# Patient Record
Sex: MALE | Race: WHITE | NOT HISPANIC OR LATINO | Employment: OTHER | URBAN - METROPOLITAN AREA
[De-identification: names, ages, dates, MRNs, and addresses within clinical notes are randomized per-mention and may not be internally consistent; named-entity substitution may affect disease eponyms.]

---

## 2017-01-27 ENCOUNTER — LAB VISIT (OUTPATIENT)
Dept: LAB | Facility: OTHER | Age: 62
End: 2017-01-27
Attending: INTERNAL MEDICINE
Payer: COMMERCIAL

## 2017-01-27 ENCOUNTER — OFFICE VISIT (OUTPATIENT)
Dept: INTERNAL MEDICINE | Facility: CLINIC | Age: 62
End: 2017-01-27
Payer: COMMERCIAL

## 2017-01-27 VITALS
SYSTOLIC BLOOD PRESSURE: 114 MMHG | HEART RATE: 50 BPM | DIASTOLIC BLOOD PRESSURE: 70 MMHG | HEIGHT: 70 IN | OXYGEN SATURATION: 97 % | BODY MASS INDEX: 22.47 KG/M2 | WEIGHT: 156.94 LBS

## 2017-01-27 DIAGNOSIS — Z00.00 ANNUAL PHYSICAL EXAM: Primary | ICD-10-CM

## 2017-01-27 DIAGNOSIS — Z00.00 ANNUAL PHYSICAL EXAM: ICD-10-CM

## 2017-01-27 LAB
ALBUMIN SERPL BCP-MCNC: 4 G/DL
ALP SERPL-CCNC: 51 U/L
ALT SERPL W/O P-5'-P-CCNC: 30 U/L
ANION GAP SERPL CALC-SCNC: 5 MMOL/L
AST SERPL-CCNC: 40 U/L
BILIRUB SERPL-MCNC: 0.5 MG/DL
BUN SERPL-MCNC: 22 MG/DL
CALCIUM SERPL-MCNC: 9.8 MG/DL
CHLORIDE SERPL-SCNC: 97 MMOL/L
CHOLEST/HDLC SERPL: 2.3 {RATIO}
CO2 SERPL-SCNC: 31 MMOL/L
CREAT SERPL-MCNC: 1 MG/DL
EST. GFR  (AFRICAN AMERICAN): >60 ML/MIN/1.73 M^2
EST. GFR  (NON AFRICAN AMERICAN): >60 ML/MIN/1.73 M^2
GLUCOSE SERPL-MCNC: 89 MG/DL
HDL/CHOLESTEROL RATIO: 44.1 %
HDLC SERPL-MCNC: 104 MG/DL
HDLC SERPL-MCNC: 236 MG/DL
LDLC SERPL CALC-MCNC: 118 MG/DL
NONHDLC SERPL-MCNC: 132 MG/DL
POTASSIUM SERPL-SCNC: 4.2 MMOL/L
PROT SERPL-MCNC: 6.9 G/DL
SODIUM SERPL-SCNC: 133 MMOL/L
TRIGL SERPL-MCNC: 70 MG/DL

## 2017-01-27 PROCEDURE — 80053 COMPREHEN METABOLIC PANEL: CPT

## 2017-01-27 PROCEDURE — 80061 LIPID PANEL: CPT

## 2017-01-27 PROCEDURE — 99999 PR PBB SHADOW E&M-EST. PATIENT-LVL III: CPT | Mod: PBBFAC,,, | Performed by: INTERNAL MEDICINE

## 2017-01-27 PROCEDURE — 86803 HEPATITIS C AB TEST: CPT

## 2017-01-27 PROCEDURE — 36415 COLL VENOUS BLD VENIPUNCTURE: CPT

## 2017-01-27 PROCEDURE — 99396 PREV VISIT EST AGE 40-64: CPT | Mod: S$GLB,,, | Performed by: INTERNAL MEDICINE

## 2017-01-27 NOTE — PROGRESS NOTES
"Subjective:       Patient ID: Hugh Bates is a 61 y.o. male.    Chief Complaint: Annual Exam    HPI Comments:   Pt here for physical exam.    He is feeling well.  No c/o.      ROS  -Occasional triggering in a finger of his R hand - he is unsure which finger.  This has been happening for approx 1 yr and occurs infrequently.    -Self dx'd plantar fasciitis and he has bene using a 'roller' for this.      He runs a few miles 3x/wk.  He also lifts weights 3x/wk.         Review of Systems   Constitutional: Negative.    HENT: Negative.    Eyes: Negative.    Respiratory: Negative.    Cardiovascular: Negative.    Gastrointestinal: Negative.    Genitourinary: Negative.    Musculoskeletal: Negative.    Skin: Negative.    Neurological: Negative.    Psychiatric/Behavioral: Negative.        Objective:       Vitals:    01/27/17 1103   BP: 114/70   Pulse: (!) 50   SpO2: 97%   Weight: 71.2 kg (156 lb 15.5 oz)   Height: 5' 10" (1.778 m)     Physical Exam   Constitutional: He is oriented to person, place, and time. He appears well-developed and well-nourished. No distress.   HENT:   Head: Normocephalic and atraumatic.   Right Ear: Tympanic membrane, external ear and ear canal normal.   Left Ear: Tympanic membrane, external ear and ear canal normal.   Mouth/Throat: Oropharynx is clear and moist and mucous membranes are normal. No oropharyngeal exudate or posterior oropharyngeal erythema.   Eyes: Conjunctivae and EOM are normal. Pupils are equal, round, and reactive to light.   Neck: Normal range of motion. Neck supple. No thyromegaly present.   Cardiovascular: Normal rate, regular rhythm and normal heart sounds.  Exam reveals no gallop and no friction rub.    No murmur heard.  Pulmonary/Chest: Effort normal and breath sounds normal. No respiratory distress. He has no wheezes. He has no rhonchi. He has no rales.   Lymphadenopathy:     He has no cervical adenopathy.   Neurological: He is alert and oriented to person, place, and " time.   Skin: He is not diaphoretic.       Assessment:       1. Annual physical exam        Plan:           HM - Update labs.

## 2017-01-27 NOTE — MR AVS SNAPSHOT
"    [facility]  [address]  [place] [address]  Phone: [phone]  Fax: [phone]                  Hugh Ramirezyayo   2017 11:00 AM   Office Visit    Description:  Male : 1955   Provider:  Rolando Obrien MD   Department:  [facility]           Reason for Visit     Annual Exam           Diagnoses this Visit        Comments    Annual physical exam    -  Primary            To Do List           Goals (5 Years of Data)     None      Follow-Up and Disposition     Return in about 2 years (around 2019).      [facility]     [facility] -  Assistance  Registered nurses in the [facility] provide clinical advisement, health education, appointment booking, and other advisory services.  Call for this free service at [phone].             Medications           Message regarding Medications     Verify the changes and/or additions to your medication regime listed below are the same as discussed with your clinician today.  If any of these changes or additions are incorrect, please notify your healthcare provider.             Verify that the below list of medications is an accurate representation of the medications you are currently taking.  If none reported, the list may be blank. If incorrect, please contact your healthcare provider. Carry this list with you in case of emergency.           Current Medications     omeprazole (PRILOSEC) 20 MG capsule Take 20 mg by mouth as needed.           Clinical Reference Information           Vital Signs - Last Recorded  Most recent update: 2017 11:06 AM by Tamara Foster MA    BP Pulse Ht Wt SpO2 BMI    114/70 (!) 50 5' 10" (1.778 m) 71.2 kg (156 lb 15.5 oz) 97% 22.52 kg/m2      Blood Pressure          Most Recent Value    BP  114/70      Allergies as of 2017     No Known Allergies      Immunizations Administered on Date of Encounter - 2017     None    "   Orders Placed During Today's Visit     Future Labs/Procedures Expected by Expires    Comprehensive metabolic panel  1/27/2017 3/28/2018    Hepatitis C antibody  1/27/2017 3/28/2018    Lipid panel  1/27/2017 3/28/2018

## 2017-01-30 ENCOUNTER — PATIENT MESSAGE (OUTPATIENT)
Dept: INTERNAL MEDICINE | Facility: CLINIC | Age: 62
End: 2017-01-30

## 2017-01-30 DIAGNOSIS — R76.8 POSITIVE HEPATITIS C ANTIBODY TEST: ICD-10-CM

## 2017-01-30 DIAGNOSIS — E87.1 HYPONATREMIA: Primary | ICD-10-CM

## 2017-01-30 LAB — HCV AB SERPL QL IA: POSITIVE

## 2017-01-30 NOTE — TELEPHONE ENCOUNTER
Results reviewed.  Patient notified of results via [employer] message.      Please arrange for pt to have additional testing.

## 2017-02-02 ENCOUNTER — TELEPHONE (OUTPATIENT)
Dept: INTERNAL MEDICINE | Facility: CLINIC | Age: 62
End: 2017-02-02

## 2017-02-02 ENCOUNTER — LAB VISIT (OUTPATIENT)
Dept: LAB | Facility: OTHER | Age: 62
End: 2017-02-02
Attending: INTERNAL MEDICINE
Payer: COMMERCIAL

## 2017-02-02 DIAGNOSIS — E87.1 HYPONATREMIA: ICD-10-CM

## 2017-02-02 DIAGNOSIS — Z11.59 NEED FOR HEPATITIS C SCREENING TEST: ICD-10-CM

## 2017-02-02 DIAGNOSIS — R76.8 POSITIVE HEPATITIS C ANTIBODY TEST: ICD-10-CM

## 2017-02-02 LAB
ANION GAP SERPL CALC-SCNC: 9 MMOL/L
BUN SERPL-MCNC: 21 MG/DL
CALCIUM SERPL-MCNC: 9.3 MG/DL
CHLORIDE SERPL-SCNC: 99 MMOL/L
CO2 SERPL-SCNC: 25 MMOL/L
CREAT SERPL-MCNC: 1 MG/DL
EST. GFR  (AFRICAN AMERICAN): >60 ML/MIN/1.73 M^2
EST. GFR  (NON AFRICAN AMERICAN): >60 ML/MIN/1.73 M^2
GLUCOSE SERPL-MCNC: 92 MG/DL
OSMOLALITY SERPL: 281 MOSM/KG
POTASSIUM SERPL-SCNC: 4.4 MMOL/L
SODIUM SERPL-SCNC: 133 MMOL/L
TSH SERPL DL<=0.005 MIU/L-ACNC: 1.79 UIU/ML

## 2017-02-02 PROCEDURE — 83930 ASSAY OF BLOOD OSMOLALITY: CPT

## 2017-02-02 PROCEDURE — 80048 BASIC METABOLIC PNL TOTAL CA: CPT

## 2017-02-02 PROCEDURE — 87522 HEPATITIS C REVRS TRNSCRPJ: CPT

## 2017-02-02 PROCEDURE — 86803 HEPATITIS C AB TEST: CPT

## 2017-02-02 PROCEDURE — 84443 ASSAY THYROID STIM HORMONE: CPT

## 2017-02-02 NOTE — TELEPHONE ENCOUNTER
Pt inform of lab results and further testing. Pt agrees and verbally understand and has no further questions at this time. Pt states he will call back to schedule additional testing.

## 2017-02-03 LAB — HCV AB SERPL QL IA: POSITIVE

## 2017-02-06 LAB
HCV LOG: <1.08 LOG (10) IU/ML
HCV RNA QUANT PCR: <12 IU/ML
HCV, QUALITATIVE: NOT DETECTED IU/ML

## 2017-02-08 ENCOUNTER — PATIENT MESSAGE (OUTPATIENT)
Dept: INTERNAL MEDICINE | Facility: CLINIC | Age: 62
End: 2017-02-08

## 2017-02-13 ENCOUNTER — TELEPHONE (OUTPATIENT)
Dept: INTERNAL MEDICINE | Facility: CLINIC | Age: 62
End: 2017-02-13

## 2017-02-13 DIAGNOSIS — E87.1 HYPONATREMIA: Primary | ICD-10-CM

## 2017-02-13 DIAGNOSIS — R76.8 HEPATITIS C ANTIBODY TEST POSITIVE: ICD-10-CM

## 2017-02-16 ENCOUNTER — OFFICE VISIT (OUTPATIENT)
Dept: NEPHROLOGY | Facility: CLINIC | Age: 62
End: 2017-02-16
Payer: COMMERCIAL

## 2017-02-16 ENCOUNTER — HOSPITAL ENCOUNTER (OUTPATIENT)
Dept: RADIOLOGY | Facility: HOSPITAL | Age: 62
Discharge: HOME | End: 2017-02-16
Attending: INTERNAL MEDICINE
Payer: COMMERCIAL

## 2017-02-16 ENCOUNTER — LAB VISIT (OUTPATIENT)
Dept: LAB | Facility: OTHER | Age: 62
End: 2017-02-16
Attending: INTERNAL MEDICINE
Payer: COMMERCIAL

## 2017-02-16 VITALS
BODY MASS INDEX: 22.22 KG/M2 | HEIGHT: 70 IN | HEART RATE: 55 BPM | DIASTOLIC BLOOD PRESSURE: 70 MMHG | WEIGHT: 155.19 LBS | OXYGEN SATURATION: 97 % | SYSTOLIC BLOOD PRESSURE: 100 MMHG

## 2017-02-16 DIAGNOSIS — E87.1 HYPONATREMIA: ICD-10-CM

## 2017-02-16 DIAGNOSIS — E87.1 HYPONATREMIA: Primary | ICD-10-CM

## 2017-02-16 LAB
BASOPHILS NFR BLD: 0.5 %
DIFFERENTIAL METHOD: ABNORMAL
EOSINOPHIL NFR BLD: 1 %
ERYTHROCYTE [DISTWIDTH] IN BLOOD BY AUTOMATED COUNT: 12.9 %
HCT VFR BLD AUTO: 41.8 %
HGB BLD-MCNC: 13.9 G/DL
LYMPHOCYTES NFR BLD: 28.1 %
MCH RBC QN AUTO: 31 PG
MCHC RBC AUTO-ENTMCNC: 33.3 %
MCV RBC AUTO: 93 FL
MONOCYTES NFR BLD: 8.6 %
NEUTROPHILS NFR BLD: 61.8 %
PHOSPHATE SERPL-MCNC: 3.1 MG/DL
PLATELET # BLD AUTO: 208 K/UL
PMV BLD AUTO: 10.3 FL
PTH-INTACT SERPL-MCNC: 19.9 PG/ML
RBC # BLD AUTO: 4.49 M/UL
URATE SERPL-MCNC: 5.5 MG/DL
WBC # BLD AUTO: 3.84 K/UL

## 2017-02-16 PROCEDURE — 84550 ASSAY OF BLOOD/URIC ACID: CPT

## 2017-02-16 PROCEDURE — 71020 XR CHEST PA AND LATERAL: CPT | Mod: 26,,, | Performed by: RADIOLOGY

## 2017-02-16 PROCEDURE — 84100 ASSAY OF PHOSPHORUS: CPT

## 2017-02-16 PROCEDURE — 83970 ASSAY OF PARATHORMONE: CPT

## 2017-02-16 PROCEDURE — 83036 HEMOGLOBIN GLYCOSYLATED A1C: CPT

## 2017-02-16 PROCEDURE — 99999 PR PBB SHADOW E&M-EST. PATIENT-LVL III: CPT | Mod: PBBFAC,,, | Performed by: INTERNAL MEDICINE

## 2017-02-16 PROCEDURE — 36415 COLL VENOUS BLD VENIPUNCTURE: CPT

## 2017-02-16 PROCEDURE — 85025 COMPLETE CBC W/AUTO DIFF WBC: CPT

## 2017-02-16 PROCEDURE — 99244 OFF/OP CNSLTJ NEW/EST MOD 40: CPT | Mod: S$GLB,,, | Performed by: INTERNAL MEDICINE

## 2017-02-16 PROCEDURE — 71020 XR CHEST PA AND LATERAL: CPT | Mod: TC

## 2017-02-16 NOTE — LETTER
February 16, 2017      Rolando Obrien MD  [address]  [facility] [address]           [facility]  [address]  [facility] [address]  Phone: [phone]  Fax: [phone]          Patient: Hugh Baets   MR Number: [id number]   YOB: 1955   Date of Visit: 2/16/2017       Dear Dr. Rolando Obrien:    Thank you for referring Hugh Bates to me for evaluation. Attached you will find relevant portions of my assessment and plan of care.    If you have questions, please do not hesitate to call me. I look forward to following Hugh Bates along with you.    Sincerely,    Josue Hamilton MD    Enclosure  CC:  No Recipients    If you would like to receive this communication electronically, please contact [email or web address] or [phone] to request more information on [email or web address] access.    For providers and/or their staff who would like to refer a patient to [facility], please contact us through our one-stop-shop provider referral line, [facility], at [phone].    If you feel you have received this communication in error or would no longer like to receive these types of communications, please e-mail [email or web address]

## 2017-02-16 NOTE — PROGRESS NOTES
New Consultation Report  Nephrology      Consult Requested By: PCP  Reason for Consult: hyponatremia    History of Present Illness:  Patient is a 61 y.o. male presents with unremarkable PMH for a new consultation for evaluation of new onset hyponatremia. The electrolyte disorder was identified on a routine laboratory testing. The patient states that he is very active. He exercises in a gymnasium 3-4 times a week, including lifting weights. During exercise, he drinks a good amount of water. In addition, he drinks 3-4 glasses of water and coffee in the morning. The patient denies taking NSAIDs or new antibiotics, recreational drugs, recent episode of dehydration, diarrhea, nausea or vomiting, acute illness, hospitalization or exposure to IV radiocontrast or new medication. He does not take an anticonvulsant or an SSRI. Denies LOC, seizure or gait unsteadiness. Denies headaches, cough or SOB.     Past Medical History  GERD  HCV infection    MEDICATIONS  None  OTC: vitamins    Review of patient's allergies indicates:  No Known Allergies     Past Surgical History   Procedure Laterality Date    Colonoscopy N/A 2016     Procedure: COLONOSCOPY;  Surgeon: George Virgen MD;  Location: [address];  Service: Colon and Rectal;  Laterality: N/A;     Family History   Problem Relation Age of Onset    Colon polyps Mother     Cancer Father       at 43   unknown primary    No Known Problems Brother     Diabetes Neg Hx      Social History   Substance Use Topics    Smoking status: Never Smoker    Smokeless tobacco: Not on file    Alcohol use 0.6 oz/week     1 Glasses of wine per week       Review of Systems   Constitutional: Negative.    HENT: Negative.    Eyes: Negative.    Respiratory: Negative.    Cardiovascular: Negative.    Gastrointestinal: Positive for heartburn.   Genitourinary: Negative.    Musculoskeletal: Negative.    Skin: Negative.    Neurological: Negative.    All other systems reviewed and are  negative.      Vitals:    02/16/17 1346   BP: 100/70   Pulse: (!) 55       PHYSICAL EXAMINATION:  General: no distress, well nourished  Skin: color, texture, turgor normal. No rash or lesions  HEENT: H Eyes: reactive pupils, normal conjunctiva. Oral mucosa moist, no ulcers. Throat: no erythema.  Neck: supple, symmetrical, trachea midline, no JVD, no carotid bruit  Lungs: clear to auscultation bilaterally and normal respiratory effort  Cardiovascular: Heart: regular rate and rhythm, S1, S2 normal, no murmur, rub or gallop. Pulses: 2+ and symmetric.  Abdomen: bowel sounds present, no abdominal bruit, soft, non-tender non-distented; no masses, organomegaly or ascites.   Musculoskeletal: no pitting edema in lower extremities, no clubbing or cyanosis  Lymph Nodes: No cervical or supraclavicular adenopathy  Neurologic: AAOx3, normal strength and tone. No focal deficit. No asterixis.       LABORATORY DATA:  Lab Results   Component Value Date    CREATININE 1.0 02/02/2017       Prot/Creat Ratio, Ur   Date Value Ref Range Status   02/16/2017 Unable to calculate 0.00 - 0.20 Final       Lab Results   Component Value Date     (L) 02/02/2017    K 4.4 02/02/2017    CO2 25 02/02/2017       last PTH   Lab Results   Component Value Date    PTH 19.9 02/16/2017    CALCIUM 9.3 02/02/2017    PHOS 3.1 02/16/2017       Lab Results   Component Value Date    HGB 13.9 (L) 02/16/2017        Lab Results   Component Value Date    HGBA1C 5.9 09/07/2012       Lab Results   Component Value Date    LDLCALC 118.0 01/27/2017     Urine Na 23 mEq/L  Urine Osm 251 mOsm/kg    IMPRESSION / RECOMMENDATIONS:     1. Hyponatremia  Unknown etiology. Chronic, mild. Appears euvolemic. Urine parameters suggest incomplete or mild SIADH versus reset osmostat. No debilitating condition to predispose him to reset osmostat. Comprehensive metabolic panel. No CNS or pulmonary diagnosis to suggest SIADH. Will obtain serum Osm to confirm that is a hypoosmolal  hyponatremia. Will also investigate for pseudohyponatremia. For treatment, I asked him to limit water intake to 1 L per day. Will observe after that is implemented. Tests to order:    Lipid panel    OSMOLALITY    X-Ray Chest PA And Lateral    Sodium, urine, random    Osmolality, urine    Uric acid    TSH    Cortisol    Basic metabolic panel     SUMMARY OF PLAN:  1. Limit water intake to 1 L per day or less  2. Obtain: serum Osm, BMP, urine Osm, Shadia, serum uric acid, cortisol, TSH, CXR, lipid panel, total protein.   3. RTC in 8 weeks

## 2017-02-16 NOTE — MR AVS SNAPSHOT
"    [facility]  [address]  [place] [address]  Phone: [phone]  Fax: [phone]                  Hugh Bates   2017 1:20 PM   Office Visit    Description:  Male : 1955   Provider:  Josue Hamilton MD   Department:  [facility]           Diagnoses this Visit        Comments    Hyponatremia    -  Primary            To Do List           Goals (5 Years of Data)     None      Follow-Up and Disposition     Return in about 8 weeks (around 2017).      [facility]     [facility] Nurse Care Line -  Assistance  Registered nurses in the [facility] provide clinical advisement, health education, appointment booking, and other advisory services.  Call for this free service at [phone].             Medications           Message regarding Medications     Verify the changes and/or additions to your medication regime listed below are the same as discussed with your clinician today.  If any of these changes or additions are incorrect, please notify your healthcare provider.        STOP taking these medications     omeprazole (PRILOSEC) 20 MG capsule Take 20 mg by mouth as needed.           Verify that the below list of medications is an accurate representation of the medications you are currently taking.  If none reported, the list may be blank. If incorrect, please contact your healthcare provider. Carry this list with you in case of emergency.           Current Medications            Clinical Reference Information           Your Vitals Were     BP Pulse Height Weight SpO2 BMI    100/70 55 5' 10" (1.778 m) 70.4 kg (155 lb 3.3 oz) 97% 22.27 kg/m2      Blood Pressure          Most Recent Value    BP  100/70      Allergies as of 2017     No Known Allergies      Immunizations Administered on Date of Encounter - 2017     None      Orders Placed During Today's Visit     Future Labs/Procedures Expected by Expires    Comprehensive " metabolic panel  2/16/2017 4/17/2018    Cortisol  2/16/2017 4/17/2018    Lipid panel  2/16/2017 4/17/2018    Osmolality, urine  2/16/2017 4/17/2018    OSMOLALITY  2/16/2017 4/17/2018    Sodium, urine, random  2/16/2017 4/17/2018    TSH  2/16/2017 4/17/2018    Uric acid  2/16/2017 4/17/2018    X-Ray Chest PA And Lateral  2/16/2017 2/16/2018    Basic metabolic panel  4/16/2017 (Approximate) 4/17/2018      Language Assistance Services     ATTENTION: Language assistance services are available, free of charge. Please call [phone].      ATENCIÓN: Si habla español, tiene a ya disposición servicios gratuitos de asistencia lingüística. Llame al [phone].     CHÚ Ý: N?u b?n nói Ti?ng Vi?t, có các d?ch v? h? tr? ngôn ng? mi?n phí dành cho b?n. G?i s? [phone].         [facility] complies with applicable Federal civil rights laws and does not discriminate on the basis of race, color, national origin, age, disability, or sex.

## 2017-02-17 LAB
ESTIMATED AVG GLUCOSE: 114 MG/DL
HBA1C MFR BLD HPLC: 5.6 %

## 2017-02-21 DIAGNOSIS — E87.1 HYPONATREMIA: Primary | ICD-10-CM

## 2017-04-10 ENCOUNTER — LAB VISIT (OUTPATIENT)
Dept: LAB | Facility: OTHER | Age: 62
End: 2017-04-10
Attending: INTERNAL MEDICINE
Payer: COMMERCIAL

## 2017-04-10 DIAGNOSIS — E87.1 HYPONATREMIA: ICD-10-CM

## 2017-04-10 LAB
BILIRUB UR QL STRIP: NEGATIVE
CLARITY UR: CLEAR
COLOR UR: YELLOW
CREAT UR-MCNC: 62.5 MG/DL
GLUCOSE UR QL STRIP: NEGATIVE
HGB UR QL STRIP: NEGATIVE
KETONES UR QL STRIP: NEGATIVE
LEUKOCYTE ESTERASE UR QL STRIP: NEGATIVE
NITRITE UR QL STRIP: NEGATIVE
PH UR STRIP: 7 [PH] (ref 5–8)
PROT UR QL STRIP: NEGATIVE
PROT UR-MCNC: <7 MG/DL
PROT/CREAT RATIO, UR: NORMAL
SP GR UR STRIP: 1.01 (ref 1–1.03)
URN SPEC COLLECT METH UR: NORMAL
UROBILINOGEN UR STRIP-ACNC: NEGATIVE EU/DL

## 2017-04-10 PROCEDURE — 81003 URINALYSIS AUTO W/O SCOPE: CPT

## 2017-04-10 PROCEDURE — 82570 ASSAY OF URINE CREATININE: CPT

## 2017-04-21 ENCOUNTER — OFFICE VISIT (OUTPATIENT)
Dept: NEPHROLOGY | Facility: CLINIC | Age: 62
End: 2017-04-21
Payer: COMMERCIAL

## 2017-04-21 VITALS
OXYGEN SATURATION: 98 % | HEIGHT: 70 IN | BODY MASS INDEX: 21.47 KG/M2 | DIASTOLIC BLOOD PRESSURE: 78 MMHG | HEART RATE: 48 BPM | SYSTOLIC BLOOD PRESSURE: 118 MMHG | WEIGHT: 149.94 LBS

## 2017-04-21 DIAGNOSIS — E87.1 HYPONATREMIA: Primary | ICD-10-CM

## 2017-04-21 PROCEDURE — 1160F RVW MEDS BY RX/DR IN RCRD: CPT | Mod: S$GLB,,, | Performed by: INTERNAL MEDICINE

## 2017-04-21 PROCEDURE — 99213 OFFICE O/P EST LOW 20 MIN: CPT | Mod: S$GLB,,, | Performed by: INTERNAL MEDICINE

## 2017-04-21 PROCEDURE — 99999 PR PBB SHADOW E&M-EST. PATIENT-LVL III: CPT | Mod: PBBFAC,,, | Performed by: INTERNAL MEDICINE

## 2017-04-21 NOTE — PROGRESS NOTES
Return Visit Report  Nephrology      Consult Requested By: PCP  Reason for Consult: hyponatremia    History of Present Illness:  Patient is a 62 y.o. male  returns for follow up evaluation of new onset hyponatremia.     The patient states that he remains very active. He exercises in a gymnasium 3-4 times a week, including lifting weights. He has significantly reduced the amount of water that he drinks daily.  The patient denies taking NSAIDs or new antibiotics, recreational drugs, recent episode of dehydration, diarrhea, nausea or vomiting, acute illness, hospitalization or exposure to IV radiocontrast or new medication. He does not take an anticonvulsant or an SSRI. Denies LOC, seizure or gait unsteadiness. Denies headaches, cough or SOB.     Past Medical History  GERD  HCV infection    MEDICATIONS  None  OTC: vitamins    Review of Systems   Constitutional: Negative.    HENT: Negative.    Eyes: Negative.    Respiratory: Negative.    Cardiovascular: Negative.    Gastrointestinal: Positive for heartburn.   Genitourinary: Negative.    Musculoskeletal: Negative.    Skin: Negative.    Neurological: Negative.    All other systems reviewed and are negative.      BP: 118/62 mmHg    PHYSICAL EXAMINATION:  General: no distress, well nourished  Skin: color, texture, turgor normal. No rash or lesions  HEENT: Eyes: reactive pupils, normal conjunctiva. Oral mucosa moist, no ulcers. Throat: no erythema.  Neck: supple, symmetrical, trachea midline, no JVD, no carotid bruit  Lungs: clear to auscultation bilaterally and normal respiratory effort  Cardiovascular: Heart: regular rate and rhythm, S1, S2 normal, no murmur, rub or gallop. Pulses: 2+ and symmetric.  Abdomen: bowel sounds present, no abdominal bruit, soft, non-tender non-distented; no masses, organomegaly or ascites.   Musculoskeletal: no pitting edema in lower extremities, no clubbing or cyanosis  Lymph Nodes: No cervical or supraclavicular adenopathy  Neurologic: AAOx3,  normal strength and tone. No focal deficit. No asterixis.       LABORATORY DATA:  Lab Results   Component Value Date    CREATININE 1.0 04/10/2017       Prot/Creat Ratio, Ur   Date Value Ref Range Status   04/10/2017 Unable to calculate 0.00 - 0.20 Final   02/16/2017 Unable to calculate 0.00 - 0.20 Final       Lab Results   Component Value Date     04/10/2017    K 4.3 04/10/2017    CO2 29 04/10/2017       last PTH   Lab Results   Component Value Date    PTH 19.9 02/16/2017    CALCIUM 9.3 04/10/2017    PHOS 3.1 02/16/2017       Lab Results   Component Value Date    HGB 13.9 (L) 04/10/2017        Lab Results   Component Value Date    HGBA1C 5.6 02/16/2017       Lab Results   Component Value Date    LDLCALC 105.4 02/16/2017     Urine Na 23 mEq/L  Urine Osm 251 mOsm/kg    IMPRESSION / RECOMMENDATIONS:     1. Hyponatremia  Most likely due to Incomplete or mild SIADH versus reset osmostat. Has improved remarkably, sNa WNL now. I recommend to continue limiting water intake to 1 - 1.5 L per day.      SUMMARY OF PLAN:  1. Limit water intake to 1 - 1.5 L per day   2. RTC PRN

## 2018-11-30 ENCOUNTER — PATIENT MESSAGE (OUTPATIENT)
Dept: INTERNAL MEDICINE | Facility: CLINIC | Age: 63
End: 2018-11-30

## 2018-11-30 DIAGNOSIS — Z00.00 ANNUAL PHYSICAL EXAM: Primary | ICD-10-CM

## 2018-11-30 NOTE — TELEPHONE ENCOUNTER
Spoke to Homa Jana and scheduled a date and time to have lab work.  Patient states no further questions or concerns.

## 2018-12-07 ENCOUNTER — OFFICE VISIT (OUTPATIENT)
Dept: INTERNAL MEDICINE | Facility: CLINIC | Age: 63
End: 2018-12-07
Payer: COMMERCIAL

## 2018-12-07 ENCOUNTER — LAB VISIT (OUTPATIENT)
Dept: LAB | Facility: OTHER | Age: 63
End: 2018-12-07
Attending: NURSE PRACTITIONER
Payer: COMMERCIAL

## 2018-12-07 VITALS
HEART RATE: 58 BPM | BODY MASS INDEX: 22.25 KG/M2 | OXYGEN SATURATION: 98 % | DIASTOLIC BLOOD PRESSURE: 64 MMHG | WEIGHT: 155.44 LBS | SYSTOLIC BLOOD PRESSURE: 118 MMHG | HEIGHT: 70 IN

## 2018-12-07 DIAGNOSIS — R82.998 AMBER-COLORED URINE: Primary | ICD-10-CM

## 2018-12-07 DIAGNOSIS — R30.0 DYSURIA: ICD-10-CM

## 2018-12-07 DIAGNOSIS — N30.00 ACUTE CYSTITIS WITHOUT HEMATURIA: ICD-10-CM

## 2018-12-07 LAB
BACTERIA #/AREA URNS AUTO: ABNORMAL /HPF
BILIRUB UR QL STRIP: NEGATIVE
CLARITY UR REFRACT.AUTO: CLEAR
COLOR UR AUTO: YELLOW
GLUCOSE UR QL STRIP: NEGATIVE
HGB UR QL STRIP: ABNORMAL
KETONES UR QL STRIP: NEGATIVE
LEUKOCYTE ESTERASE UR QL STRIP: ABNORMAL
MICROSCOPIC COMMENT: ABNORMAL
NITRITE UR QL STRIP: NEGATIVE
PH UR STRIP: 7 [PH] (ref 5–8)
PROT UR QL STRIP: NEGATIVE
RBC #/AREA URNS AUTO: 2 /HPF (ref 0–4)
SP GR UR STRIP: 1 (ref 1–1.03)
SQUAMOUS #/AREA URNS AUTO: 0 /HPF
URN SPEC COLLECT METH UR: ABNORMAL
WBC #/AREA URNS AUTO: 87 /HPF (ref 0–5)
WBC CLUMPS UR QL AUTO: ABNORMAL

## 2018-12-07 PROCEDURE — 3008F BODY MASS INDEX DOCD: CPT | Mod: CPTII,S$GLB,, | Performed by: NURSE PRACTITIONER

## 2018-12-07 PROCEDURE — 99213 OFFICE O/P EST LOW 20 MIN: CPT | Mod: S$GLB,,, | Performed by: NURSE PRACTITIONER

## 2018-12-07 PROCEDURE — 81001 URINALYSIS AUTO W/SCOPE: CPT

## 2018-12-07 PROCEDURE — 87088 URINE BACTERIA CULTURE: CPT

## 2018-12-07 PROCEDURE — 99999 PR PBB SHADOW E&M-EST. PATIENT-LVL IV: CPT | Mod: PBBFAC,,, | Performed by: NURSE PRACTITIONER

## 2018-12-07 PROCEDURE — 87186 SC STD MICRODIL/AGAR DIL: CPT

## 2018-12-07 PROCEDURE — 87077 CULTURE AEROBIC IDENTIFY: CPT

## 2018-12-07 PROCEDURE — 87086 URINE CULTURE/COLONY COUNT: CPT

## 2018-12-07 RX ORDER — NITROFURANTOIN (MACROCRYSTALS) 100 MG/1
100 CAPSULE ORAL EVERY 12 HOURS
Qty: 14 CAPSULE | Refills: 0 | Status: SHIPPED | OUTPATIENT
Start: 2018-12-07 | End: 2018-12-14

## 2018-12-07 RX ORDER — PHENAZOPYRIDINE HYDROCHLORIDE 100 MG/1
100 TABLET, FILM COATED ORAL 3 TIMES DAILY PRN
Qty: 6 TABLET | Refills: 0 | Status: SHIPPED | OUTPATIENT
Start: 2018-12-07 | End: 2018-12-07

## 2018-12-07 NOTE — PATIENT INSTRUCTIONS
Urine Dipstick, U/A, Urine culture.  Macrodantin 100 mg po q 12 hours x 7 days.  OTC Tylenol prn.  Report to Emergency Department for severe or worsening symptoms. Understands ED prompts.  F/U with Urology.  F/U with PCP.  RT prn.

## 2018-12-07 NOTE — PROGRESS NOTES
Subjective:       Patient ID: Hugh Bates is a 63 y.o. male.    Chief Complaint: Hematuria      Hematuria   This is a new problem. The current episode started yesterday. The problem is unchanged. He reports no clotting in his urine stream. His pain is at a severity of 4/10. The pain is moderate. He describes his urine color as tea colored. Irritative symptoms include frequency and urgency. Irritative symptoms do not include nocturia. Associated symptoms include dysuria. Pertinent negatives include no abdominal pain, chills, facial swelling, fever, flank pain, genital pain, hesitancy, inability to urinate, nausea, vomiting or sore throat. (Dizziness  achiness ) There is no history of hypertension,  trauma, kidney stones, recent infection, sickle cell disease, STDs, tobacco use or UTI.   Urinary Tract Infection    This is a new problem. The current episode started yesterday. The problem has been unchanged. The quality of the pain is described as burning. The pain is at a severity of 4/10. The pain is mild. There has been no fever. There is no history of pyelonephritis. Associated symptoms include frequency, hematuria and urgency. Pertinent negatives include no behavior changes, chills, discharge, flank pain, hesitancy, nausea, possible pregnancy, sweats, vomiting, weight loss, bubble bath use, constipation, rash or withholding. He has tried nothing for the symptoms. There is no history of catheterization, diabetes insipidus, diabetes mellitus, genitourinary reflux, hypertension, kidney stones, recurrent UTIs, a single kidney, STD, urinary stasis or a urological procedure.        Past Medical History: Patient has no past medical history on file.    Past Surgical History: Patient has a past surgical history that includes COLONOSCOPY (N/A, 9/30/2016).    Social History: Patient reports that  has never smoked. He does not have any smokeless tobacco history on file. He reports that he drinks about 0.6 oz of alcohol  per week. He reports that he does not use drugs.    Family History: family history includes Cancer in his father; Colon polyps in his mother; No Known Problems in his brother.    Medications:   Current Outpatient Medications   Medication Sig    nitrofurantoin (MACRODANTIN) 100 MG capsule Take 1 capsule (100 mg total) by mouth every 12 (twelve) hours. for 7 days     No current facility-administered medications for this visit.        Allergies: Patient has No Known Allergies.    Review of Systems   Constitutional: Negative for activity change, chills, fatigue, fever, unexpected weight change and weight loss.   HENT: Negative for congestion, dental problem, ear discharge, ear pain, facial swelling, hearing loss, mouth sores, nosebleeds, postnasal drip, rhinorrhea, sinus pressure, sneezing, sore throat, tinnitus, trouble swallowing and voice change.    Eyes: Negative for pain and visual disturbance.   Respiratory: Negative for cough, choking, chest tightness, shortness of breath, wheezing and stridor.    Cardiovascular: Negative for chest pain.   Gastrointestinal: Negative for abdominal pain, constipation, nausea and vomiting.   Genitourinary: Positive for dysuria, frequency, hematuria and urgency. Negative for decreased urine volume, difficulty urinating, discharge, enuresis, flank pain, genital sores, hesitancy, nocturia, penile pain, penile swelling, scrotal swelling and testicular pain.   Musculoskeletal: Negative for gait problem, neck pain and neck stiffness.   Skin: Negative for color change, rash and wound.   Allergic/Immunologic: Negative for environmental allergies.   Neurological: Negative for dizziness, seizures, syncope, facial asymmetry, speech difficulty, weakness, light-headedness, numbness and headaches.   Psychiatric/Behavioral: Negative for agitation, confusion and decreased concentration. The patient is not nervous/anxious.        Objective:       /64 (BP Location: Left arm, Patient Position:  "Sitting, BP Method: Medium (Manual))   Pulse (!) 58   Ht 5' 10" (1.778 m)   Wt 70.5 kg (155 lb 6.8 oz)   SpO2 98%   BMI 22.30 kg/m²     Physical Exam   Constitutional: He is oriented to person, place, and time. He appears well-developed and well-nourished.   HENT:   Head: Normocephalic and atraumatic. Not macrocephalic and not microcephalic. Head is without raccoon's eyes, without Man's sign, without abrasion, without contusion, without laceration, without right periorbital erythema and without left periorbital erythema. Hair is normal.   Right Ear: No lacerations. No drainage, swelling or tenderness. No foreign bodies. No mastoid tenderness. Tympanic membrane is not injected, not scarred, not perforated, not erythematous, not retracted and not bulging. Tympanic membrane mobility is normal. No middle ear effusion. No hemotympanum. No decreased hearing is noted.   Left Ear: No lacerations. No drainage, swelling or tenderness. No foreign bodies. No mastoid tenderness. Tympanic membrane is not injected, not scarred, not perforated, not erythematous, not retracted and not bulging. Tympanic membrane mobility is normal.  No middle ear effusion. No hemotympanum. No decreased hearing is noted.   Nose: Nose normal. No mucosal edema, rhinorrhea, nose lacerations, sinus tenderness or nasal deformity.   Mouth/Throat: Uvula is midline.   Eyes: Conjunctivae and lids are normal. No scleral icterus.   Neck: Trachea normal. Neck supple. No spinous process tenderness and no muscular tenderness present. No neck rigidity. No edema, no erythema and normal range of motion present. No thyroid mass and no thyromegaly present.   Cardiovascular: Normal rate, regular rhythm, normal heart sounds and intact distal pulses. Exam reveals no gallop and no friction rub.   No murmur heard.  Pulmonary/Chest: Effort normal and breath sounds normal. No stridor. No respiratory distress. He has no wheezes. He has no rales. He exhibits no " tenderness.   Abdominal: Soft. Bowel sounds are normal. He exhibits no distension, no ascites and no mass. There is no tenderness. There is no rigidity, no rebound, no guarding, no CVA tenderness, no tenderness at McBurney's point and negative Alcantara's sign.   Musculoskeletal: Normal range of motion.   Lymphadenopathy:        Head (right side): No submental, no submandibular, no tonsillar, no preauricular and no posterior auricular adenopathy present.        Head (left side): No submental, no submandibular, no tonsillar, no preauricular, no posterior auricular and no occipital adenopathy present.   Neurological: He is alert and oriented to person, place, and time. No cranial nerve deficit.   Skin: Skin is warm and dry.   Psychiatric: He has a normal mood and affect. His behavior is normal. Judgment and thought content normal.   Vitals reviewed.      Assessment:       1. Babita-colored urine    2. Acute cystitis without hematuria    3. Dysuria        Plan:       Urine Dipstick, U/A, Urine culture.  Macrodantin 100 mg po q 12 hours x 7 days.  OTC Tylenol prn.  Report to Emergency Department for severe or worsening symptoms. Understands ED prompts.  F/U with Urology.  F/U with PCP.  RT prn.

## 2018-12-10 LAB — BACTERIA UR CULT: NORMAL

## 2018-12-13 ENCOUNTER — LAB VISIT (OUTPATIENT)
Dept: LAB | Facility: OTHER | Age: 63
End: 2018-12-13
Attending: INTERNAL MEDICINE
Payer: COMMERCIAL

## 2018-12-13 DIAGNOSIS — Z00.00 ANNUAL PHYSICAL EXAM: ICD-10-CM

## 2018-12-13 LAB
ALBUMIN SERPL BCP-MCNC: 3.6 G/DL
ALP SERPL-CCNC: 141 U/L
ALT SERPL W/O P-5'-P-CCNC: 48 U/L
ANION GAP SERPL CALC-SCNC: 7 MMOL/L
AST SERPL-CCNC: 29 U/L
BASOPHILS # BLD AUTO: 0.03 K/UL
BASOPHILS NFR BLD: 0.9 %
BILIRUB SERPL-MCNC: 0.5 MG/DL
BUN SERPL-MCNC: 19 MG/DL
CALCIUM SERPL-MCNC: 9.6 MG/DL
CHLORIDE SERPL-SCNC: 98 MMOL/L
CHOLEST SERPL-MCNC: 221 MG/DL
CHOLEST/HDLC SERPL: 2.6 {RATIO}
CO2 SERPL-SCNC: 30 MMOL/L
CREAT SERPL-MCNC: 0.9 MG/DL
DIFFERENTIAL METHOD: ABNORMAL
EOSINOPHIL # BLD AUTO: 0.1 K/UL
EOSINOPHIL NFR BLD: 2.8 %
ERYTHROCYTE [DISTWIDTH] IN BLOOD BY AUTOMATED COUNT: 12.8 %
EST. GFR  (AFRICAN AMERICAN): >60 ML/MIN/1.73 M^2
EST. GFR  (NON AFRICAN AMERICAN): >60 ML/MIN/1.73 M^2
ESTIMATED AVG GLUCOSE: 108 MG/DL
GLUCOSE SERPL-MCNC: 85 MG/DL
HBA1C MFR BLD HPLC: 5.4 %
HCT VFR BLD AUTO: 41.6 %
HDLC SERPL-MCNC: 86 MG/DL
HDLC SERPL: 38.9 %
HGB BLD-MCNC: 13.6 G/DL
LDLC SERPL CALC-MCNC: 128.2 MG/DL
LYMPHOCYTES # BLD AUTO: 1.1 K/UL
LYMPHOCYTES NFR BLD: 32.6 %
MCH RBC QN AUTO: 30.6 PG
MCHC RBC AUTO-ENTMCNC: 32.7 G/DL
MCV RBC AUTO: 94 FL
MONOCYTES # BLD AUTO: 0.4 K/UL
MONOCYTES NFR BLD: 12.7 %
NEUTROPHILS # BLD AUTO: 1.6 K/UL
NEUTROPHILS NFR BLD: 51 %
NONHDLC SERPL-MCNC: 135 MG/DL
PLATELET # BLD AUTO: 264 K/UL
PMV BLD AUTO: 10.4 FL
POTASSIUM SERPL-SCNC: 4.6 MMOL/L
PROT SERPL-MCNC: 6.9 G/DL
RBC # BLD AUTO: 4.45 M/UL
SODIUM SERPL-SCNC: 135 MMOL/L
TRIGL SERPL-MCNC: 34 MG/DL
TSH SERPL DL<=0.005 MIU/L-ACNC: 1.28 UIU/ML
WBC # BLD AUTO: 3.22 K/UL

## 2018-12-13 PROCEDURE — 85025 COMPLETE CBC W/AUTO DIFF WBC: CPT

## 2018-12-13 PROCEDURE — 84443 ASSAY THYROID STIM HORMONE: CPT

## 2018-12-13 PROCEDURE — 80053 COMPREHEN METABOLIC PANEL: CPT

## 2018-12-13 PROCEDURE — 83036 HEMOGLOBIN GLYCOSYLATED A1C: CPT

## 2018-12-13 PROCEDURE — 80061 LIPID PANEL: CPT

## 2018-12-13 PROCEDURE — 36415 COLL VENOUS BLD VENIPUNCTURE: CPT

## 2018-12-18 ENCOUNTER — LAB VISIT (OUTPATIENT)
Dept: LAB | Facility: OTHER | Age: 63
End: 2018-12-18
Attending: INTERNAL MEDICINE
Payer: COMMERCIAL

## 2018-12-18 ENCOUNTER — OFFICE VISIT (OUTPATIENT)
Dept: INTERNAL MEDICINE | Facility: CLINIC | Age: 63
End: 2018-12-18
Attending: INTERNAL MEDICINE
Payer: COMMERCIAL

## 2018-12-18 VITALS
HEART RATE: 50 BPM | HEIGHT: 70 IN | BODY MASS INDEX: 22.03 KG/M2 | SYSTOLIC BLOOD PRESSURE: 108 MMHG | DIASTOLIC BLOOD PRESSURE: 80 MMHG | WEIGHT: 153.88 LBS | OXYGEN SATURATION: 97 %

## 2018-12-18 DIAGNOSIS — R74.8 ELEVATED ALKALINE PHOSPHATASE LEVEL: ICD-10-CM

## 2018-12-18 DIAGNOSIS — N30.00 ACUTE CYSTITIS WITHOUT HEMATURIA: ICD-10-CM

## 2018-12-18 DIAGNOSIS — K21.9 GASTROESOPHAGEAL REFLUX DISEASE WITHOUT ESOPHAGITIS: ICD-10-CM

## 2018-12-18 DIAGNOSIS — Z00.00 ANNUAL PHYSICAL EXAM: Primary | ICD-10-CM

## 2018-12-18 LAB
25(OH)D3+25(OH)D2 SERPL-MCNC: 29 NG/ML
ALBUMIN SERPL BCP-MCNC: 3.8 G/DL
ALP SERPL-CCNC: 95 U/L
ALT SERPL W/O P-5'-P-CCNC: 30 U/L
ANION GAP SERPL CALC-SCNC: 8 MMOL/L
AST SERPL-CCNC: 28 U/L
BILIRUB SERPL-MCNC: 0.3 MG/DL
BUN SERPL-MCNC: 21 MG/DL
CALCIUM SERPL-MCNC: 9.8 MG/DL
CHLORIDE SERPL-SCNC: 99 MMOL/L
CO2 SERPL-SCNC: 28 MMOL/L
COMPLEXED PSA SERPL-MCNC: 6.5 NG/ML
CREAT SERPL-MCNC: 1.1 MG/DL
EST. GFR  (AFRICAN AMERICAN): >60 ML/MIN/1.73 M^2
EST. GFR  (NON AFRICAN AMERICAN): >60 ML/MIN/1.73 M^2
GGT SERPL-CCNC: 140 U/L
GLUCOSE SERPL-MCNC: 83 MG/DL
POTASSIUM SERPL-SCNC: 4.4 MMOL/L
PROT SERPL-MCNC: 7 G/DL
SODIUM SERPL-SCNC: 135 MMOL/L

## 2018-12-18 PROCEDURE — 99396 PR PREVENTIVE VISIT,EST,40-64: ICD-10-PCS | Mod: S$GLB,,, | Performed by: INTERNAL MEDICINE

## 2018-12-18 PROCEDURE — 82306 VITAMIN D 25 HYDROXY: CPT

## 2018-12-18 PROCEDURE — 99999 PR PBB SHADOW E&M-EST. PATIENT-LVL III: CPT | Mod: PBBFAC,,, | Performed by: INTERNAL MEDICINE

## 2018-12-18 PROCEDURE — 86703 HIV-1/HIV-2 1 RESULT ANTBDY: CPT

## 2018-12-18 PROCEDURE — 99396 PREV VISIT EST AGE 40-64: CPT | Mod: S$GLB,,, | Performed by: INTERNAL MEDICINE

## 2018-12-18 PROCEDURE — 80053 COMPREHEN METABOLIC PANEL: CPT

## 2018-12-18 PROCEDURE — 36415 COLL VENOUS BLD VENIPUNCTURE: CPT

## 2018-12-18 PROCEDURE — 87340 HEPATITIS B SURFACE AG IA: CPT

## 2018-12-18 PROCEDURE — 99999 PR PBB SHADOW E&M-EST. PATIENT-LVL III: ICD-10-PCS | Mod: PBBFAC,,, | Performed by: INTERNAL MEDICINE

## 2018-12-18 PROCEDURE — 84153 ASSAY OF PSA TOTAL: CPT

## 2018-12-18 PROCEDURE — 82977 ASSAY OF GGT: CPT

## 2018-12-18 NOTE — PROGRESS NOTES
Subjective:       Patient ID: Hugh Bates is a 63 y.o. male.    Chief Complaint: Annual Exam    Here to establish care    Recent UTI successfully tx with macrodantin. Symptoms improved.    Plantar fasciitis   Left leg pain when driving    Sometimes told his iron is low when he donates blood. He does this quite often every chance he can.    Hx of shingles on right chest wall. No acute issues.    Labs reviewed. Mild increase in alk phos and LFTs. He denies night sweats, weight loss, bones pains, abd pain, yellowing of skin, excessive EtOH intake, daily tylenol intake, recent neew meds aside from Abx as above.               Review of Systems   Constitutional: Negative for activity change and unexpected weight change.   HENT: Positive for rhinorrhea. Negative for hearing loss and trouble swallowing.    Eyes: Negative for discharge and visual disturbance.   Respiratory: Negative for chest tightness and wheezing.    Cardiovascular: Negative for chest pain and palpitations.   Gastrointestinal: Negative for blood in stool, constipation, diarrhea and vomiting.   Endocrine: Negative for polydipsia and polyuria.   Genitourinary: Positive for urgency. Negative for difficulty urinating and hematuria.   Musculoskeletal: Negative for arthralgias, joint swelling and neck pain.   Neurological: Negative for weakness and headaches.   Psychiatric/Behavioral: Negative for confusion and dysphoric mood.       No past medical history on file.  Past Surgical History:   Procedure Laterality Date    COLONOSCOPY N/A 2016    Performed by George Virgen MD at [facility] (4TH FLR)     Family History   Problem Relation Age of Onset    Colon polyps Mother     Cancer Father          at 43   unknown primary    No Known Problems Brother     Diabetes Neg Hx      Social History     Socioeconomic History    Marital status:      Spouse name: Not on file    Number of children: Not on file    Years of education: Not on file     "Highest education level: Not on file   Social Needs    Financial resource strain: Not on file    Food insecurity - worry: Not on file    Food insecurity - inability: Not on file    Transportation needs - medical: Not on file    Transportation needs - non-medical: Not on file   Occupational History    Occupation: Collections   Tobacco Use    Smoking status: Never Smoker   Substance and Sexual Activity    Alcohol use: Yes     Alcohol/week: 0.6 oz     Types: 1 Glasses of wine per week    Drug use: No    Sexual activity: Not on file   Other Topics Concern    Not on file   Social History Narrative    Lives w/wife.  3 adult children.           Objective:      Vitals:    12/18/18 1557   BP: 108/80   BP Location: Right arm   Patient Position: Sitting   BP Method: Small (Manual)   Pulse: (!) 50   SpO2: 97%   Weight: 69.8 kg (153 lb 14.1 oz)   Height: 5' 10" (1.778 m)      Physical Exam   Constitutional: He is oriented to person, place, and time. He appears well-developed and well-nourished. No distress.   HENT:   Head: Normocephalic and atraumatic.   Mouth/Throat: Oropharynx is clear and moist. No oropharyngeal exudate.   Eyes: Conjunctivae and EOM are normal. Pupils are equal, round, and reactive to light. No scleral icterus.   Neck: No thyromegaly present.   Cardiovascular: Normal rate, regular rhythm and normal heart sounds.   No murmur heard.  Pulmonary/Chest: Effort normal and breath sounds normal. He has no wheezes. He has no rales.   Abdominal: Soft. He exhibits no distension. There is no tenderness.   Musculoskeletal: He exhibits no edema or tenderness.   Lymphadenopathy:     He has no cervical adenopathy.   Neurological: He is alert and oriented to person, place, and time.   Skin: Skin is warm and dry.   Psychiatric: He has a normal mood and affect. His behavior is normal.       Assessment:       1. Annual physical exam    2. Gastroesophageal reflux disease without esophagitis    3. Elevated alkaline " phosphatase level    4. Acute cystitis without hematuria        Plan:       Hugh was seen today for annual exam.    Diagnoses and all orders for this visit:    Annual physical exam  Labs and HM and cancer screening discussed.    Gastroesophageal reflux disease without esophagitis    Elevated alkaline phosphatase level  -     Comprehensive metabolic panel; Future  -     HIV 1/2 Ag/Ab (4th Gen); Future  -     Hepatitis B surface antigen; Future  -     Vitamin D; Future  -     Gamma GT; Future  -     US Abdomen Limited; Future  -     PSA, Screening; Future    Acute cystitis without hematuria  resolved    RTC in 2 months or sooner prn      Side effects of medication(s) were discussed in detail and patient voiced understanding.  Patient will call back for any issues or complications.

## 2018-12-19 ENCOUNTER — TELEPHONE (OUTPATIENT)
Dept: INTERNAL MEDICINE | Facility: CLINIC | Age: 63
End: 2018-12-19

## 2018-12-19 DIAGNOSIS — R97.20 ELEVATED PSA: Primary | ICD-10-CM

## 2018-12-19 LAB
HBV SURFACE AG SERPL QL IA: NEGATIVE
HIV 1+2 AB+HIV1 P24 AG SERPL QL IA: NEGATIVE

## 2018-12-19 NOTE — TELEPHONE ENCOUNTER
Spoke to pt about recent labs.  Please re-schedule appt with Dr Hassan, but now for a different reason

## 2019-01-17 ENCOUNTER — OFFICE VISIT (OUTPATIENT)
Dept: UROLOGY | Facility: CLINIC | Age: 64
End: 2019-01-17
Attending: INTERNAL MEDICINE
Payer: COMMERCIAL

## 2019-01-17 VITALS
SYSTOLIC BLOOD PRESSURE: 114 MMHG | WEIGHT: 153 LBS | DIASTOLIC BLOOD PRESSURE: 76 MMHG | BODY MASS INDEX: 21.9 KG/M2 | HEIGHT: 70 IN | HEART RATE: 51 BPM

## 2019-01-17 DIAGNOSIS — R97.20 ELEVATED PSA: Primary | ICD-10-CM

## 2019-01-17 DIAGNOSIS — N39.0 URINARY TRACT INFECTION WITHOUT HEMATURIA, SITE UNSPECIFIED: ICD-10-CM

## 2019-01-17 LAB
BILIRUB SERPL-MCNC: NORMAL MG/DL
BLOOD URINE, POC: NORMAL
COLOR, POC UA: NORMAL
GLUCOSE UR QL STRIP: NORMAL
KETONES UR QL STRIP: NORMAL
LEUKOCYTE ESTERASE URINE, POC: NORMAL
NITRITE, POC UA: NORMAL
PH, POC UA: 7
PROTEIN, POC: NORMAL
SPECIFIC GRAVITY, POC UA: 1
UROBILINOGEN, POC UA: NORMAL

## 2019-01-17 PROCEDURE — 81002 URINALYSIS NONAUTO W/O SCOPE: CPT | Mod: S$GLB,,, | Performed by: UROLOGY

## 2019-01-17 PROCEDURE — 3008F BODY MASS INDEX DOCD: CPT | Mod: CPTII,S$GLB,, | Performed by: UROLOGY

## 2019-01-17 PROCEDURE — 99204 OFFICE O/P NEW MOD 45 MIN: CPT | Mod: 25,S$GLB,, | Performed by: UROLOGY

## 2019-01-17 PROCEDURE — 3008F PR BODY MASS INDEX (BMI) DOCUMENTED: ICD-10-PCS | Mod: CPTII,S$GLB,, | Performed by: UROLOGY

## 2019-01-17 PROCEDURE — 81002 POCT URINE DIPSTICK WITHOUT MICROSCOPE: ICD-10-PCS | Mod: S$GLB,,, | Performed by: UROLOGY

## 2019-01-17 PROCEDURE — 99204 PR OFFICE/OUTPT VISIT, NEW, LEVL IV, 45-59 MIN: ICD-10-PCS | Mod: 25,S$GLB,, | Performed by: UROLOGY

## 2019-01-17 RX ORDER — MULTIVITAMIN
1 TABLET ORAL DAILY
COMMUNITY

## 2019-01-17 RX ORDER — OMEGA-3/DHA/EPA/FISH OIL 300-1000MG
CAPSULE,DELAYED RELEASE (ENTERIC COATED) ORAL
COMMUNITY

## 2019-01-17 RX ORDER — AMOXICILLIN 500 MG
CAPSULE ORAL DAILY
COMMUNITY
End: 2019-01-17

## 2019-01-17 RX ORDER — GLUCOSAMINE/CHONDRO SU A 500-400 MG
1 TABLET ORAL 3 TIMES DAILY
COMMUNITY

## 2019-01-17 NOTE — LETTER
January 17, 2019      Joseph Santana MD  [address]  [address]  [facility] [address]           [facility]  [address] [place] [address], [address]  [facility] [address]  Phone: [phone]  Fax: [phone]          Patient: Hugh Bates   MR Number: [id number]   YOB: 1955   Date of Visit: 1/17/2019       Dear Dr. Joseph Santana:    Thank you for referring Hugh Bates to me for evaluation. Attached you will find relevant portions of my assessment and plan of care.    If you have questions, please do not hesitate to call me. I look forward to following Hugh Bates along with you.    Sincerely,    Lance Hassan MD    Enclosure  CC:  No Recipients    If you would like to receive this communication electronically, please contact [email or web address] or [phone] to request more information on [employer] Link access.    For providers and/or their staff who would like to refer a patient to [facility], please contact us through our one-stop-shop provider referral line, [facility], at [phone].    If you feel you have received this communication in error or would no longer like to receive these types of communications, please e-mail [email or web address]

## 2019-03-12 ENCOUNTER — HOSPITAL ENCOUNTER (OUTPATIENT)
Dept: RADIOLOGY | Facility: OTHER | Age: 64
Discharge: HOME | End: 2019-03-12
Attending: INTERNAL MEDICINE
Payer: COMMERCIAL

## 2019-03-12 ENCOUNTER — HOSPITAL ENCOUNTER (OUTPATIENT)
Dept: RADIOLOGY | Facility: OTHER | Age: 64
Discharge: HOME | End: 2019-03-12
Attending: UROLOGY
Payer: COMMERCIAL

## 2019-03-12 DIAGNOSIS — R74.8 ELEVATED ALKALINE PHOSPHATASE LEVEL: ICD-10-CM

## 2019-03-12 DIAGNOSIS — N39.0 URINARY TRACT INFECTION WITHOUT HEMATURIA, SITE UNSPECIFIED: ICD-10-CM

## 2019-03-12 PROCEDURE — 76770 US EXAM ABDO BACK WALL COMP: CPT | Mod: 26,,, | Performed by: RADIOLOGY

## 2019-03-12 PROCEDURE — 76770 US KIDNEY: ICD-10-PCS | Mod: 26,,, | Performed by: RADIOLOGY

## 2019-03-12 PROCEDURE — 76770 US EXAM ABDO BACK WALL COMP: CPT | Mod: TC

## 2019-03-12 PROCEDURE — 76705 US ABDOMEN LIMITED: ICD-10-PCS | Mod: 26,,, | Performed by: RADIOLOGY

## 2019-03-12 PROCEDURE — 76705 ECHO EXAM OF ABDOMEN: CPT | Mod: TC

## 2019-03-12 PROCEDURE — 76705 ECHO EXAM OF ABDOMEN: CPT | Mod: 26,,, | Performed by: RADIOLOGY

## 2019-03-12 NOTE — PROGRESS NOTES
Dr Hassan forwarded these results to the patient via [employer].  He will discuss the results with the patient in person at the next appointment.  The patient was instructed to make an appointment if he does not already have one scheduled.

## 2019-03-14 ENCOUNTER — PATIENT MESSAGE (OUTPATIENT)
Dept: UROLOGY | Facility: CLINIC | Age: 64
End: 2019-03-14

## 2019-03-14 ENCOUNTER — PATIENT MESSAGE (OUTPATIENT)
Dept: INTERNAL MEDICINE | Facility: CLINIC | Age: 64
End: 2019-03-14

## 2019-03-18 ENCOUNTER — TELEPHONE (OUTPATIENT)
Dept: UROLOGY | Facility: CLINIC | Age: 64
End: 2019-03-18

## 2019-03-18 DIAGNOSIS — R97.20 ELEVATED PSA: Primary | ICD-10-CM

## 2019-03-18 NOTE — TELEPHONE ENCOUNTER
----- Message from Lance Hassan MD sent at 3/18/2019  2:16 PM CDT -----  No need to see me now    See me in 1 yr with psa  I placed the order    Please schedule    Thanks    Sc

## 2019-03-18 NOTE — TELEPHONE ENCOUNTER
Spoke with patient.  Informed him that Dr Hassan reviewed his PSA and it is normal.  No need to 3/26/19 appointment.  Patient notified I will cancel that appointment and put him on a 1 yr recall list.

## 2019-04-17 ENCOUNTER — TELEPHONE (OUTPATIENT)
Dept: DERMATOLOGY | Facility: CLINIC | Age: 64
End: 2019-04-17

## 2019-04-17 NOTE — TELEPHONE ENCOUNTER
L/M on pt.'s cell. He's to call back to schedule an appointment.  ----- Message from Anita Colon sent at 4/17/2019  3:01 PM CDT -----  Contact: pt  Name of Who is Calling: Hugh      What is the request in detail: requesting a call  to be seen sooner than august. That was the first available appt.Please call and advise      Can the clinic reply by [facility]:yes       What Number to [facility] Back [facility] not [facility]: [phone]

## 2019-04-18 ENCOUNTER — TELEPHONE (OUTPATIENT)
Dept: DERMATOLOGY | Facility: CLINIC | Age: 64
End: 2019-04-18

## 2019-04-18 NOTE — TELEPHONE ENCOUNTER
----- Message from Alia Jamison sent at 4/18/2019  8:58 AM CDT -----  Type:  Patient Returning Call    Who Called: CARLOTTA FARMER [[id number]]      Who Left Message for Patient: Nidhi    Does the patient know what this is regarding?:Sooner appt  Best Call Back [phone]

## 2019-05-08 ENCOUNTER — PATIENT MESSAGE (OUTPATIENT)
Dept: INTERNAL MEDICINE | Facility: CLINIC | Age: 64
End: 2019-05-08

## 2019-05-16 ENCOUNTER — OFFICE VISIT (OUTPATIENT)
Dept: DERMATOLOGY | Facility: CLINIC | Age: 64
End: 2019-05-16
Payer: COMMERCIAL

## 2019-05-16 DIAGNOSIS — L81.4 LENTIGO: ICD-10-CM

## 2019-05-16 DIAGNOSIS — D22.5 MELANOCYTIC NEVUS OF TRUNK: ICD-10-CM

## 2019-05-16 DIAGNOSIS — D22.61 MELANOCYTIC NEVUS OF RIGHT UPPER EXTREMITY: ICD-10-CM

## 2019-05-16 DIAGNOSIS — L57.0 ACTINIC KERATOSIS: Primary | ICD-10-CM

## 2019-05-16 DIAGNOSIS — L82.1 SEBORRHEIC KERATOSIS: ICD-10-CM

## 2019-05-16 PROCEDURE — 99203 PR OFFICE/OUTPT VISIT, NEW, LEVL III, 30-44 MIN: ICD-10-PCS | Mod: 25,S$GLB,, | Performed by: DERMATOLOGY

## 2019-05-16 PROCEDURE — 99203 OFFICE O/P NEW LOW 30 MIN: CPT | Mod: 25,S$GLB,, | Performed by: DERMATOLOGY

## 2019-05-16 PROCEDURE — 17000 DESTRUCT PREMALG LESION: CPT | Mod: S$GLB,,, | Performed by: DERMATOLOGY

## 2019-05-16 PROCEDURE — 17003 DESTRUCTION, PREMALIGNANT LESIONS; SECOND THROUGH 14 LESIONS: ICD-10-PCS | Mod: S$GLB,,, | Performed by: DERMATOLOGY

## 2019-05-16 PROCEDURE — 17000 PR DESTRUCTION(LASER SURGERY,CRYOSURGERY,CHEMOSURGERY),PREMALIGNANT LESIONS,FIRST LESION: ICD-10-PCS | Mod: S$GLB,,, | Performed by: DERMATOLOGY

## 2019-05-16 PROCEDURE — 17003 DESTRUCT PREMALG LES 2-14: CPT | Mod: S$GLB,,, | Performed by: DERMATOLOGY

## 2019-05-16 NOTE — PROGRESS NOTES
Subjective:       Patient ID:  Hugh Bates is a 64 y.o. male who presents for   Chief Complaint   Patient presents with    Skin Check     FBSE     Pt. here today for FBSE. Hx of AK's only  Spot  - Initial  Affected locations: diffuse  Duration: 50 years  Signs / symptoms: asymptomatic (some are darker than others)  Severity: mild  Timing: constant  Aggravated by: nothing  Treatments tried: wears SPF.        Review of Systems   Musculoskeletal: Negative for joint swelling and arthralgias.   Skin: Negative for itching, rash and recent sunburn.   Hematologic/Lymphatic: Does not bruise/bleed easily.        Objective:    Physical Exam   Constitutional: He appears well-developed and well-nourished. No distress.   HENT:   Mouth/Throat: Lips normal.    Eyes: Lids are normal.  No conjunctival no injection.   Neurological: He is alert and oriented to person, place, and time. He is not disoriented.   Psychiatric: He has a normal mood and affect.   Skin:   Areas Examined (abnormalities noted in diagram):   Scalp / Hair Palpated and Inspected  Head / Face Inspection Performed  Neck Inspection Performed  Chest / Axilla Inspection Performed  Abdomen Inspection Performed  Genitals / Buttocks / Groin Inspection Performed  Back Inspection Performed  RUE Inspected  LUE Inspection Performed  RLE Inspected  LLE Inspection Performed  Nails and Digits Inspection Performed                   Diagram Legend     Erythematous scaling macule/papule c/w actinic keratosis       Vascular papule c/w angioma      Pigmented verrucoid papule/plaque c/w seborrheic keratosis      Yellow umbilicated papule c/w sebaceous hyperplasia      Irregularly shaped tan macule c/w lentigo     1-2 mm smooth white papules consistent with Milia      Movable subcutaneous cyst with punctum c/w epidermal inclusion cyst      Subcutaneous movable cyst c/w pilar cyst      Firm pink to brown papule c/w dermatofibroma      Pedunculated fleshy papule(s) c/w skin tag(s)       Evenly pigmented macule c/w junctional nevus     Mildly variegated pigmented, slightly irregular-bordered macule c/w mildly atypical nevus      Flesh colored to evenly pigmented papule c/w intradermal nevus       Pink pearly papule/plaque c/w basal cell carcinoma      Erythematous hyperkeratotic cursted plaque c/w SCC      Surgical scar with no sign of skin cancer recurrence      Open and closed comedones      Inflammatory papules and pustules      Verrucoid papule consistent consistent with wart     Erythematous eczematous patches and plaques     Dystrophic onycholytic nail with subungual debris c/w onychomycosis     Umbilicated papule    Erythematous-base heme-crusted tan verrucoid plaque consistent with inflamed seborrheic keratosis     Erythematous Silvery Scaling Plaque c/w Psoriasis     See annotation      Assessment / Plan:        Actinic keratosis  Cryosurgery procedure note:  Risk, benefits, and alternatives of cryosurgery are discussed with the patient, including but not limited to the risks of hypopigmentation, hyperpigmentation, scar, infection, recurrence of lesion(s), development of new lesion(s), and need for additional treatment of the lesion(s). Verbal consent obtained from patient. Liquid nitrogen cryosurgery applied to 7 lesion(s) to produce a freeze injury. Counseled patient that blisters may form, and instructed patient on wound care with gentle cleansing and use of Vaseline ointment to keep moist until healed. Handout was provided, and patient was instructed to return to clinic in 1-2 months if lesions do not completely resolve.    Melanocytic nevus of trunk  Melanocytic nevus of right upper extremity  Benign-appearing nevi present on exam today. Reassurance provided. Counseled patient to periodically examine moles and return to clinic if any changes in size, shape, or color are noted or if it becomes symptomatic (bleeding, itching, pain, etc).    Seborrheic keratosis  This is a common  condition representing benign enlargement of oil glands. It typically occurs in adulthood. Reassurance was given to the patient. No treatment required.    Lentigo  These are benign sun spots which should be monitored for changes. Daily sun protection will reduce the number of new lesions.   Patient instructed in importance of daily broad-spectrum sunscreen use with SPF of at least 30. Sun avoidance and topical protection/protective clothing discussed.    Follow up in about 1 year (around 5/16/2020) for TBSE, or sooner if any new problems or changing lesions.   Principal Discharge DX:	Bartholin cyst Principal Discharge DX:	Bartholin cyst  Instructions for follow-up, activity and diet:	FOLLOW UP WITH OBGYN. Principal Discharge DX:	Bartholin cyst  Instructions for follow-up, activity and diet:	FOLLOW UP WITH OBGYN.  Rest, drink plenty of fluids.  Advance activity as tolerated.  Continue all previously prescribed medications as directed. You can use motrin 600mg every 6-8 hours for pain or fever, and/or Tylenol 650 mg every 4 hours for pain/fever. Follow up with your primary care physician in 48-72 hours- bring copies of your results.  Return to the emergency department for chest pain, shortness of breath, dizziness, or worsening, concerning, or persistent symptoms.

## 2019-11-18 ENCOUNTER — PATIENT MESSAGE (OUTPATIENT)
Dept: INTERNAL MEDICINE | Facility: CLINIC | Age: 64
End: 2019-11-18

## 2019-11-18 DIAGNOSIS — Z00.00 ANNUAL PHYSICAL EXAM: Primary | ICD-10-CM

## 2019-11-18 DIAGNOSIS — Z12.5 PROSTATE CANCER SCREENING: ICD-10-CM

## 2019-12-16 ENCOUNTER — PATIENT MESSAGE (OUTPATIENT)
Dept: INTERNAL MEDICINE | Facility: CLINIC | Age: 64
End: 2019-12-16

## 2019-12-19 ENCOUNTER — PATIENT MESSAGE (OUTPATIENT)
Dept: INTERNAL MEDICINE | Facility: CLINIC | Age: 64
End: 2019-12-19

## 2019-12-19 ENCOUNTER — OFFICE VISIT (OUTPATIENT)
Dept: INTERNAL MEDICINE | Facility: CLINIC | Age: 64
End: 2019-12-19
Attending: INTERNAL MEDICINE
Payer: COMMERCIAL

## 2019-12-19 ENCOUNTER — LAB VISIT (OUTPATIENT)
Dept: LAB | Facility: OTHER | Age: 64
End: 2019-12-19
Attending: INTERNAL MEDICINE
Payer: COMMERCIAL

## 2019-12-19 VITALS
DIASTOLIC BLOOD PRESSURE: 72 MMHG | OXYGEN SATURATION: 99 % | WEIGHT: 159.81 LBS | HEIGHT: 70 IN | SYSTOLIC BLOOD PRESSURE: 126 MMHG | BODY MASS INDEX: 22.88 KG/M2

## 2019-12-19 DIAGNOSIS — Z12.5 PROSTATE CANCER SCREENING: ICD-10-CM

## 2019-12-19 DIAGNOSIS — Z53.20 STATIN DECLINED: ICD-10-CM

## 2019-12-19 DIAGNOSIS — Z00.00 ANNUAL PHYSICAL EXAM: Primary | ICD-10-CM

## 2019-12-19 DIAGNOSIS — Z00.00 ANNUAL PHYSICAL EXAM: ICD-10-CM

## 2019-12-19 DIAGNOSIS — Z91.89 AT RISK FOR CORONARY ARTERY DISEASE: ICD-10-CM

## 2019-12-19 DIAGNOSIS — E87.1 HYPONATREMIA: ICD-10-CM

## 2019-12-19 DIAGNOSIS — K21.9 GASTROESOPHAGEAL REFLUX DISEASE WITHOUT ESOPHAGITIS: ICD-10-CM

## 2019-12-19 LAB
ALBUMIN SERPL BCP-MCNC: 3.9 G/DL (ref 3.5–5.2)
ALP SERPL-CCNC: 59 U/L (ref 55–135)
ALT SERPL W/O P-5'-P-CCNC: 37 U/L (ref 10–44)
ANION GAP SERPL CALC-SCNC: 6 MMOL/L (ref 8–16)
AST SERPL-CCNC: 35 U/L (ref 10–40)
BASOPHILS # BLD AUTO: 0.03 K/UL (ref 0–0.2)
BASOPHILS NFR BLD: 1 % (ref 0–1.9)
BILIRUB SERPL-MCNC: 0.8 MG/DL (ref 0.1–1)
BUN SERPL-MCNC: 15 MG/DL (ref 8–23)
CALCIUM SERPL-MCNC: 9.1 MG/DL (ref 8.7–10.5)
CHLORIDE SERPL-SCNC: 101 MMOL/L (ref 95–110)
CHOLEST SERPL-MCNC: 256 MG/DL (ref 120–199)
CHOLEST/HDLC SERPL: 2.6 {RATIO} (ref 2–5)
CO2 SERPL-SCNC: 29 MMOL/L (ref 23–29)
COMPLEXED PSA SERPL-MCNC: 0.85 NG/ML (ref 0–4)
CREAT SERPL-MCNC: 0.9 MG/DL (ref 0.5–1.4)
DIFFERENTIAL METHOD: ABNORMAL
EOSINOPHIL # BLD AUTO: 0.1 K/UL (ref 0–0.5)
EOSINOPHIL NFR BLD: 2.7 % (ref 0–8)
ERYTHROCYTE [DISTWIDTH] IN BLOOD BY AUTOMATED COUNT: 12.1 % (ref 11.5–14.5)
EST. GFR  (AFRICAN AMERICAN): >60 ML/MIN/1.73 M^2
EST. GFR  (NON AFRICAN AMERICAN): >60 ML/MIN/1.73 M^2
ESTIMATED AVG GLUCOSE: 120 MG/DL (ref 68–131)
GLUCOSE SERPL-MCNC: 95 MG/DL (ref 70–110)
HBA1C MFR BLD HPLC: 5.8 % (ref 4–5.6)
HCT VFR BLD AUTO: 44.4 % (ref 40–54)
HDLC SERPL-MCNC: 99 MG/DL (ref 40–75)
HDLC SERPL: 38.7 % (ref 20–50)
HGB BLD-MCNC: 13.9 G/DL (ref 14–18)
IMM GRANULOCYTES # BLD AUTO: 0 K/UL (ref 0–0.04)
IMM GRANULOCYTES NFR BLD AUTO: 0 % (ref 0–0.5)
LDLC SERPL CALC-MCNC: 147 MG/DL (ref 63–159)
LYMPHOCYTES # BLD AUTO: 1 K/UL (ref 1–4.8)
LYMPHOCYTES NFR BLD: 34.6 % (ref 18–48)
MCH RBC QN AUTO: 29.6 PG (ref 27–31)
MCHC RBC AUTO-ENTMCNC: 31.3 G/DL (ref 32–36)
MCV RBC AUTO: 95 FL (ref 82–98)
MONOCYTES # BLD AUTO: 0.4 K/UL (ref 0.3–1)
MONOCYTES NFR BLD: 12.2 % (ref 4–15)
NEUTROPHILS # BLD AUTO: 1.5 K/UL (ref 1.8–7.7)
NEUTROPHILS NFR BLD: 49.5 % (ref 38–73)
NONHDLC SERPL-MCNC: 157 MG/DL
NRBC BLD-RTO: 0 /100 WBC
PLATELET # BLD AUTO: 225 K/UL (ref 150–350)
PMV BLD AUTO: 10.5 FL (ref 9.2–12.9)
POTASSIUM SERPL-SCNC: 4.5 MMOL/L (ref 3.5–5.1)
PROT SERPL-MCNC: 6.7 G/DL (ref 6–8.4)
RBC # BLD AUTO: 4.7 M/UL (ref 4.6–6.2)
SODIUM SERPL-SCNC: 136 MMOL/L (ref 136–145)
TRIGL SERPL-MCNC: 50 MG/DL (ref 30–150)
TSH SERPL DL<=0.005 MIU/L-ACNC: 1.55 UIU/ML (ref 0.4–4)
WBC # BLD AUTO: 2.95 K/UL (ref 3.9–12.7)

## 2019-12-19 PROCEDURE — 99999 PR PBB SHADOW E&M-EST. PATIENT-LVL III: ICD-10-PCS | Mod: PBBFAC,,, | Performed by: INTERNAL MEDICINE

## 2019-12-19 PROCEDURE — 36415 COLL VENOUS BLD VENIPUNCTURE: CPT

## 2019-12-19 PROCEDURE — 84153 ASSAY OF PSA TOTAL: CPT

## 2019-12-19 PROCEDURE — 99999 PR PBB SHADOW E&M-EST. PATIENT-LVL III: CPT | Mod: PBBFAC,,, | Performed by: INTERNAL MEDICINE

## 2019-12-19 PROCEDURE — 85025 COMPLETE CBC W/AUTO DIFF WBC: CPT

## 2019-12-19 PROCEDURE — 80053 COMPREHEN METABOLIC PANEL: CPT

## 2019-12-19 PROCEDURE — 99396 PREV VISIT EST AGE 40-64: CPT | Mod: S$GLB,,, | Performed by: INTERNAL MEDICINE

## 2019-12-19 PROCEDURE — 84443 ASSAY THYROID STIM HORMONE: CPT

## 2019-12-19 PROCEDURE — 99396 PR PREVENTIVE VISIT,EST,40-64: ICD-10-PCS | Mod: S$GLB,,, | Performed by: INTERNAL MEDICINE

## 2019-12-19 PROCEDURE — 83036 HEMOGLOBIN GLYCOSYLATED A1C: CPT

## 2019-12-19 PROCEDURE — 80061 LIPID PANEL: CPT

## 2019-12-19 NOTE — PROGRESS NOTES
"Subjective:       Patient ID: Hugh Bates is a 64 y.o. male.    Chief Complaint: Annual Exam    Here for annual     63 yo M with PMHx of  Renal cyst, hyponatremia,     Low back pain, saw chiropractor, he is continuing a HEP and this has helped. Plays pickle ball regularly. Glucosamine helps.    Not excited about rec of being on statin.   The 10-year ASCVD risk score (Barak CHAMBERLAIN Jr., et al., 2013) is: 8.2%    Values used to calculate the score:      Age: 64 years      Sex: Male      Is Non- : No      Diabetic: No      Tobacco smoker: No      Systolic Blood Pressure: 126 mmHg      Is BP treated: No      HDL Cholesterol: 86 mg/dL      Total Cholesterol: 221 mg/dL        Review of Systems   Constitutional: Negative for appetite change, chills, fever and unexpected weight change.   HENT: Negative for hearing loss, sore throat and trouble swallowing.    Eyes: Negative for visual disturbance.   Respiratory: Negative for cough, chest tightness and shortness of breath.    Cardiovascular: Negative for chest pain and leg swelling.   Gastrointestinal: Negative for abdominal pain, blood in stool, constipation, diarrhea, nausea and vomiting.   Endocrine: Negative for polydipsia and polyuria.   Genitourinary: Negative for decreased urine volume, difficulty urinating, dysuria, frequency and urgency.   Musculoskeletal: Negative for gait problem.   Skin: Negative for rash.   Neurological: Negative for dizziness and numbness.   Psychiatric/Behavioral: The patient is not nervous/anxious.        Objective:      Vitals:    12/19/19 0815   BP: 126/72   SpO2: 99%   Weight: 72.5 kg (159 lb 13.3 oz)   Height: 5' 10" (1.778 m)      Physical Exam   Constitutional: He is oriented to person, place, and time. He appears well-developed and well-nourished. No distress.   HENT:   Head: Normocephalic and atraumatic.   Mouth/Throat: Oropharynx is clear and moist. No oropharyngeal exudate.   Eyes: Pupils are equal, round, and " reactive to light. Conjunctivae and EOM are normal. No scleral icterus.   Neck: No thyromegaly present.   Cardiovascular: Normal rate, regular rhythm and normal heart sounds.   No murmur heard.  Pulmonary/Chest: Effort normal and breath sounds normal. He has no wheezes. He has no rales.   Abdominal: Soft. He exhibits no distension. There is no tenderness.   Musculoskeletal: He exhibits no edema or tenderness.   Lymphadenopathy:     He has no cervical adenopathy.   Neurological: He is alert and oriented to person, place, and time.   Skin: Skin is warm and dry.   Psychiatric: He has a normal mood and affect. His behavior is normal.       Assessment:       1. Annual physical exam    2. Hyponatremia    3. Gastroesophageal reflux disease without esophagitis        Plan:       Hugh was seen today for annual exam.    Diagnoses and all orders for this visit:    Annual physical exam   Labs drawn this morning. Will f/u. Hm reviewed. Vaccination discussed.    Hyponatremia   Update labs    Gastroesophageal reflux disease without esophagitis   Resolved.    Increased ASCVD risk   Will update FLP then likely proceed with coronary calcium score       Joseph Strong MD  Internal Medicine-[facility]        Side effects of medication(s) were discussed in detail and patient voiced understanding.  Patient will call back for any issues or complications.

## 2020-09-10 ENCOUNTER — PATIENT OUTREACH (OUTPATIENT)
Dept: ADMINISTRATIVE | Facility: OTHER | Age: 65
End: 2020-09-10

## 2020-09-14 ENCOUNTER — OFFICE VISIT (OUTPATIENT)
Dept: DERMATOLOGY | Facility: CLINIC | Age: 65
End: 2020-09-14
Payer: COMMERCIAL

## 2020-09-14 VITALS — TEMPERATURE: 98 F

## 2020-09-14 DIAGNOSIS — D48.5 NEOPLASM OF UNCERTAIN BEHAVIOR OF SKIN: Primary | ICD-10-CM

## 2020-09-14 DIAGNOSIS — D18.01 ANGIOMA OF SKIN: ICD-10-CM

## 2020-09-14 DIAGNOSIS — L57.0 ACTINIC KERATOSIS: ICD-10-CM

## 2020-09-14 DIAGNOSIS — L82.1 SEBORRHEIC KERATOSES: ICD-10-CM

## 2020-09-14 DIAGNOSIS — D22.5 MELANOCYTIC NEVUS OF TRUNK: ICD-10-CM

## 2020-09-14 DIAGNOSIS — L81.4 LENTIGINES: ICD-10-CM

## 2020-09-14 PROCEDURE — 11103 TANGNTL BX SKIN EA SEP/ADDL: CPT | Mod: S$GLB,,, | Performed by: DERMATOLOGY

## 2020-09-14 PROCEDURE — 17000 DESTRUCT PREMALG LESION: CPT | Mod: 59,S$GLB,, | Performed by: DERMATOLOGY

## 2020-09-14 PROCEDURE — 11102 TANGNTL BX SKIN SINGLE LES: CPT | Mod: S$GLB,,, | Performed by: DERMATOLOGY

## 2020-09-14 PROCEDURE — 99213 OFFICE O/P EST LOW 20 MIN: CPT | Mod: 25,S$GLB,, | Performed by: DERMATOLOGY

## 2020-09-14 PROCEDURE — 11103 PR TANGENTIAL BIOPSY, SKIN, EA ADDTL LESION: ICD-10-PCS | Mod: S$GLB,,, | Performed by: DERMATOLOGY

## 2020-09-14 PROCEDURE — 17000 PR DESTRUCTION(LASER SURGERY,CRYOSURGERY,CHEMOSURGERY),PREMALIGNANT LESIONS,FIRST LESION: ICD-10-PCS | Mod: 59,S$GLB,, | Performed by: DERMATOLOGY

## 2020-09-14 PROCEDURE — 17003 DESTRUCTION, PREMALIGNANT LESIONS; SECOND THROUGH 14 LESIONS: ICD-10-PCS | Mod: S$GLB,,, | Performed by: DERMATOLOGY

## 2020-09-14 PROCEDURE — 88305 TISSUE EXAM BY PATHOLOGIST: CPT | Mod: 26,,, | Performed by: PATHOLOGY

## 2020-09-14 PROCEDURE — 88305 TISSUE EXAM BY PATHOLOGIST: CPT | Mod: 59 | Performed by: PATHOLOGY

## 2020-09-14 PROCEDURE — 11102 PR TANGENTIAL BIOPSY, SKIN, SINGLE LESION: ICD-10-PCS | Mod: S$GLB,,, | Performed by: DERMATOLOGY

## 2020-09-14 PROCEDURE — 99213 PR OFFICE/OUTPT VISIT, EST, LEVL III, 20-29 MIN: ICD-10-PCS | Mod: 25,S$GLB,, | Performed by: DERMATOLOGY

## 2020-09-14 PROCEDURE — 88305 TISSUE EXAM BY PATHOLOGIST: ICD-10-PCS | Mod: 26,,, | Performed by: PATHOLOGY

## 2020-09-14 PROCEDURE — 17003 DESTRUCT PREMALG LES 2-14: CPT | Mod: S$GLB,,, | Performed by: DERMATOLOGY

## 2020-09-14 RX ORDER — COLLAGEN, HYDROLYSATE (BOVINE) 100 %
POWDER (GRAM) MISCELLANEOUS
COMMUNITY
Start: 2020-02-01

## 2020-09-14 NOTE — PROGRESS NOTES
Subjective:       Patient ID:  Hugh Bates is a 65 y.o. male who presents for   Chief Complaint   Patient presents with    Mole     diffuse    Lesion     neck     Mole - Initial  Affected locations: diffuse  Signs / symptoms: asymptomatic  Severity: mild  Timing: constant  Aggravated by: nothing  Relieving factors/Treatments tried: nothing    Lesion - Initial  Affected locations: neck  Duration: months.  Signs and Symptoms: feels funny.  Severity: mild  Timing: constant  Aggravated by: nothing      Review of Systems   Constitutional: Negative for fever.   Respiratory: Negative for cough and shortness of breath.    Musculoskeletal: Negative for joint swelling and arthralgias.   Skin: Positive for dry skin. Negative for recent sunburn.   Hematologic/Lymphatic: Does not bruise/bleed easily.        Objective:    Physical Exam   Constitutional: He appears well-developed and well-nourished. No distress.   HENT:   Mouth/Throat: Lips normal.    Eyes: Lids are normal.  No conjunctival no injection.   Neurological: He is alert and oriented to person, place, and time. He is not disoriented.   Psychiatric: He has a normal mood and affect.   Skin:   Areas Examined (abnormalities noted in diagram):   Scalp / Hair Palpated and Inspected  Head / Face Inspection Performed  Neck Inspection Performed  Chest / Axilla Inspection Performed  Abdomen Inspection Performed  Genitals / Buttocks / Groin Inspection Performed  Back Inspection Performed  RUE Inspected  LUE Inspection Performed  RLE Inspected  LLE Inspection Performed  Nails and Digits Inspection Performed                   Diagram Legend     Erythematous scaling macule/papule c/w actinic keratosis       Vascular papule c/w angioma      Pigmented verrucoid papule/plaque c/w seborrheic keratosis      Yellow umbilicated papule c/w sebaceous hyperplasia      Irregularly shaped tan macule c/w lentigo     1-2 mm smooth white papules consistent with Milia      Movable  subcutaneous cyst with punctum c/w epidermal inclusion cyst      Subcutaneous movable cyst c/w pilar cyst      Firm pink to brown papule c/w dermatofibroma      Pedunculated fleshy papule(s) c/w skin tag(s)      Evenly pigmented macule c/w junctional nevus     Mildly variegated pigmented, slightly irregular-bordered macule c/w mildly atypical nevus      Flesh colored to evenly pigmented papule c/w intradermal nevus       Pink pearly papule/plaque c/w basal cell carcinoma      Erythematous hyperkeratotic cursted plaque c/w SCC      Surgical scar with no sign of skin cancer recurrence      Open and closed comedones      Inflammatory papules and pustules      Verrucoid papule consistent consistent with wart     Erythematous eczematous patches and plaques     Dystrophic onycholytic nail with subungual debris c/w onychomycosis     Umbilicated papule    Erythematous-base heme-crusted tan verrucoid plaque consistent with inflamed seborrheic keratosis     Erythematous Silvery Scaling Plaque c/w Psoriasis     See annotation      Assessment / Plan:      Pathology Orders:     Normal Orders This Visit    Specimen to Pathology, Dermatology     Questions:    Procedure Type: Dermatology and skin neoplasms    Number of Specimens: 3    ------------------------: -------------------------    Spec 1 Procedure: Biopsy    Spec 1 Clinical Impression: r/o BLK vs BCC vs melanoma    Spec 1 Source: right lateral neck    ------------------------: -------------------------    Spec 2 Procedure: Biopsy    Spec 2 Clinical Impression: r/o BCC    Spec 2 Source: right anterior shoulder    ------------------------: -------------------------    Spec 3 Procedure: Biopsy    Spec 3 Clinical Impression: r/o BCC    Spec 3 Source: right posterior shoulder        Neoplasm of uncertain behavior of skin  -     Specimen to Pathology, Dermatology  Shave biopsy procedure note x 3:  Risk, benefits, and alternatives of biopsy are discussed with the patient,  including risk of infection, scar, recurrence, and need for additional treatment of site. The patient agrees to the procedure by verbal consent. The area is marked and prepped with alcohol.  Approximately 1 mL of lidocaine 1% with epinephrine is used for local anesthesia. A sharp blade is used to remove a portion of the lesion. The specimen is sent for pathology. Hemostasis is obtained with aluminum chloride and/or monopolar hyfrecation if needed. The area is then dressed and bandaged. The patient tolerated the procedure well without adverse event. Written instructions on wound care were given and were reviewed with the patient, who is to call for any signs of bleeding or infection. The patient will be notified of the pathology results.    Actinic keratosis  Cryosurgery procedure note:  Risk, benefits, and alternatives of cryosurgery are discussed with the patient, including but not limited to the risks of hypopigmentation, hyperpigmentation, scar, infection, recurrence of lesion(s), development of new lesion(s), and need for additional treatment of the lesion(s). Verbal consent obtained from patient. Liquid nitrogen cryosurgery applied to 4 lesion(s) to produce a freeze injury. Counseled patient that blisters may form, and instructed patient on wound care with gentle cleansing and use of Vaseline ointment to keep moist until healed. Handout was provided, and patient was instructed to return to clinic in 1-2 months if lesions do not completely resolve.    Seborrheic keratoses  These are benign, inherited growths without a malignant potential. Reassurance given to patient. No treatment is necessary. Handout was provided.    Melanocytic nevus of trunk  Multiple benign-appearing nevi present on exam today. Reassurance provided. Counseled patient to periodically examine moles and return to clinic if any changes in size, shape, or color are noted or if it becomes symptomatic (bleeding, itching, pain, etc).    Angioma of  skin  This is a benign vascular lesion. Reassurance given. No treatment required.    Lentigines  These are benign sun spots which should be monitored for changes. Daily sun protection will reduce the number of new lesions.   Patient instructed in importance of daily broad-spectrum sunscreen use with SPF of at least 30. Sun avoidance and topical protection/protective clothing discussed.      Follow up in about 6 months (around 3/14/2021) for TBSE, or sooner dependent on pathology results.

## 2020-09-17 LAB
FINAL PATHOLOGIC DIAGNOSIS: NORMAL
GROSS: NORMAL
MICROSCOPIC EXAM: NORMAL

## 2020-09-21 ENCOUNTER — TELEPHONE (OUTPATIENT)
Dept: DERMATOLOGY | Facility: CLINIC | Age: 65
End: 2020-09-21

## 2020-09-21 DIAGNOSIS — C44.612 BASAL CELL CARCINOMA (BCC) OF SKIN OF RIGHT UPPER EXTREMITY INCLUDING SHOULDER: Primary | ICD-10-CM

## 2020-09-21 NOTE — TELEPHONE ENCOUNTER
Spoke with pt re: below path results. Discussed R/B/A of excision vs ED&C. Pt would like to proceed with ED&C for both BCCs.      1.  Skin, right lateral neck, shave biopsy:   -LICHENOID KERATOSIS, see comment   COMMENT: The finding of a lichenoid dermatitis pattern in a solitary lesion   is characteristic of a lichenoid keratosis.  Some of these lesions are   secondary changes in seborrheic keratoses, solar lentigos, verrucae, and   other conditions.   2.  Skin, right anterior shoulder, shave biopsy:   -BASAL CELL CARCINOMA, NODULAR AND SUPERFICIAL TYPE, NARROWLY EXCISED IN THE PLANES OF SECTION EXAMINED   3.  Skin, right posterior shoulder, shave biopsy:   -BASAL CELL CARCINOMA, SUPERFICIAL TYPE, NARROWLY EXCISED IN THE PLANES OF THE SECTIONS EXAMINED, see comment   Comment:  The tumor appears narrowly excised in the planes of section   examined.  However, given the skipped nature of superficial basal cell   carcinomas, the lesion may extend outside the peripheral margins examined and   therefore clinical correlation is recommended.

## 2020-09-22 ENCOUNTER — OFFICE VISIT (OUTPATIENT)
Dept: DERMATOLOGY | Facility: CLINIC | Age: 65
End: 2020-09-22
Payer: COMMERCIAL

## 2020-09-22 DIAGNOSIS — C44.612 BASAL CELL CARCINOMA (BCC) OF SKIN OF RIGHT UPPER EXTREMITY INCLUDING SHOULDER: Primary | ICD-10-CM

## 2020-09-22 PROCEDURE — 99499 NO LOS: ICD-10-PCS | Mod: S$GLB,,, | Performed by: DERMATOLOGY

## 2020-09-22 PROCEDURE — 99499 UNLISTED E&M SERVICE: CPT | Mod: S$GLB,,, | Performed by: DERMATOLOGY

## 2020-09-22 PROCEDURE — 17262 DSTRJ MAL LES T/A/L 1.1-2.0: CPT | Mod: 79,S$GLB,, | Performed by: DERMATOLOGY

## 2020-09-22 PROCEDURE — 17262 PR DESTR MALIG TRUNK,EXTREM 1.1-2 CM: ICD-10-PCS | Mod: 79,S$GLB,, | Performed by: DERMATOLOGY

## 2020-09-22 NOTE — PROGRESS NOTES
Electrodessication and Curettage Procedure Note #1    DIAGNOSIS: basal cell carcinoma, superficial and nodular    LOCATION: right anterior shoulder    ASSISTANT: Nidhi Jackson LPN    ANESTHESIA:  1% Lidocaine with Epinephrine and Sodium bicarbonate, 1 mL    PROCEDURE:   Risk, benefits, and alternatives are discussed with the patient, and the patient agrees to the procedure by written and verbal consent. The area is cleansed with alcohol. Lidocaine 1% with epinephrine and sodium bicarbonate is used for local anesthesia. A # 3 curette is used to remove the lesion. Three cycles of curettage followed by electrodesiccation are performed. Final hemostasis is obtained with aluminum chloride.     The area is then dressed and bandaged. The patient tolerated the procedure well without adverse event. Instructions on wound care are given to the patient, who is to call for bleeding or infection.    Final Defect: 1.4 x 1.2 cm  --------------------------------------------    Electrodessication and Curettage Procedure Note #2    DIAGNOSIS: basal cell carcinoma, superficial     LOCATION:  right posterior shoulder    ASSISTANT: Nidhi Jackson LPN    ANESTHESIA:  1% Lidocaine with Epinephrine and Sodium bicarbonate, 1 mL    PROCEDURE:   Risk, benefits, and alternatives are discussed with the patient, and the patient agrees to the procedure by written and verbal consent. The area is cleansed with alcohol. Lidocaine 1% with epinephrine and sodium bicarbonate is used for local anesthesia. A # 3 curette is used to remove the lesion. Three cycles of curettage followed by electrodesiccation are performed. Final hemostasis is obtained with aluminum chloride.     The area is then dressed and bandaged. The patient tolerated the procedure well without adverse event. Instructions on wound care are given to the patient, who is to call for bleeding or infection.    Final Defect: 1.5 x 1.3 cm

## 2020-12-02 ENCOUNTER — PATIENT MESSAGE (OUTPATIENT)
Dept: INTERNAL MEDICINE | Facility: CLINIC | Age: 65
End: 2020-12-02

## 2020-12-02 DIAGNOSIS — Z00.00 ANNUAL PHYSICAL EXAM: Primary | ICD-10-CM

## 2020-12-02 DIAGNOSIS — Z12.5 PROSTATE CANCER SCREENING: ICD-10-CM

## 2020-12-03 ENCOUNTER — PATIENT MESSAGE (OUTPATIENT)
Dept: INTERNAL MEDICINE | Facility: CLINIC | Age: 65
End: 2020-12-03

## 2020-12-08 ENCOUNTER — LAB VISIT (OUTPATIENT)
Dept: LAB | Facility: HOSPITAL | Age: 65
End: 2020-12-08
Attending: INTERNAL MEDICINE
Payer: COMMERCIAL

## 2020-12-08 DIAGNOSIS — Z00.00 ANNUAL PHYSICAL EXAM: ICD-10-CM

## 2020-12-08 DIAGNOSIS — Z12.5 PROSTATE CANCER SCREENING: ICD-10-CM

## 2020-12-08 LAB
ALBUMIN SERPL BCP-MCNC: 3.9 G/DL (ref 3.5–5.2)
ALP SERPL-CCNC: 47 U/L (ref 55–135)
ALT SERPL W/O P-5'-P-CCNC: 30 U/L (ref 10–44)
ANION GAP SERPL CALC-SCNC: 7 MMOL/L (ref 8–16)
AST SERPL-CCNC: 38 U/L (ref 10–40)
BASOPHILS # BLD AUTO: 0.03 K/UL (ref 0–0.2)
BASOPHILS NFR BLD: 1 % (ref 0–1.9)
BILIRUB SERPL-MCNC: 0.7 MG/DL (ref 0.1–1)
BUN SERPL-MCNC: 20 MG/DL (ref 8–23)
CALCIUM SERPL-MCNC: 9.2 MG/DL (ref 8.7–10.5)
CHLORIDE SERPL-SCNC: 99 MMOL/L (ref 95–110)
CHOLEST SERPL-MCNC: 257 MG/DL (ref 120–199)
CHOLEST/HDLC SERPL: 2.5 {RATIO} (ref 2–5)
CO2 SERPL-SCNC: 28 MMOL/L (ref 23–29)
COMPLEXED PSA SERPL-MCNC: 0.62 NG/ML (ref 0–4)
CREAT SERPL-MCNC: 1 MG/DL (ref 0.5–1.4)
DIFFERENTIAL METHOD: ABNORMAL
EOSINOPHIL # BLD AUTO: 0.1 K/UL (ref 0–0.5)
EOSINOPHIL NFR BLD: 2 % (ref 0–8)
ERYTHROCYTE [DISTWIDTH] IN BLOOD BY AUTOMATED COUNT: 12.6 % (ref 11.5–14.5)
EST. GFR  (AFRICAN AMERICAN): >60 ML/MIN/1.73 M^2
EST. GFR  (NON AFRICAN AMERICAN): >60 ML/MIN/1.73 M^2
ESTIMATED AVG GLUCOSE: 105 MG/DL (ref 68–131)
GLUCOSE SERPL-MCNC: 98 MG/DL (ref 70–110)
HBA1C MFR BLD HPLC: 5.3 % (ref 4–5.6)
HCT VFR BLD AUTO: 43.4 % (ref 40–54)
HDLC SERPL-MCNC: 104 MG/DL (ref 40–75)
HDLC SERPL: 40.5 % (ref 20–50)
HGB BLD-MCNC: 13.7 G/DL (ref 14–18)
IMM GRANULOCYTES # BLD AUTO: 0 K/UL (ref 0–0.04)
IMM GRANULOCYTES NFR BLD AUTO: 0 % (ref 0–0.5)
LDLC SERPL CALC-MCNC: 143.4 MG/DL (ref 63–159)
LYMPHOCYTES # BLD AUTO: 0.9 K/UL (ref 1–4.8)
LYMPHOCYTES NFR BLD: 30 % (ref 18–48)
MCH RBC QN AUTO: 31.1 PG (ref 27–31)
MCHC RBC AUTO-ENTMCNC: 31.6 G/DL (ref 32–36)
MCV RBC AUTO: 99 FL (ref 82–98)
MONOCYTES # BLD AUTO: 0.3 K/UL (ref 0.3–1)
MONOCYTES NFR BLD: 9.8 % (ref 4–15)
NEUTROPHILS # BLD AUTO: 1.8 K/UL (ref 1.8–7.7)
NEUTROPHILS NFR BLD: 57.2 % (ref 38–73)
NONHDLC SERPL-MCNC: 153 MG/DL
NRBC BLD-RTO: 0 /100 WBC
PLATELET # BLD AUTO: 220 K/UL (ref 150–350)
PMV BLD AUTO: 10.8 FL (ref 9.2–12.9)
POTASSIUM SERPL-SCNC: 5.4 MMOL/L (ref 3.5–5.1)
PROT SERPL-MCNC: 6.5 G/DL (ref 6–8.4)
RBC # BLD AUTO: 4.4 M/UL (ref 4.6–6.2)
SODIUM SERPL-SCNC: 134 MMOL/L (ref 136–145)
TRIGL SERPL-MCNC: 48 MG/DL (ref 30–150)
TSH SERPL DL<=0.005 MIU/L-ACNC: 1.42 UIU/ML (ref 0.4–4)
WBC # BLD AUTO: 3.07 K/UL (ref 3.9–12.7)

## 2020-12-08 PROCEDURE — 80061 LIPID PANEL: CPT

## 2020-12-08 PROCEDURE — 83036 HEMOGLOBIN GLYCOSYLATED A1C: CPT

## 2020-12-08 PROCEDURE — 85025 COMPLETE CBC W/AUTO DIFF WBC: CPT

## 2020-12-08 PROCEDURE — 84153 ASSAY OF PSA TOTAL: CPT

## 2020-12-08 PROCEDURE — 36415 COLL VENOUS BLD VENIPUNCTURE: CPT | Mod: PN

## 2020-12-08 PROCEDURE — 84443 ASSAY THYROID STIM HORMONE: CPT

## 2020-12-08 PROCEDURE — 80053 COMPREHEN METABOLIC PANEL: CPT

## 2020-12-10 ENCOUNTER — PATIENT MESSAGE (OUTPATIENT)
Dept: INTERNAL MEDICINE | Facility: CLINIC | Age: 65
End: 2020-12-10

## 2020-12-10 ENCOUNTER — TELEPHONE (OUTPATIENT)
Dept: INTERNAL MEDICINE | Facility: CLINIC | Age: 65
End: 2020-12-10

## 2020-12-10 ENCOUNTER — LAB VISIT (OUTPATIENT)
Dept: LAB | Facility: OTHER | Age: 65
End: 2020-12-10
Attending: INTERNAL MEDICINE
Payer: COMMERCIAL

## 2020-12-10 ENCOUNTER — OFFICE VISIT (OUTPATIENT)
Dept: INTERNAL MEDICINE | Facility: CLINIC | Age: 65
End: 2020-12-10
Attending: INTERNAL MEDICINE
Payer: COMMERCIAL

## 2020-12-10 VITALS
HEIGHT: 70 IN | WEIGHT: 154.31 LBS | SYSTOLIC BLOOD PRESSURE: 117 MMHG | BODY MASS INDEX: 22.09 KG/M2 | OXYGEN SATURATION: 98 % | HEART RATE: 58 BPM | DIASTOLIC BLOOD PRESSURE: 76 MMHG

## 2020-12-10 DIAGNOSIS — E87.1 HYPONATREMIA: ICD-10-CM

## 2020-12-10 DIAGNOSIS — E87.5 HYPERKALEMIA: ICD-10-CM

## 2020-12-10 DIAGNOSIS — Z00.00 ANNUAL PHYSICAL EXAM: Primary | ICD-10-CM

## 2020-12-10 DIAGNOSIS — D64.89 ANEMIA DUE TO OTHER CAUSE, NOT CLASSIFIED: ICD-10-CM

## 2020-12-10 LAB
ANION GAP SERPL CALC-SCNC: 8 MMOL/L (ref 8–16)
BASOPHILS # BLD AUTO: 0.03 K/UL (ref 0–0.2)
BASOPHILS NFR BLD: 1 % (ref 0–1.9)
BUN SERPL-MCNC: 26 MG/DL (ref 8–23)
CALCIUM SERPL-MCNC: 9.3 MG/DL (ref 8.7–10.5)
CHLORIDE SERPL-SCNC: 100 MMOL/L (ref 95–110)
CO2 SERPL-SCNC: 28 MMOL/L (ref 23–29)
CREAT SERPL-MCNC: 1 MG/DL (ref 0.5–1.4)
DIFFERENTIAL METHOD: ABNORMAL
EOSINOPHIL # BLD AUTO: 0 K/UL (ref 0–0.5)
EOSINOPHIL NFR BLD: 1.4 % (ref 0–8)
ERYTHROCYTE [DISTWIDTH] IN BLOOD BY AUTOMATED COUNT: 12.5 % (ref 11.5–14.5)
EST. GFR  (AFRICAN AMERICAN): >60 ML/MIN/1.73 M^2
EST. GFR  (NON AFRICAN AMERICAN): >60 ML/MIN/1.73 M^2
GLUCOSE SERPL-MCNC: 64 MG/DL (ref 70–110)
HCT VFR BLD AUTO: 40 % (ref 40–54)
HGB BLD-MCNC: 13.1 G/DL (ref 14–18)
IMM GRANULOCYTES # BLD AUTO: 0.01 K/UL (ref 0–0.04)
IMM GRANULOCYTES NFR BLD AUTO: 0.3 % (ref 0–0.5)
LYMPHOCYTES # BLD AUTO: 0.7 K/UL (ref 1–4.8)
LYMPHOCYTES NFR BLD: 24.5 % (ref 18–48)
MCH RBC QN AUTO: 31.3 PG (ref 27–31)
MCHC RBC AUTO-ENTMCNC: 32.8 G/DL (ref 32–36)
MCV RBC AUTO: 96 FL (ref 82–98)
MONOCYTES # BLD AUTO: 0.3 K/UL (ref 0.3–1)
MONOCYTES NFR BLD: 10.5 % (ref 4–15)
NEUTROPHILS # BLD AUTO: 1.8 K/UL (ref 1.8–7.7)
NEUTROPHILS NFR BLD: 62.3 % (ref 38–73)
NRBC BLD-RTO: 0 /100 WBC
PLATELET # BLD AUTO: 218 K/UL (ref 150–350)
PMV BLD AUTO: 10.4 FL (ref 9.2–12.9)
POTASSIUM SERPL-SCNC: 5.2 MMOL/L (ref 3.5–5.1)
RBC # BLD AUTO: 4.19 M/UL (ref 4.6–6.2)
SODIUM SERPL-SCNC: 136 MMOL/L (ref 136–145)
VIT B12 SERPL-MCNC: 669 PG/ML (ref 210–950)
WBC # BLD AUTO: 2.94 K/UL (ref 3.9–12.7)

## 2020-12-10 PROCEDURE — 84165 PATHOLOGIST INTERPRETATION SPE: ICD-10-PCS | Mod: 26,,, | Performed by: PATHOLOGY

## 2020-12-10 PROCEDURE — 82607 VITAMIN B-12: CPT

## 2020-12-10 PROCEDURE — 99999 PR PBB SHADOW E&M-EST. PATIENT-LVL III: CPT | Mod: PBBFAC,,, | Performed by: INTERNAL MEDICINE

## 2020-12-10 PROCEDURE — 85025 COMPLETE CBC W/AUTO DIFF WBC: CPT

## 2020-12-10 PROCEDURE — 84165 PROTEIN E-PHORESIS SERUM: CPT | Mod: 26,,, | Performed by: PATHOLOGY

## 2020-12-10 PROCEDURE — 99396 PREV VISIT EST AGE 40-64: CPT | Mod: S$GLB,,, | Performed by: INTERNAL MEDICINE

## 2020-12-10 PROCEDURE — 1126F AMNT PAIN NOTED NONE PRSNT: CPT | Mod: S$GLB,,, | Performed by: INTERNAL MEDICINE

## 2020-12-10 PROCEDURE — 99396 PR PREVENTIVE VISIT,EST,40-64: ICD-10-PCS | Mod: S$GLB,,, | Performed by: INTERNAL MEDICINE

## 2020-12-10 PROCEDURE — 84165 PROTEIN E-PHORESIS SERUM: CPT

## 2020-12-10 PROCEDURE — 99999 PR PBB SHADOW E&M-EST. PATIENT-LVL III: ICD-10-PCS | Mod: PBBFAC,,, | Performed by: INTERNAL MEDICINE

## 2020-12-10 PROCEDURE — 1126F PR PAIN SEVERITY QUANTIFIED, NO PAIN PRESENT: ICD-10-PCS | Mod: S$GLB,,, | Performed by: INTERNAL MEDICINE

## 2020-12-10 PROCEDURE — 80048 BASIC METABOLIC PNL TOTAL CA: CPT

## 2020-12-10 PROCEDURE — 3008F BODY MASS INDEX DOCD: CPT | Mod: CPTII,S$GLB,, | Performed by: INTERNAL MEDICINE

## 2020-12-10 PROCEDURE — 3008F PR BODY MASS INDEX (BMI) DOCUMENTED: ICD-10-PCS | Mod: CPTII,S$GLB,, | Performed by: INTERNAL MEDICINE

## 2020-12-10 PROCEDURE — 82746 ASSAY OF FOLIC ACID SERUM: CPT

## 2020-12-10 PROCEDURE — 36415 COLL VENOUS BLD VENIPUNCTURE: CPT

## 2020-12-10 NOTE — TELEPHONE ENCOUNTER
----- Message from Arlyn Recio sent at 12/10/2020  9:03 AM CST -----  Who Called: Chapito FARMER is the reqeust in detail: Patient need instruction on how to collect his urine sample for lab. Please adviseCan the clinic reply by [facility]?: Jeison Call Back Number: [phone]

## 2020-12-10 NOTE — TELEPHONE ENCOUNTER
Spoke to Homa Jana and informed him to discard his first urine and collect the urine after first void.

## 2020-12-11 ENCOUNTER — LAB VISIT (OUTPATIENT)
Dept: LAB | Facility: HOSPITAL | Age: 65
End: 2020-12-11
Attending: INTERNAL MEDICINE
Payer: COMMERCIAL

## 2020-12-11 DIAGNOSIS — D64.89 ANEMIA DUE TO OTHER CAUSE, NOT CLASSIFIED: ICD-10-CM

## 2020-12-11 LAB
ALBUMIN SERPL ELPH-MCNC: 4.04 G/DL (ref 3.35–5.55)
ALPHA1 GLOB SERPL ELPH-MCNC: 0.19 G/DL (ref 0.17–0.41)
ALPHA2 GLOB SERPL ELPH-MCNC: 0.52 G/DL (ref 0.43–0.99)
B-GLOBULIN SERPL ELPH-MCNC: 0.51 G/DL (ref 0.5–1.1)
GAMMA GLOB SERPL ELPH-MCNC: 0.84 G/DL (ref 0.67–1.58)
PATHOLOGIST INTERPRETATION SPE: NORMAL
PROT SERPL-MCNC: 6.1 G/DL (ref 6–8.4)

## 2020-12-11 PROCEDURE — 84166 PROTEIN E-PHORESIS/URINE/CSF: CPT

## 2020-12-11 PROCEDURE — 84166 PROTEIN E-PHORESIS/URINE/CSF: CPT | Mod: 26,,, | Performed by: PATHOLOGY

## 2020-12-11 PROCEDURE — 84166 PATHOLOGIST INTERPRETATION UPE: ICD-10-PCS | Mod: 26,,, | Performed by: PATHOLOGY

## 2020-12-11 PROCEDURE — 84156 ASSAY OF PROTEIN URINE: CPT

## 2020-12-12 LAB
FOLATE SERPL-MCNC: 33 NG/ML (ref 4–24)
PROT 24H UR-MRATE: NORMAL MG/SPEC (ref 0–100)
PROT UR-MCNC: <7 MG/DL (ref 0–15)
URINE COLLECTION DURATION: 24 HR
URINE VOLUME: 3100 ML

## 2020-12-14 ENCOUNTER — PATIENT MESSAGE (OUTPATIENT)
Dept: INTERNAL MEDICINE | Facility: CLINIC | Age: 65
End: 2020-12-14

## 2020-12-14 LAB — PROT PATTERN UR ELPH-IMP: NORMAL

## 2020-12-15 LAB — PATHOLOGIST INTERPRETATION UPE: NORMAL

## 2021-05-16 ENCOUNTER — OFFICE VISIT (OUTPATIENT)
Dept: URGENT CARE | Facility: CLINIC | Age: 66
End: 2021-05-16
Payer: MEDICARE

## 2021-05-16 VITALS
BODY MASS INDEX: 22.05 KG/M2 | RESPIRATION RATE: 16 BRPM | DIASTOLIC BLOOD PRESSURE: 73 MMHG | HEIGHT: 70 IN | TEMPERATURE: 98 F | SYSTOLIC BLOOD PRESSURE: 110 MMHG | HEART RATE: 52 BPM | WEIGHT: 154 LBS | OXYGEN SATURATION: 98 %

## 2021-05-16 DIAGNOSIS — J02.9 PHARYNGITIS, UNSPECIFIED ETIOLOGY: Primary | ICD-10-CM

## 2021-05-16 DIAGNOSIS — R09.82 POST-NASAL DRIP: ICD-10-CM

## 2021-05-16 LAB
CTP QC/QA: YES
CTP QC/QA: YES
S PYO RRNA THROAT QL PROBE: NEGATIVE
SARS-COV-2 RDRP RESP QL NAA+PROBE: NEGATIVE

## 2021-05-16 PROCEDURE — 87880 POCT RAPID STREP A: ICD-10-PCS | Mod: QW,S$GLB,, | Performed by: PHYSICIAN ASSISTANT

## 2021-05-16 PROCEDURE — 3008F PR BODY MASS INDEX (BMI) DOCUMENTED: ICD-10-PCS | Mod: CPTII,S$GLB,, | Performed by: PHYSICIAN ASSISTANT

## 2021-05-16 PROCEDURE — U0002 COVID-19 LAB TEST NON-CDC: HCPCS | Mod: QW,S$GLB,, | Performed by: PHYSICIAN ASSISTANT

## 2021-05-16 PROCEDURE — 87880 STREP A ASSAY W/OPTIC: CPT | Mod: QW,S$GLB,, | Performed by: PHYSICIAN ASSISTANT

## 2021-05-16 PROCEDURE — 99213 OFFICE O/P EST LOW 20 MIN: CPT | Mod: 25,S$GLB,CS, | Performed by: PHYSICIAN ASSISTANT

## 2021-05-16 PROCEDURE — 1125F PR PAIN SEVERITY QUANTIFIED, PAIN PRESENT: ICD-10-PCS | Mod: S$GLB,,, | Performed by: PHYSICIAN ASSISTANT

## 2021-05-16 PROCEDURE — U0002: ICD-10-PCS | Mod: QW,S$GLB,, | Performed by: PHYSICIAN ASSISTANT

## 2021-05-16 PROCEDURE — 99213 PR OFFICE/OUTPT VISIT, EST, LEVL III, 20-29 MIN: ICD-10-PCS | Mod: 25,S$GLB,CS, | Performed by: PHYSICIAN ASSISTANT

## 2021-05-16 PROCEDURE — 1125F AMNT PAIN NOTED PAIN PRSNT: CPT | Mod: S$GLB,,, | Performed by: PHYSICIAN ASSISTANT

## 2021-05-16 PROCEDURE — 3008F BODY MASS INDEX DOCD: CPT | Mod: CPTII,S$GLB,, | Performed by: PHYSICIAN ASSISTANT

## 2021-06-03 ENCOUNTER — PATIENT MESSAGE (OUTPATIENT)
Dept: INTERNAL MEDICINE | Facility: CLINIC | Age: 66
End: 2021-06-03

## 2021-06-03 DIAGNOSIS — D64.9 ANEMIA, UNSPECIFIED TYPE: ICD-10-CM

## 2021-06-03 DIAGNOSIS — E87.5 HYPERKALEMIA: Primary | ICD-10-CM

## 2021-06-04 ENCOUNTER — PATIENT MESSAGE (OUTPATIENT)
Dept: INTERNAL MEDICINE | Facility: CLINIC | Age: 66
End: 2021-06-04

## 2021-06-09 ENCOUNTER — LAB VISIT (OUTPATIENT)
Dept: LAB | Facility: HOSPITAL | Age: 66
End: 2021-06-09
Attending: INTERNAL MEDICINE
Payer: MEDICARE

## 2021-06-09 DIAGNOSIS — D64.9 ANEMIA, UNSPECIFIED TYPE: ICD-10-CM

## 2021-06-09 DIAGNOSIS — E87.5 HYPERKALEMIA: ICD-10-CM

## 2021-06-09 LAB
ANION GAP SERPL CALC-SCNC: 8 MMOL/L (ref 8–16)
BASOPHILS # BLD AUTO: 0.03 K/UL (ref 0–0.2)
BASOPHILS NFR BLD: 1.1 % (ref 0–1.9)
BUN SERPL-MCNC: 24 MG/DL (ref 8–23)
CALCIUM SERPL-MCNC: 9.1 MG/DL (ref 8.7–10.5)
CHLORIDE SERPL-SCNC: 103 MMOL/L (ref 95–110)
CO2 SERPL-SCNC: 26 MMOL/L (ref 23–29)
CREAT SERPL-MCNC: 0.9 MG/DL (ref 0.5–1.4)
DIFFERENTIAL METHOD: ABNORMAL
EOSINOPHIL # BLD AUTO: 0.1 K/UL (ref 0–0.5)
EOSINOPHIL NFR BLD: 2.9 % (ref 0–8)
ERYTHROCYTE [DISTWIDTH] IN BLOOD BY AUTOMATED COUNT: 13 % (ref 11.5–14.5)
EST. GFR  (AFRICAN AMERICAN): >60 ML/MIN/1.73 M^2
EST. GFR  (NON AFRICAN AMERICAN): >60 ML/MIN/1.73 M^2
GLUCOSE SERPL-MCNC: 91 MG/DL (ref 70–110)
HCT VFR BLD AUTO: 40.7 % (ref 40–54)
HGB BLD-MCNC: 13.2 G/DL (ref 14–18)
IMM GRANULOCYTES # BLD AUTO: 0.01 K/UL (ref 0–0.04)
IMM GRANULOCYTES NFR BLD AUTO: 0.4 % (ref 0–0.5)
LYMPHOCYTES # BLD AUTO: 1 K/UL (ref 1–4.8)
LYMPHOCYTES NFR BLD: 37.8 % (ref 18–48)
MCH RBC QN AUTO: 30.3 PG (ref 27–31)
MCHC RBC AUTO-ENTMCNC: 32.4 G/DL (ref 32–36)
MCV RBC AUTO: 94 FL (ref 82–98)
MONOCYTES # BLD AUTO: 0.4 K/UL (ref 0.3–1)
MONOCYTES NFR BLD: 13.1 % (ref 4–15)
NEUTROPHILS # BLD AUTO: 1.2 K/UL (ref 1.8–7.7)
NEUTROPHILS NFR BLD: 44.7 % (ref 38–73)
NRBC BLD-RTO: 0 /100 WBC
PLATELET # BLD AUTO: 215 K/UL (ref 150–450)
PMV BLD AUTO: 11.2 FL (ref 9.2–12.9)
POTASSIUM SERPL-SCNC: 4.1 MMOL/L (ref 3.5–5.1)
RBC # BLD AUTO: 4.35 M/UL (ref 4.6–6.2)
SODIUM SERPL-SCNC: 137 MMOL/L (ref 136–145)
WBC # BLD AUTO: 2.75 K/UL (ref 3.9–12.7)

## 2021-06-09 PROCEDURE — 80048 BASIC METABOLIC PNL TOTAL CA: CPT | Performed by: INTERNAL MEDICINE

## 2021-06-09 PROCEDURE — 85025 COMPLETE CBC W/AUTO DIFF WBC: CPT | Performed by: INTERNAL MEDICINE

## 2021-06-09 PROCEDURE — 36415 COLL VENOUS BLD VENIPUNCTURE: CPT | Mod: PN | Performed by: INTERNAL MEDICINE

## 2021-07-08 ENCOUNTER — PATIENT OUTREACH (OUTPATIENT)
Dept: ADMINISTRATIVE | Facility: OTHER | Age: 66
End: 2021-07-08

## 2021-07-09 ENCOUNTER — PATIENT MESSAGE (OUTPATIENT)
Dept: DERMATOLOGY | Facility: CLINIC | Age: 66
End: 2021-07-09

## 2021-07-13 ENCOUNTER — OFFICE VISIT (OUTPATIENT)
Dept: DERMATOLOGY | Facility: CLINIC | Age: 66
End: 2021-07-13
Payer: MEDICARE

## 2021-07-13 ENCOUNTER — PATIENT MESSAGE (OUTPATIENT)
Dept: INTERNAL MEDICINE | Facility: CLINIC | Age: 66
End: 2021-07-13

## 2021-07-13 DIAGNOSIS — L82.1 SEBORRHEIC KERATOSES: ICD-10-CM

## 2021-07-13 DIAGNOSIS — D18.01 ANGIOMA OF SKIN: ICD-10-CM

## 2021-07-13 DIAGNOSIS — L90.5 SCAR: ICD-10-CM

## 2021-07-13 DIAGNOSIS — D22.5 MELANOCYTIC NEVUS OF TRUNK: ICD-10-CM

## 2021-07-13 DIAGNOSIS — Z85.828 HISTORY OF NONMELANOMA SKIN CANCER: ICD-10-CM

## 2021-07-13 DIAGNOSIS — L57.0 ACTINIC KERATOSIS: Primary | ICD-10-CM

## 2021-07-13 DIAGNOSIS — L81.4 LENTIGINES: ICD-10-CM

## 2021-07-13 PROCEDURE — 17003 DESTRUCT PREMALG LES 2-14: CPT | Mod: S$GLB,,, | Performed by: DERMATOLOGY

## 2021-07-13 PROCEDURE — 1126F AMNT PAIN NOTED NONE PRSNT: CPT | Mod: S$GLB,,, | Performed by: DERMATOLOGY

## 2021-07-13 PROCEDURE — 1126F PR PAIN SEVERITY QUANTIFIED, NO PAIN PRESENT: ICD-10-PCS | Mod: S$GLB,,, | Performed by: DERMATOLOGY

## 2021-07-13 PROCEDURE — 1101F PT FALLS ASSESS-DOCD LE1/YR: CPT | Mod: CPTII,S$GLB,, | Performed by: DERMATOLOGY

## 2021-07-13 PROCEDURE — 3288F FALL RISK ASSESSMENT DOCD: CPT | Mod: CPTII,S$GLB,, | Performed by: DERMATOLOGY

## 2021-07-13 PROCEDURE — 1101F PR PT FALLS ASSESS DOC 0-1 FALLS W/OUT INJ PAST YR: ICD-10-PCS | Mod: CPTII,S$GLB,, | Performed by: DERMATOLOGY

## 2021-07-13 PROCEDURE — 99213 PR OFFICE/OUTPT VISIT, EST, LEVL III, 20-29 MIN: ICD-10-PCS | Mod: 25,S$GLB,, | Performed by: DERMATOLOGY

## 2021-07-13 PROCEDURE — 17000 DESTRUCT PREMALG LESION: CPT | Mod: S$GLB,,, | Performed by: DERMATOLOGY

## 2021-07-13 PROCEDURE — 3288F PR FALLS RISK ASSESSMENT DOCUMENTED: ICD-10-PCS | Mod: CPTII,S$GLB,, | Performed by: DERMATOLOGY

## 2021-07-13 PROCEDURE — 17000 PR DESTRUCTION(LASER SURGERY,CRYOSURGERY,CHEMOSURGERY),PREMALIGNANT LESIONS,FIRST LESION: ICD-10-PCS | Mod: S$GLB,,, | Performed by: DERMATOLOGY

## 2021-07-13 PROCEDURE — 99213 OFFICE O/P EST LOW 20 MIN: CPT | Mod: 25,S$GLB,, | Performed by: DERMATOLOGY

## 2021-07-13 PROCEDURE — 17003 DESTRUCTION, PREMALIGNANT LESIONS; SECOND THROUGH 14 LESIONS: ICD-10-PCS | Mod: S$GLB,,, | Performed by: DERMATOLOGY

## 2021-08-19 ENCOUNTER — PATIENT MESSAGE (OUTPATIENT)
Dept: INTERNAL MEDICINE | Facility: CLINIC | Age: 66
End: 2021-08-19

## 2021-09-20 ENCOUNTER — PATIENT MESSAGE (OUTPATIENT)
Dept: INTERNAL MEDICINE | Facility: CLINIC | Age: 66
End: 2021-09-20

## 2021-10-23 ENCOUNTER — PATIENT MESSAGE (OUTPATIENT)
Dept: INTERNAL MEDICINE | Facility: CLINIC | Age: 66
End: 2021-10-23
Payer: MEDICARE

## 2021-10-23 DIAGNOSIS — Z13.6 ENCOUNTER FOR LIPID SCREENING FOR CARDIOVASCULAR DISEASE: ICD-10-CM

## 2021-10-23 DIAGNOSIS — Z12.5 PROSTATE CANCER SCREENING: ICD-10-CM

## 2021-10-23 DIAGNOSIS — E87.1 HYPONATREMIA: ICD-10-CM

## 2021-10-23 DIAGNOSIS — D64.9 ANEMIA, UNSPECIFIED TYPE: Primary | ICD-10-CM

## 2021-10-23 DIAGNOSIS — Z13.220 ENCOUNTER FOR LIPID SCREENING FOR CARDIOVASCULAR DISEASE: ICD-10-CM

## 2021-10-23 DIAGNOSIS — Z00.00 ANNUAL PHYSICAL EXAM: ICD-10-CM

## 2021-10-23 DIAGNOSIS — I10 PRIMARY HYPERTENSION: ICD-10-CM

## 2021-10-23 DIAGNOSIS — E87.5 HYPERKALEMIA: ICD-10-CM

## 2021-11-06 ENCOUNTER — PATIENT MESSAGE (OUTPATIENT)
Dept: INTERNAL MEDICINE | Facility: CLINIC | Age: 66
End: 2021-11-06
Payer: MEDICARE

## 2021-11-10 ENCOUNTER — PATIENT MESSAGE (OUTPATIENT)
Dept: INTERNAL MEDICINE | Facility: CLINIC | Age: 66
End: 2021-11-10
Payer: MEDICARE

## 2021-12-01 ENCOUNTER — IMMUNIZATION (OUTPATIENT)
Dept: PHARMACY | Facility: CLINIC | Age: 66
End: 2021-12-01
Payer: MEDICARE

## 2021-12-01 DIAGNOSIS — Z23 NEED FOR VACCINATION: Primary | ICD-10-CM

## 2021-12-06 ENCOUNTER — TELEPHONE (OUTPATIENT)
Dept: INTERNAL MEDICINE | Facility: CLINIC | Age: 66
End: 2021-12-06
Payer: MEDICARE

## 2021-12-06 ENCOUNTER — PATIENT MESSAGE (OUTPATIENT)
Dept: INTERNAL MEDICINE | Facility: CLINIC | Age: 66
End: 2021-12-06
Payer: MEDICARE

## 2021-12-07 ENCOUNTER — LAB VISIT (OUTPATIENT)
Dept: LAB | Facility: OTHER | Age: 66
End: 2021-12-07
Attending: INTERNAL MEDICINE
Payer: MEDICARE

## 2021-12-07 ENCOUNTER — OFFICE VISIT (OUTPATIENT)
Dept: INTERNAL MEDICINE | Facility: CLINIC | Age: 66
End: 2021-12-07
Attending: INTERNAL MEDICINE
Payer: MEDICARE

## 2021-12-07 VITALS
SYSTOLIC BLOOD PRESSURE: 122 MMHG | HEART RATE: 54 BPM | DIASTOLIC BLOOD PRESSURE: 74 MMHG | HEIGHT: 70 IN | WEIGHT: 155.88 LBS | OXYGEN SATURATION: 99 % | BODY MASS INDEX: 22.32 KG/M2

## 2021-12-07 DIAGNOSIS — Z13.220 ENCOUNTER FOR LIPID SCREENING FOR CARDIOVASCULAR DISEASE: ICD-10-CM

## 2021-12-07 DIAGNOSIS — M89.319 CLAVICLE ENLARGEMENT: Primary | ICD-10-CM

## 2021-12-07 DIAGNOSIS — E87.5 HYPERKALEMIA: ICD-10-CM

## 2021-12-07 DIAGNOSIS — D64.9 ANEMIA, UNSPECIFIED TYPE: ICD-10-CM

## 2021-12-07 DIAGNOSIS — Z12.5 PROSTATE CANCER SCREENING: ICD-10-CM

## 2021-12-07 DIAGNOSIS — Z13.6 ENCOUNTER FOR LIPID SCREENING FOR CARDIOVASCULAR DISEASE: ICD-10-CM

## 2021-12-07 LAB
ALBUMIN SERPL BCP-MCNC: 3.9 G/DL (ref 3.5–5.2)
ALP SERPL-CCNC: 56 U/L (ref 55–135)
ALT SERPL W/O P-5'-P-CCNC: 31 U/L (ref 10–44)
ANION GAP SERPL CALC-SCNC: 10 MMOL/L (ref 8–16)
AST SERPL-CCNC: 36 U/L (ref 10–40)
BASOPHILS # BLD AUTO: 0.03 K/UL (ref 0–0.2)
BASOPHILS NFR BLD: 0.8 % (ref 0–1.9)
BILIRUB SERPL-MCNC: 0.8 MG/DL (ref 0.1–1)
BUN SERPL-MCNC: 18 MG/DL (ref 8–23)
CALCIUM SERPL-MCNC: 9.3 MG/DL (ref 8.7–10.5)
CHLORIDE SERPL-SCNC: 100 MMOL/L (ref 95–110)
CHOLEST SERPL-MCNC: 263 MG/DL (ref 120–199)
CHOLEST/HDLC SERPL: 2.7 {RATIO} (ref 2–5)
CO2 SERPL-SCNC: 26 MMOL/L (ref 23–29)
COMPLEXED PSA SERPL-MCNC: 0.9 NG/ML (ref 0–4)
CREAT SERPL-MCNC: 1 MG/DL (ref 0.5–1.4)
DIFFERENTIAL METHOD: ABNORMAL
EOSINOPHIL # BLD AUTO: 0.1 K/UL (ref 0–0.5)
EOSINOPHIL NFR BLD: 3.4 % (ref 0–8)
ERYTHROCYTE [DISTWIDTH] IN BLOOD BY AUTOMATED COUNT: 12 % (ref 11.5–14.5)
EST. GFR  (AFRICAN AMERICAN): >60 ML/MIN/1.73 M^2
EST. GFR  (NON AFRICAN AMERICAN): >60 ML/MIN/1.73 M^2
ESTIMATED AVG GLUCOSE: 114 MG/DL (ref 68–131)
GLUCOSE SERPL-MCNC: 99 MG/DL (ref 70–110)
HBA1C MFR BLD: 5.6 % (ref 4–5.6)
HCT VFR BLD AUTO: 45.5 % (ref 40–54)
HDLC SERPL-MCNC: 96 MG/DL (ref 40–75)
HDLC SERPL: 36.5 % (ref 20–50)
HGB BLD-MCNC: 14.8 G/DL (ref 14–18)
IMM GRANULOCYTES # BLD AUTO: 0 K/UL (ref 0–0.04)
IMM GRANULOCYTES NFR BLD AUTO: 0 % (ref 0–0.5)
LDLC SERPL CALC-MCNC: 156.6 MG/DL (ref 63–159)
LYMPHOCYTES # BLD AUTO: 1 K/UL (ref 1–4.8)
LYMPHOCYTES NFR BLD: 29.1 % (ref 18–48)
MCH RBC QN AUTO: 30.8 PG (ref 27–31)
MCHC RBC AUTO-ENTMCNC: 32.5 G/DL (ref 32–36)
MCV RBC AUTO: 95 FL (ref 82–98)
MONOCYTES # BLD AUTO: 0.4 K/UL (ref 0.3–1)
MONOCYTES NFR BLD: 10.2 % (ref 4–15)
NEUTROPHILS # BLD AUTO: 2 K/UL (ref 1.8–7.7)
NEUTROPHILS NFR BLD: 56.5 % (ref 38–73)
NONHDLC SERPL-MCNC: 167 MG/DL
NRBC BLD-RTO: 0 /100 WBC
PLATELET # BLD AUTO: 234 K/UL (ref 150–450)
PMV BLD AUTO: 10.4 FL (ref 9.2–12.9)
POTASSIUM SERPL-SCNC: 4.7 MMOL/L (ref 3.5–5.1)
PROT SERPL-MCNC: 6.8 G/DL (ref 6–8.4)
RBC # BLD AUTO: 4.8 M/UL (ref 4.6–6.2)
SODIUM SERPL-SCNC: 136 MMOL/L (ref 136–145)
TRIGL SERPL-MCNC: 52 MG/DL (ref 30–150)
TSH SERPL DL<=0.005 MIU/L-ACNC: 1.5 UIU/ML (ref 0.4–4)
WBC # BLD AUTO: 3.54 K/UL (ref 3.9–12.7)

## 2021-12-07 PROCEDURE — 84443 ASSAY THYROID STIM HORMONE: CPT | Mod: HCNC | Performed by: INTERNAL MEDICINE

## 2021-12-07 PROCEDURE — 36415 COLL VENOUS BLD VENIPUNCTURE: CPT | Mod: HCNC | Performed by: INTERNAL MEDICINE

## 2021-12-07 PROCEDURE — 80061 LIPID PANEL: CPT | Mod: HCNC | Performed by: INTERNAL MEDICINE

## 2021-12-07 PROCEDURE — 99999 PR PBB SHADOW E&M-EST. PATIENT-LVL III: ICD-10-PCS | Mod: PBBFAC,HCNC,, | Performed by: INTERNAL MEDICINE

## 2021-12-07 PROCEDURE — 99214 OFFICE O/P EST MOD 30 MIN: CPT | Mod: HCNC,S$GLB,, | Performed by: INTERNAL MEDICINE

## 2021-12-07 PROCEDURE — 83036 HEMOGLOBIN GLYCOSYLATED A1C: CPT | Mod: HCNC | Performed by: INTERNAL MEDICINE

## 2021-12-07 PROCEDURE — 99999 PR PBB SHADOW E&M-EST. PATIENT-LVL III: CPT | Mod: PBBFAC,HCNC,, | Performed by: INTERNAL MEDICINE

## 2021-12-07 PROCEDURE — 85025 COMPLETE CBC W/AUTO DIFF WBC: CPT | Mod: HCNC | Performed by: INTERNAL MEDICINE

## 2021-12-07 PROCEDURE — 99214 PR OFFICE/OUTPT VISIT, EST, LEVL IV, 30-39 MIN: ICD-10-PCS | Mod: HCNC,S$GLB,, | Performed by: INTERNAL MEDICINE

## 2021-12-07 PROCEDURE — 80053 COMPREHEN METABOLIC PANEL: CPT | Mod: HCNC | Performed by: INTERNAL MEDICINE

## 2021-12-07 PROCEDURE — 84153 ASSAY OF PSA TOTAL: CPT | Mod: HCNC | Performed by: INTERNAL MEDICINE

## 2021-12-22 ENCOUNTER — OFFICE VISIT (OUTPATIENT)
Dept: INTERNAL MEDICINE | Facility: CLINIC | Age: 66
End: 2021-12-22
Attending: INTERNAL MEDICINE
Payer: MEDICARE

## 2021-12-22 VITALS
HEART RATE: 67 BPM | OXYGEN SATURATION: 98 % | DIASTOLIC BLOOD PRESSURE: 66 MMHG | BODY MASS INDEX: 22.44 KG/M2 | WEIGHT: 156.75 LBS | HEIGHT: 70 IN | SYSTOLIC BLOOD PRESSURE: 110 MMHG

## 2021-12-22 DIAGNOSIS — Z00.00 ANNUAL PHYSICAL EXAM: ICD-10-CM

## 2021-12-22 DIAGNOSIS — K21.9 GASTROESOPHAGEAL REFLUX DISEASE WITHOUT ESOPHAGITIS: ICD-10-CM

## 2021-12-22 DIAGNOSIS — D64.9 ANEMIA, UNSPECIFIED TYPE: ICD-10-CM

## 2021-12-22 DIAGNOSIS — E78.5 HYPERLIPIDEMIA, UNSPECIFIED HYPERLIPIDEMIA TYPE: ICD-10-CM

## 2021-12-22 DIAGNOSIS — M89.319 CLAVICLE ENLARGEMENT: Primary | ICD-10-CM

## 2021-12-22 PROCEDURE — 1160F RVW MEDS BY RX/DR IN RCRD: CPT | Mod: HCNC,CPTII,S$GLB, | Performed by: INTERNAL MEDICINE

## 2021-12-22 PROCEDURE — 3008F PR BODY MASS INDEX (BMI) DOCUMENTED: ICD-10-PCS | Mod: HCNC,CPTII,S$GLB, | Performed by: INTERNAL MEDICINE

## 2021-12-22 PROCEDURE — 99214 OFFICE O/P EST MOD 30 MIN: CPT | Mod: HCNC,S$GLB,, | Performed by: INTERNAL MEDICINE

## 2021-12-22 PROCEDURE — 3074F SYST BP LT 130 MM HG: CPT | Mod: HCNC,CPTII,S$GLB, | Performed by: INTERNAL MEDICINE

## 2021-12-22 PROCEDURE — 3008F BODY MASS INDEX DOCD: CPT | Mod: HCNC,CPTII,S$GLB, | Performed by: INTERNAL MEDICINE

## 2021-12-22 PROCEDURE — 99999 PR PBB SHADOW E&M-EST. PATIENT-LVL III: ICD-10-PCS | Mod: PBBFAC,HCNC,, | Performed by: INTERNAL MEDICINE

## 2021-12-22 PROCEDURE — 3078F PR MOST RECENT DIASTOLIC BLOOD PRESSURE < 80 MM HG: ICD-10-PCS | Mod: HCNC,CPTII,S$GLB, | Performed by: INTERNAL MEDICINE

## 2021-12-22 PROCEDURE — 3288F PR FALLS RISK ASSESSMENT DOCUMENTED: ICD-10-PCS | Mod: HCNC,CPTII,S$GLB, | Performed by: INTERNAL MEDICINE

## 2021-12-22 PROCEDURE — 1159F PR MEDICATION LIST DOCUMENTED IN MEDICAL RECORD: ICD-10-PCS | Mod: HCNC,CPTII,S$GLB, | Performed by: INTERNAL MEDICINE

## 2021-12-22 PROCEDURE — 3288F FALL RISK ASSESSMENT DOCD: CPT | Mod: HCNC,CPTII,S$GLB, | Performed by: INTERNAL MEDICINE

## 2021-12-22 PROCEDURE — 1101F PR PT FALLS ASSESS DOC 0-1 FALLS W/OUT INJ PAST YR: ICD-10-PCS | Mod: HCNC,CPTII,S$GLB, | Performed by: INTERNAL MEDICINE

## 2021-12-22 PROCEDURE — 99999 PR PBB SHADOW E&M-EST. PATIENT-LVL III: CPT | Mod: PBBFAC,HCNC,, | Performed by: INTERNAL MEDICINE

## 2021-12-22 PROCEDURE — 1160F PR REVIEW ALL MEDS BY PRESCRIBER/CLIN PHARMACIST DOCUMENTED: ICD-10-PCS | Mod: HCNC,CPTII,S$GLB, | Performed by: INTERNAL MEDICINE

## 2021-12-22 PROCEDURE — 3044F PR MOST RECENT HEMOGLOBIN A1C LEVEL <7.0%: ICD-10-PCS | Mod: HCNC,CPTII,S$GLB, | Performed by: INTERNAL MEDICINE

## 2021-12-22 PROCEDURE — 3074F PR MOST RECENT SYSTOLIC BLOOD PRESSURE < 130 MM HG: ICD-10-PCS | Mod: HCNC,CPTII,S$GLB, | Performed by: INTERNAL MEDICINE

## 2021-12-22 PROCEDURE — 1159F MED LIST DOCD IN RCRD: CPT | Mod: HCNC,CPTII,S$GLB, | Performed by: INTERNAL MEDICINE

## 2021-12-22 PROCEDURE — 3044F HG A1C LEVEL LT 7.0%: CPT | Mod: HCNC,CPTII,S$GLB, | Performed by: INTERNAL MEDICINE

## 2021-12-22 PROCEDURE — 3078F DIAST BP <80 MM HG: CPT | Mod: HCNC,CPTII,S$GLB, | Performed by: INTERNAL MEDICINE

## 2021-12-22 PROCEDURE — 99214 PR OFFICE/OUTPT VISIT, EST, LEVL IV, 30-39 MIN: ICD-10-PCS | Mod: HCNC,S$GLB,, | Performed by: INTERNAL MEDICINE

## 2021-12-22 PROCEDURE — 1101F PT FALLS ASSESS-DOCD LE1/YR: CPT | Mod: HCNC,CPTII,S$GLB, | Performed by: INTERNAL MEDICINE

## 2021-12-22 RX ORDER — PNEUMOCOCCAL VACCINE POLYVALENT 25; 25; 25; 25; 25; 25; 25; 25; 25; 25; 25; 25; 25; 25; 25; 25; 25; 25; 25; 25; 25; 25; 25 UG/.5ML; UG/.5ML; UG/.5ML; UG/.5ML; UG/.5ML; UG/.5ML; UG/.5ML; UG/.5ML; UG/.5ML; UG/.5ML; UG/.5ML; UG/.5ML; UG/.5ML; UG/.5ML; UG/.5ML; UG/.5ML; UG/.5ML; UG/.5ML; UG/.5ML; UG/.5ML; UG/.5ML; UG/.5ML; UG/.5ML
INJECTION, SOLUTION INTRAMUSCULAR; SUBCUTANEOUS
COMMUNITY
Start: 2021-08-18

## 2021-12-29 ENCOUNTER — HOSPITAL ENCOUNTER (OUTPATIENT)
Dept: RADIOLOGY | Facility: OTHER | Age: 66
Discharge: HOME | End: 2021-12-29
Attending: INTERNAL MEDICINE
Payer: MEDICARE

## 2021-12-29 DIAGNOSIS — M89.319 CLAVICLE ENLARGEMENT: ICD-10-CM

## 2021-12-29 PROCEDURE — 76604 US CHEST MEDIASTINUM: ICD-10-PCS | Mod: 26,HCNC,, | Performed by: RADIOLOGY

## 2021-12-29 PROCEDURE — 76604 US EXAM CHEST: CPT | Mod: 26,HCNC,, | Performed by: RADIOLOGY

## 2021-12-29 PROCEDURE — 76604 US EXAM CHEST: CPT | Mod: TC,HCNC

## 2021-12-30 ENCOUNTER — TELEPHONE (OUTPATIENT)
Dept: INTERNAL MEDICINE | Facility: CLINIC | Age: 66
End: 2021-12-30
Payer: MEDICARE

## 2021-12-30 DIAGNOSIS — M25.511 RIGHT SHOULDER PAIN, UNSPECIFIED CHRONICITY: Primary | ICD-10-CM

## 2021-12-30 DIAGNOSIS — M65.9 SYNOVITIS: Primary | ICD-10-CM

## 2021-12-30 NOTE — TELEPHONE ENCOUNTER
----- Message from Poly Yu MD sent at 12/30/2021  6:00 AM CST -----  Message sent to pt via my chart with results and updates to plan.     Please arrange ortho appt

## 2022-01-04 ENCOUNTER — OFFICE VISIT (OUTPATIENT)
Dept: PODIATRY | Facility: CLINIC | Age: 67
End: 2022-01-04
Payer: MEDICARE

## 2022-01-04 VITALS
DIASTOLIC BLOOD PRESSURE: 72 MMHG | SYSTOLIC BLOOD PRESSURE: 127 MMHG | WEIGHT: 159.38 LBS | BODY MASS INDEX: 22.87 KG/M2 | HEART RATE: 52 BPM

## 2022-01-04 DIAGNOSIS — M77.41 METATARSALGIA OF RIGHT FOOT: ICD-10-CM

## 2022-01-04 DIAGNOSIS — S93.601A RIGHT FOOT SPRAIN, INITIAL ENCOUNTER: Primary | ICD-10-CM

## 2022-01-04 PROCEDURE — 99203 OFFICE O/P NEW LOW 30 MIN: CPT | Mod: HCNC,S$GLB,, | Performed by: PODIATRIST

## 2022-01-04 PROCEDURE — 3008F PR BODY MASS INDEX (BMI) DOCUMENTED: ICD-10-PCS | Mod: HCNC,CPTII,S$GLB, | Performed by: PODIATRIST

## 2022-01-04 PROCEDURE — 99999 PR PBB SHADOW E&M-EST. PATIENT-LVL III: CPT | Mod: PBBFAC,HCNC,, | Performed by: PODIATRIST

## 2022-01-04 PROCEDURE — 99999 PR PBB SHADOW E&M-EST. PATIENT-LVL III: ICD-10-PCS | Mod: PBBFAC,HCNC,, | Performed by: PODIATRIST

## 2022-01-04 PROCEDURE — 1125F PR PAIN SEVERITY QUANTIFIED, PAIN PRESENT: ICD-10-PCS | Mod: HCNC,CPTII,S$GLB, | Performed by: PODIATRIST

## 2022-01-04 PROCEDURE — 3078F DIAST BP <80 MM HG: CPT | Mod: HCNC,CPTII,S$GLB, | Performed by: PODIATRIST

## 2022-01-04 PROCEDURE — 3078F PR MOST RECENT DIASTOLIC BLOOD PRESSURE < 80 MM HG: ICD-10-PCS | Mod: HCNC,CPTII,S$GLB, | Performed by: PODIATRIST

## 2022-01-04 PROCEDURE — 3074F SYST BP LT 130 MM HG: CPT | Mod: HCNC,CPTII,S$GLB, | Performed by: PODIATRIST

## 2022-01-04 PROCEDURE — 3008F BODY MASS INDEX DOCD: CPT | Mod: HCNC,CPTII,S$GLB, | Performed by: PODIATRIST

## 2022-01-04 PROCEDURE — 1125F AMNT PAIN NOTED PAIN PRSNT: CPT | Mod: HCNC,CPTII,S$GLB, | Performed by: PODIATRIST

## 2022-01-04 PROCEDURE — 1159F PR MEDICATION LIST DOCUMENTED IN MEDICAL RECORD: ICD-10-PCS | Mod: HCNC,CPTII,S$GLB, | Performed by: PODIATRIST

## 2022-01-04 PROCEDURE — 3074F PR MOST RECENT SYSTOLIC BLOOD PRESSURE < 130 MM HG: ICD-10-PCS | Mod: HCNC,CPTII,S$GLB, | Performed by: PODIATRIST

## 2022-01-04 PROCEDURE — 1159F MED LIST DOCD IN RCRD: CPT | Mod: HCNC,CPTII,S$GLB, | Performed by: PODIATRIST

## 2022-01-04 PROCEDURE — 99203 PR OFFICE/OUTPT VISIT, NEW, LEVL III, 30-44 MIN: ICD-10-PCS | Mod: HCNC,S$GLB,, | Performed by: PODIATRIST

## 2022-01-04 RX ORDER — MELOXICAM 15 MG/1
15 TABLET ORAL DAILY
Qty: 20 TABLET | Refills: 0 | Status: SHIPPED | OUTPATIENT
Start: 2022-01-04 | End: 2022-01-24

## 2022-01-04 NOTE — PROGRESS NOTES
Subjective:      Patient ID: Hugh Bates is a 66 y.o. male.    Chief Complaint:   Foot Pain (Mid foot ? Strain, plays Pickleball hurt it the Wed after [place]  hx of arch pain-has inserts from [employer])    Hugh is a 66 y.o. male who presents to the podiatry clinic  with complaint of  right foot pain. Onset of the symptoms was a week ago. Precipitating event: increased activity and Playing pickle ball. Current symptoms include: ability to bear weight, but with some pain and worsening symptoms after a period of activity. Aggravating factors: Increased activity. Symptoms have gradually improved. Patient has had prior foot problems. Evaluation to date: none. Treatment to date: avoidance of offending activity, rest and Continue his inserts and proper shoes. Patients rates pain  Mild on pain scale.    Patient relates he felt a pulling type sensation on the bottom of his right foot during pickle ball.  He was able to continue playing without significant pain.  Patient denies any weakness.  Denies any appreciable swelling or redness.  Patient relates he wants to continue to be active so wanted to have the foot checked out.  Patient relates he has not really taken any oral medications.  Patient also relates side noted that his left 2nd toe occasionally hurts.  He relates that it may have some arthritis as he may have had stubbed it before.  She usually only hurts after he has active.  He discusses both of his 2nd toes are longer than the others  He is very active with his pickle ball, likes running, and walking.    Patient relates he uses his Good feet inserts and has brought them today he actually has 2 pairs.  He would like to him evaluated as he knows there is a lifetime guarantee.  He also wears new balance tennis shoes mostly in has brought his new balance court shoes the plays in.    Patient occasionally wears dress shoes however he is a  mostly retired so not as often     Past Medical History:    Diagnosis Date    Basal cell carcinoma     Urinary tract infection      Past Surgical History:   Procedure Laterality Date    COLONOSCOPY N/A 9/30/2016    Procedure: COLONOSCOPY;  Surgeon: George Virgen MD;  Location: [facility] ([address] [place] [address]);  Service: Colon and Rectal;  Laterality: N/A;    COLONOSCOPY      FRACTURE SURGERY  1974    broken thumb     Current Outpatient Medications on File Prior to Visit   Medication Sig Dispense Refill    collagen, hydrolysate, bovine, (COLLAGEN, HYDR, BOVINE,, BULK,) 100 % Powd       fish,bora,flax oils-om3,6,9no1 400-400-400 mg Cap Take by mouth.      glucosamine-chondroitin 500-400 mg tablet Take 1 tablet by mouth 3 (three) times daily.      multivitamin (THERAGRAN) per tablet Take 1 tablet by mouth once daily.      PNEUMOVAX-23 25 mcg/0.5 mL vaccine        No current facility-administered medications on file prior to visit.     Review of patient's allergies indicates:  No Known Allergies    Review of Systems   Constitutional: Negative for chills, decreased appetite, fever, malaise/fatigue, night sweats, weight gain and weight loss.   Cardiovascular: Negative for chest pain, claudication, dyspnea on exertion, leg swelling, palpitations and syncope.   Respiratory: Negative for cough and shortness of breath.    Endocrine: Negative for cold intolerance and heat intolerance.   Hematologic/Lymphatic: Negative for bleeding problem. Does not bruise/bleed easily.   Skin: Negative for color change, dry skin, flushing, itching, nail changes, poor wound healing, rash, skin cancer, suspicious lesions and unusual hair distribution.   Musculoskeletal: Negative for arthritis, back pain, falls, gout, joint pain, joint swelling, muscle cramps, muscle weakness, myalgias, neck pain and stiffness.        Foot pain   Gastrointestinal: Positive for heartburn (Occasional). Negative for diarrhea, nausea and vomiting.   Neurological: Negative for dizziness, focal weakness, light-headedness,  numbness, paresthesias, tremors, vertigo and weakness.   Psychiatric/Behavioral: Negative for altered mental status and depression. The patient does not have insomnia.    Allergic/Immunologic: Negative.            Objective:       Vitals:    01/04/22 0929   BP: 127/72   Pulse: (!) 52   Weight: 72.3 kg (159 lb 6.3 oz)   PainSc:   2   72.3 kg (159 lb 6.3 oz)     Physical Exam  Vitals reviewed.   Constitutional:       General: He is not in acute distress.     Appearance: He is well-developed. He is not ill-appearing, toxic-appearing or diaphoretic.      Comments: Proper supportive shoegear   Good feet inserts well worn = 1 pair  Other pair Good feet inserts sturdy   Cardiovascular:      Pulses:           Dorsalis pedis pulses are 2+ on the right side and 2+ on the left side.        Posterior tibial pulses are 2+ on the right side and 2+ on the left side.   Musculoskeletal:         General: No swelling.      Right lower leg: No edema.      Left lower leg: No edema.      Right ankle: Normal.      Right Achilles Tendon: Normal.      Left ankle: Normal.      Left Achilles Tendon: Normal.      Right foot: Decreased range of motion. Deformity, bunion and tenderness present. No bony tenderness.      Left foot: Decreased range of motion. Deformity and bunion present. No tenderness or bony tenderness.      Comments: Elongated 2nd toes no pain on palpation to 2nd digits    Flexible pes planus foot type w/ medial arch collapse and mild gastroc equinus      Reducible extensor and flexor contractures at the MTPJ and PIPJ of toes 2-5, bilat.     No pain to 5th metatarsal base or course of peroneal tendon; no pain to dorsal metatarsals, no pain with side-to-side compression    Mild tenderness just medial to the 5th metatarsal base on the plantar aspect along the intrinsic muscles    No pain with range of motion strength 5/5 bilaterally in all directions against resistance      Feet:      Right foot:      Protective Sensation: 10  sites tested. 10 sites sensed.      Skin integrity: No ulcer, blister, skin breakdown, erythema, warmth, callus or dry skin.      Toenail Condition: Right toenails are normal.      Left foot:      Protective Sensation: 10 sites tested. 10 sites sensed.      Skin integrity: No ulcer, blister, skin breakdown, erythema, warmth, callus or dry skin.      Toenail Condition: Left toenails are normal.      Comments: No erythema or signs of infection no excessive warmth  Skin:     General: Skin is warm.      Capillary Refill: Capillary refill takes 2 to 3 seconds.      Coloration: Skin is not pale.      Findings: No erythema or rash.      Nails: There is no clubbing.   Neurological:      Mental Status: He is alert and oriented to person, place, and time.      Sensory: No sensory deficit.      Gait: Gait is intact.   Psychiatric:         Attention and Perception: Attention normal.         Mood and Affect: Mood normal.         Speech: Speech normal.         Behavior: Behavior normal.         Thought Content: Thought content normal.         Cognition and Memory: Cognition normal.         Judgment: Judgment normal.               Assessment:       Encounter Diagnoses   Name Primary?    Right foot sprain, initial encounter Yes    Metatarsalgia of right foot          Plan:       Hugh was seen today for foot pain.    Diagnoses and all orders for this visit:    Right foot sprain, initial encounter    Metatarsalgia of right foot    Other orders  -     meloxicam (MOBIC) 15 MG tablet; Take 1 tablet (15 mg total) by mouth once daily. for 20 days      I counseled the patient on his conditions, their implications and medical management.      - discussed with patient likely a sprain.  No indication for stress fracture or bony abnormality today.  Recommend treating as such.  Recommend anti-inflammatory.  Patient relates his GERD is well controlled recommend a short course of Mobic    Mobic prescribed. Patient was instructed on dosing  information. Discontinue if adverse effects occur     - evaluated inserts recommend exchanging the 1 pair of Good feet.  Also consider custom-molded orthotics.  Shoes appear appropriate at this time.    - 2nd digit briefly discussed likely arthritis.    - If right foot pain is not resolved in 3-4 weeks recommend x-ray, physical therapy, or other treatment modalities.  No exercise restrictions at this time however if something is painful avoid  that activity          Follow up if symptoms worsen or fail to improve.

## 2022-01-10 ENCOUNTER — HOSPITAL ENCOUNTER (OUTPATIENT)
Dept: RADIOLOGY | Facility: HOSPITAL | Age: 67
Discharge: HOME | End: 2022-01-10
Attending: PHYSICIAN ASSISTANT
Payer: MEDICARE

## 2022-01-10 ENCOUNTER — OFFICE VISIT (OUTPATIENT)
Dept: SPORTS MEDICINE | Facility: CLINIC | Age: 67
End: 2022-01-10
Payer: MEDICARE

## 2022-01-10 VITALS
WEIGHT: 156.75 LBS | BODY MASS INDEX: 22.44 KG/M2 | DIASTOLIC BLOOD PRESSURE: 73 MMHG | HEIGHT: 70 IN | SYSTOLIC BLOOD PRESSURE: 125 MMHG | HEART RATE: 50 BPM

## 2022-01-10 DIAGNOSIS — M65.9 SYNOVITIS: ICD-10-CM

## 2022-01-10 DIAGNOSIS — M25.511 STERNOCLAVICULAR JOINT PAIN, RIGHT: Primary | ICD-10-CM

## 2022-01-10 DIAGNOSIS — M25.511 RIGHT SHOULDER PAIN, UNSPECIFIED CHRONICITY: ICD-10-CM

## 2022-01-10 PROCEDURE — 99204 PR OFFICE/OUTPT VISIT, NEW, LEVL IV, 45-59 MIN: ICD-10-PCS | Mod: HCNC,S$GLB,, | Performed by: PHYSICIAN ASSISTANT

## 2022-01-10 PROCEDURE — 1159F MED LIST DOCD IN RCRD: CPT | Mod: HCNC,CPTII,S$GLB, | Performed by: PHYSICIAN ASSISTANT

## 2022-01-10 PROCEDURE — 1126F AMNT PAIN NOTED NONE PRSNT: CPT | Mod: HCNC,CPTII,S$GLB, | Performed by: PHYSICIAN ASSISTANT

## 2022-01-10 PROCEDURE — 73030 X-RAY EXAM OF SHOULDER: CPT | Mod: 26,HCNC,RT, | Performed by: RADIOLOGY

## 2022-01-10 PROCEDURE — 99999 PR PBB SHADOW E&M-EST. PATIENT-LVL III: ICD-10-PCS | Mod: PBBFAC,HCNC,, | Performed by: PHYSICIAN ASSISTANT

## 2022-01-10 PROCEDURE — 3008F BODY MASS INDEX DOCD: CPT | Mod: HCNC,CPTII,S$GLB, | Performed by: PHYSICIAN ASSISTANT

## 2022-01-10 PROCEDURE — 73030 XR SHOULDER COMPLETE 2 OR MORE VIEWS RIGHT: ICD-10-PCS | Mod: 26,HCNC,RT, | Performed by: RADIOLOGY

## 2022-01-10 PROCEDURE — 99999 PR PBB SHADOW E&M-EST. PATIENT-LVL III: CPT | Mod: PBBFAC,HCNC,, | Performed by: PHYSICIAN ASSISTANT

## 2022-01-10 PROCEDURE — 3074F PR MOST RECENT SYSTOLIC BLOOD PRESSURE < 130 MM HG: ICD-10-PCS | Mod: HCNC,CPTII,S$GLB, | Performed by: PHYSICIAN ASSISTANT

## 2022-01-10 PROCEDURE — 1160F RVW MEDS BY RX/DR IN RCRD: CPT | Mod: HCNC,CPTII,S$GLB, | Performed by: PHYSICIAN ASSISTANT

## 2022-01-10 PROCEDURE — 3008F PR BODY MASS INDEX (BMI) DOCUMENTED: ICD-10-PCS | Mod: HCNC,CPTII,S$GLB, | Performed by: PHYSICIAN ASSISTANT

## 2022-01-10 PROCEDURE — 1160F PR REVIEW ALL MEDS BY PRESCRIBER/CLIN PHARMACIST DOCUMENTED: ICD-10-PCS | Mod: HCNC,CPTII,S$GLB, | Performed by: PHYSICIAN ASSISTANT

## 2022-01-10 PROCEDURE — 3074F SYST BP LT 130 MM HG: CPT | Mod: HCNC,CPTII,S$GLB, | Performed by: PHYSICIAN ASSISTANT

## 2022-01-10 PROCEDURE — 1159F PR MEDICATION LIST DOCUMENTED IN MEDICAL RECORD: ICD-10-PCS | Mod: HCNC,CPTII,S$GLB, | Performed by: PHYSICIAN ASSISTANT

## 2022-01-10 PROCEDURE — 3078F PR MOST RECENT DIASTOLIC BLOOD PRESSURE < 80 MM HG: ICD-10-PCS | Mod: HCNC,CPTII,S$GLB, | Performed by: PHYSICIAN ASSISTANT

## 2022-01-10 PROCEDURE — 1126F PR PAIN SEVERITY QUANTIFIED, NO PAIN PRESENT: ICD-10-PCS | Mod: HCNC,CPTII,S$GLB, | Performed by: PHYSICIAN ASSISTANT

## 2022-01-10 PROCEDURE — 3078F DIAST BP <80 MM HG: CPT | Mod: HCNC,CPTII,S$GLB, | Performed by: PHYSICIAN ASSISTANT

## 2022-01-10 PROCEDURE — 99204 OFFICE O/P NEW MOD 45 MIN: CPT | Mod: HCNC,S$GLB,, | Performed by: PHYSICIAN ASSISTANT

## 2022-01-10 PROCEDURE — 73030 X-RAY EXAM OF SHOULDER: CPT | Mod: TC,HCNC,PN,RT

## 2022-01-10 NOTE — PROGRESS NOTES
CC: RIGHT SC joint swelling    Patient is a 66 y.o. Male who presents for evaluation of right SC joint swelling. Patient reports that he received the Moderna COVID-19 booster on 2021 and two days later began to notice swelling over his right SC joint. He was seen for this by his PCP who ordered basic labs and an ultrasound of the area and referred into Orthopedics.  Labs were within normal limits with no lymphocytosis or signs of acute infection.  Ultrasound revealed a fluid collection measuring 1.5 x 1.4 x 2.6 cm with associated synovial thickening that can be seen in the setting of acute synovitis.  Patient denies any pain or tenderness over this area.  There is no and has not been any redness or drainage from the area.  He denies any changes in size, shape, or character of the area.  Pain does not increase with movement of the right shoulder.  He is an active individual and likes to play pickle ball which he has still been able to do so since this started.  There is no associated numbness or tingling of the right upper extremity.    He reports that the pain and weakness is worse with overhead activity. It also bothers him at night.    Is affecting ADLs.  Pain is 0/10 at it's worst.      Past Medical History:   Diagnosis Date    Basal cell carcinoma     Urinary tract infection        Past Surgical History:   Procedure Laterality Date    COLONOSCOPY N/A 2016    Procedure: COLONOSCOPY;  Surgeon: George Virgen MD;  Location: [facility] ([address] [place] [address]);  Service: Colon and Rectal;  Laterality: N/A;    COLONOSCOPY      FRACTURE SURGERY      broken thumb       Family History   Problem Relation Age of Onset    Colon polyps Mother     Squamous cell carcinoma Mother     Hearing loss Mother         Bad hearing, but will not get hearing aid    Cancer Father          in     Early death Father         was 44 in , WWII POW    No Known Problems Brother     Arthritis Maternal Grandmother      Diabetes Neg Hx     Melanoma Neg Hx          Current Outpatient Medications:     collagen, hydrolysate, bovine, (COLLAGEN, HYDR, BOVINE,, BULK,) 100 % Powd, , Disp: , Rfl:     fish,bora,flax oils-om3,6,9no1 400-400-400 mg Cap, Take by mouth., Disp: , Rfl:     glucosamine-chondroitin 500-400 mg tablet, Take 1 tablet by mouth 3 (three) times daily., Disp: , Rfl:     meloxicam (MOBIC) 15 MG tablet, Take 1 tablet (15 mg total) by mouth once daily. for 20 days, Disp: 20 tablet, Rfl: 0    multivitamin (THERAGRAN) per tablet, Take 1 tablet by mouth once daily., Disp: , Rfl:     PNEUMOVAX-23 25 mcg/0.5 mL vaccine, , Disp: , Rfl:     Review of patient's allergies indicates:  No Known Allergies       REVIEW OF SYSTEMS:  Constitution: Negative. Negative for chills, fever and night sweats.   HENT: Negative for congestion and headaches.    Eyes: Negative for blurred vision, left vision loss and right vision loss.   Cardiovascular: Negative for chest pain and syncope.   Respiratory: Negative for cough and shortness of breath.    Endocrine: Negative for polydipsia, polyphagia and polyuria.   Hematologic/Lymphatic: Negative for bleeding problem. Does not bruise/bleed easily.   Skin: Negative for dry skin, itching and rash.   Musculoskeletal: Negative for falls.  Positive for right shoulder pain and muscle weakness.   Gastrointestinal: Negative for abdominal pain and bowel incontinence.   Genitourinary: Negative for bladder incontinence and nocturia.   Neurological: Negative for disturbances in coordination, loss of balance and seizures.   Psychiatric/Behavioral: Negative for depression. The patient does not have insomnia.    Allergic/Immunologic: Negative for hives and persistent infections.      PHYSICAL EXAMINATION:  Vitals:  There were no vitals taken for this visit.   General: The patient is alert and oriented x 3.  Mood is pleasant.  Observation of ears, eyes and nose reveal no gross abnormalities.  No labored breathing  observed.  Gait is coordinated. Patient can toe walk and heel walk without difficulty.      RIGHT SHOULDER / UPPER EXTREMITY EXAM    OBSERVATION:     Swelling  + right SC joint Deformity  none   Discoloration  none    Scapular winging none   Scars   none   Atrophy  none    TENDERNESS / CREPITUS (T/C):          T/C      T/C   Clavicle   -/-  SUPRAspinatus    -/-     AC Jt.    -/-  INFRAspinatus  -/-    SC Jt.    -/- + swelling Deltoid    -/-      G. Tuberosity  -/-  LH BICEP groove  -/-   Acromion:  -/-  Midline Neck   -/-     Scapular Spine -/-  Trapezium   -/-   SMA Scapula  -/-  GH jt. line - post  -/-     Scapulothoracic  -/-         ROM: (* = with pain)  Left shoulder   Right shoulder        AROM (PROM)   AROM (PROM)   FE    170° (175°)     170° (175°)     ER at 0°    60°  (65°)    60°  (65°)   ER at 90° ABD  90°  (90°)    90°  (90°)   IR at 90°  ABD   NA  (40°)     NA  (40°)      IR (spine level)   T10     T10    STRENGTH: (* = with pain) Left shoulder   Right shoulder   SCAPTION   5/5    5/5    IR    5/5    5/5   ER    5/5    5/5   BICEPS   5/5    5/5   Deltoid    5/5    5/5     SIGNS:  Painful side       NEER   -    OKADENS  neg    ZIMMERMAN   -    SPEEDS  neg     DROP ARM   -   BELLY PRESS neg   Superior escape none    LIFT-OFF  neg   X-Body ADD    neg    MOVING VALGUS neg        STABILITY TESTING    Left shoulder   Right shoulder    Translation     Anterior  up face     up face    Posterior  up face    up face    Sulcus   < 10mm    < 10 mm     Signs   Apprehension   neg      neg       Relocation   no change     no change      Jerk test  neg     neg    EXTREMITY NEURO-VASCULAR EXAM:    Sensation grossly intact to light touch all dermatomal regions.    DTR 2+ Biceps, Triceps, BR and Negative Luis Albertos sign   Grossly intact motor function at Elbow, Wrist and Hand   Distal pulses radial and ulnar 2+, brisk cap refill, symmetric.      NECK:  Painless FROM and spinous processes non-tender. Negative  Spurlings sign.      OTHER FINDINGS:  - scapular dyskinesia  Approximate 1 x 2 cm soft, cystic-like fluid collection at the level of the right SC joint.  There is no warmth, erythema, or drainage present.    XRAYS right shoulder (1/10/2022):  FINDINGS:  No acute fracture or dislocation seen.  No soft tissue edema or radiopaque retained foreign body.       US chest/mediastinum (12/29/2022):    FINDINGS:  Fluid collection at the level of the sternoclavicular joint measures 1.5 x 1.4 x 2.6 cm.  Associated synovial thickening with mild increased vascularity.  Findings consistent with synovitis which may be inflammatory or infectious in nature.  Please correlate with exam and laboratory values.  Needle aspiration may be considered in the appropriate setting.          ASSESSMENT:     Right SC joint swelling      PLAN:      1. I made the decision to obtain old records of the patient including previous notes and imaging. New imaging was ordered today of the extremity or extremities evaluated. I independently reviewed and interpreted the radiographs and/or MRIs today as well as prior imaging, if available.    2. I discussed with patient possible etiologies of right SC joint swelling and possible interventions including conservative treatment such as watchful waiting, icing, and rest.  Also discussed with patient possible ultrasound-guided aspiration of the area which would be set up to be done by 1 of the primary care sports medicine physicians.  I will discuss with my colleagues to see if they would be willing to perform this in office procedure for him.  However, patient would rather keep an eye on this and watch it before proceeding with ultrasound-guided aspiration.  Advised patient to watch for changes in size, shape, or consistency of this area.  If he begins to notice any warmth, erythema, drainage, or pain over the area he should reach out to us for further evaluation for this, however I feel it is reasonable to  keep an eye on this at this time.    3. Follow-up as needed or with 1 of the primary care sports medicine physicians for ultrasound-guided aspiration if patient would like to proceed.    All questions were answered, patient will contact us for questions or concerns in the interim.      Medical Dictation software was used during the dictation of portions or the entirety of this medical record.  Phonetic or grammatic errors may exist due to the use of this software. For clarification, refer to the author of the document.

## 2022-06-14 ENCOUNTER — OFFICE VISIT (OUTPATIENT)
Dept: DERMATOLOGY | Facility: CLINIC | Age: 67
End: 2022-06-14
Payer: MEDICARE

## 2022-06-14 DIAGNOSIS — Z12.83 SCREENING EXAM FOR SKIN CANCER: ICD-10-CM

## 2022-06-14 DIAGNOSIS — L82.1 SEBORRHEIC KERATOSES: ICD-10-CM

## 2022-06-14 DIAGNOSIS — D22.5 MELANOCYTIC NEVUS OF TRUNK: ICD-10-CM

## 2022-06-14 DIAGNOSIS — L57.0 ACTINIC KERATOSIS: Primary | ICD-10-CM

## 2022-06-14 DIAGNOSIS — L81.4 LENTIGINES: ICD-10-CM

## 2022-06-14 DIAGNOSIS — Z85.828 HISTORY OF NONMELANOMA SKIN CANCER: ICD-10-CM

## 2022-06-14 PROCEDURE — 17000 DESTRUCT PREMALG LESION: CPT | Mod: S$GLB,,, | Performed by: DERMATOLOGY

## 2022-06-14 PROCEDURE — 3288F FALL RISK ASSESSMENT DOCD: CPT | Mod: CPTII,S$GLB,, | Performed by: DERMATOLOGY

## 2022-06-14 PROCEDURE — 1160F RVW MEDS BY RX/DR IN RCRD: CPT | Mod: CPTII,S$GLB,, | Performed by: DERMATOLOGY

## 2022-06-14 PROCEDURE — 1126F AMNT PAIN NOTED NONE PRSNT: CPT | Mod: CPTII,S$GLB,, | Performed by: DERMATOLOGY

## 2022-06-14 PROCEDURE — 1101F PT FALLS ASSESS-DOCD LE1/YR: CPT | Mod: CPTII,S$GLB,, | Performed by: DERMATOLOGY

## 2022-06-14 PROCEDURE — 1126F PR PAIN SEVERITY QUANTIFIED, NO PAIN PRESENT: ICD-10-PCS | Mod: CPTII,S$GLB,, | Performed by: DERMATOLOGY

## 2022-06-14 PROCEDURE — 17003 DESTRUCTION, PREMALIGNANT LESIONS; SECOND THROUGH 14 LESIONS: ICD-10-PCS | Mod: S$GLB,,, | Performed by: DERMATOLOGY

## 2022-06-14 PROCEDURE — 1159F MED LIST DOCD IN RCRD: CPT | Mod: CPTII,S$GLB,, | Performed by: DERMATOLOGY

## 2022-06-14 PROCEDURE — 99213 OFFICE O/P EST LOW 20 MIN: CPT | Mod: 25,S$GLB,, | Performed by: DERMATOLOGY

## 2022-06-14 PROCEDURE — 1101F PR PT FALLS ASSESS DOC 0-1 FALLS W/OUT INJ PAST YR: ICD-10-PCS | Mod: CPTII,S$GLB,, | Performed by: DERMATOLOGY

## 2022-06-14 PROCEDURE — 99213 PR OFFICE/OUTPT VISIT, EST, LEVL III, 20-29 MIN: ICD-10-PCS | Mod: 25,S$GLB,, | Performed by: DERMATOLOGY

## 2022-06-14 PROCEDURE — 1160F PR REVIEW ALL MEDS BY PRESCRIBER/CLIN PHARMACIST DOCUMENTED: ICD-10-PCS | Mod: CPTII,S$GLB,, | Performed by: DERMATOLOGY

## 2022-06-14 PROCEDURE — 1159F PR MEDICATION LIST DOCUMENTED IN MEDICAL RECORD: ICD-10-PCS | Mod: CPTII,S$GLB,, | Performed by: DERMATOLOGY

## 2022-06-14 PROCEDURE — 17003 DESTRUCT PREMALG LES 2-14: CPT | Mod: S$GLB,,, | Performed by: DERMATOLOGY

## 2022-06-14 PROCEDURE — 17000 PR DESTRUCTION(LASER SURGERY,CRYOSURGERY,CHEMOSURGERY),PREMALIGNANT LESIONS,FIRST LESION: ICD-10-PCS | Mod: S$GLB,,, | Performed by: DERMATOLOGY

## 2022-06-14 PROCEDURE — 3288F PR FALLS RISK ASSESSMENT DOCUMENTED: ICD-10-PCS | Mod: CPTII,S$GLB,, | Performed by: DERMATOLOGY

## 2022-06-14 NOTE — PROGRESS NOTES
Patient Information  Name: Hugh Bates  : 1955  MRN: [id number]     Referring Physician:  No ref. provider found   Primary Care Physician:  Joseph Santana MD   Date of Visit: 2022      Subjective:     History of Present lllness:    Hugh Bates is a 67 y.o. male who presents with a chief complaint of mole.   This is a high risk patient with a personal history of nonmelanoma skin cancer who is here today to check for the development of new lesions.   Denies any new, changing, or symptomatic lesions on the skin.    Patient was last seen: 2021.  Prior notes by myself reviewed.   Clinical documentation obtained by nursing staff reviewed.    Review of Systems   Skin: Positive for daily sunscreen use, activity-related sunscreen use and wears hat. Negative for itching and rash.   Hematologic/Lymphatic: Does not bruise/bleed easily.       Objective:   Physical Exam   Constitutional: He appears well-developed and well-nourished. No distress.   HENT:   Mouth/Throat: Lips normal.    Eyes: Lids are normal.  No conjunctival no injection.   Neurological: He is alert and oriented to person, place, and time. He is not disoriented.   Psychiatric: He has a normal mood and affect.   Skin:   Areas Examined (abnormalities noted in diagram):   Scalp / Hair Palpated and Inspected  Head / Face Inspection Performed  Neck Inspection Performed  Chest / Axilla Inspection Performed  Abdomen Inspection Performed  Genitals / Buttocks / Groin Inspection Performed  Back Inspection Performed  RUE Inspected  LUE Inspection Performed  RLE Inspected  LLE Inspection Performed  Nails and Digits Inspection Performed                 Diagram Legend     Erythematous scaling macule/papule c/w actinic keratosis       Vascular papule c/w angioma      Pigmented verrucoid papule/plaque c/w seborrheic keratosis      Yellow umbilicated papule c/w sebaceous hyperplasia      Irregularly shaped tan macule c/w lentigo     1-2 mm  smooth white papules consistent with Milia      Movable subcutaneous cyst with punctum c/w epidermal inclusion cyst      Subcutaneous movable cyst c/w pilar cyst      Firm pink to brown papule c/w dermatofibroma      Pedunculated fleshy papule(s) c/w skin tag(s)      Evenly pigmented macule c/w junctional nevus     Mildly variegated pigmented, slightly irregular-bordered macule c/w mildly atypical nevus      Flesh colored to evenly pigmented papule c/w intradermal nevus       Pink pearly papule/plaque c/w basal cell carcinoma      Erythematous hyperkeratotic cursted plaque c/w SCC      Surgical scar with no sign of skin cancer recurrence      Open and closed comedones      Inflammatory papules and pustules      Verrucoid papule consistent consistent with wart     Erythematous eczematous patches and plaques     Dystrophic onycholytic nail with subungual debris c/w onychomycosis     Umbilicated papule    Erythematous-base heme-crusted tan verrucoid plaque consistent with inflamed seborrheic keratosis     Erythematous Silvery Scaling Plaque c/w Psoriasis     See annotation    No images are attached to the encounter or orders placed in the encounter.      [] Data reviewed  [] Prior external notes reviewed  [] Independent review of test  [] Management discussed with another provider  [] Independent historian    Assessment / Plan:        Actinic keratosis  Cryosurgery procedure note:  Risk, benefits, and alternatives of cryosurgery are discussed with the patient, including but not limited to the risks of hypopigmentation, hyperpigmentation, scar, infection, recurrence of lesion(s), development of new lesion(s), and need for additional treatment of the lesion(s). Verbal consent obtained from patient. Liquid nitrogen cryosurgery applied to 3 lesion(s) to produce a freeze injury. Counseled patient that blisters may form, and instructed patient on wound care with gentle cleansing and use of Vaseline ointment to keep moist  until healed. Handout was provided, and patient was instructed to return to clinic in 1-2 months if lesions do not completely resolve.    Melanocytic nevus of trunk  Multiple benign-appearing nevi present on exam today. Reassurance provided. Counseled patient to periodically examine moles and return to clinic if any changes in size, shape, or color are noted or if it becomes symptomatic (bleeding, itching, pain, etc).  Recommend using a broad-spectrum, water-resistant sunscreen with SPF of 30 or higher--reapply every 2 hours. Seek shade, wear sun-protective clothing, and perform regular skin self-exams.    Lentigines  These are benign sun spots which should be monitored for changes. Daily sun protection will reduce the number of new lesions.   Recommend using a broad-spectrum, water-resistant sunscreen with SPF of 30 or higher--reapply every 2 hours. Seek shade, wear sun-protective clothing, and perform regular skin self-exams.    Seborrheic keratoses  These are benign, inherited growths without a malignant potential. Reassurance given to patient. No treatment is necessary.    History of nonmelanoma skin cancer   - stable and chronic  Area(s) of previous nonmelanoma skin cancer evaluated with no evidence of recurrence. Reassurance provided.  Recommend using a broad-spectrum, water-resistant sunscreen with SPF of 30 or higher--reapply every 2 hours. Seek shade, wear sun-protective clothing, and perform regular skin self-exams.    Screening exam for skin cancer  Total body skin examination performed today as noted in physical exam. No lesions suspicious for malignancy were seen.  Recommend using a broad-spectrum, water-resistant sunscreen with SPF of 30 or higher--reapply every 2 hours. Seek shade, wear sun-protective clothing, and perform regular skin self-exams.    Follow up in about 1 year (around 6/14/2023) for TBSE, or sooner if any new problems or changing lesions.      Marcela Francis MD, FAAD [facility]

## 2022-06-24 ENCOUNTER — PATIENT MESSAGE (OUTPATIENT)
Dept: INTERNAL MEDICINE | Facility: CLINIC | Age: 67
End: 2022-06-24
Payer: MEDICARE

## 2022-06-24 DIAGNOSIS — M54.30 SCIATICA, UNSPECIFIED LATERALITY: Primary | ICD-10-CM

## 2022-07-11 ENCOUNTER — TELEPHONE (OUTPATIENT)
Dept: SPINE | Facility: CLINIC | Age: 67
End: 2022-07-11
Payer: MEDICARE

## 2022-07-11 NOTE — TELEPHONE ENCOUNTER
This message is for patient in regards to his or hers appointment 07/12/22 at 9:20 am with Dayami Barrios Np.    On the 4th floor [address] in the back and spine center. We do ask that patients arrive 15 minutes before appointment time.  Patient may contact [phone] if there is any questions or concerns. [facility].staff called all 3 numbers and still wasn't able to reach pt or leave message.

## 2022-07-12 ENCOUNTER — HOSPITAL ENCOUNTER (OUTPATIENT)
Dept: RADIOLOGY | Facility: OTHER | Age: 67
Discharge: HOME | End: 2022-07-12
Attending: NURSE PRACTITIONER
Payer: MEDICARE

## 2022-07-12 ENCOUNTER — OFFICE VISIT (OUTPATIENT)
Dept: SPINE | Facility: CLINIC | Age: 67
End: 2022-07-12
Payer: MEDICARE

## 2022-07-12 VITALS
WEIGHT: 156.5 LBS | HEIGHT: 70 IN | SYSTOLIC BLOOD PRESSURE: 111 MMHG | HEART RATE: 52 BPM | BODY MASS INDEX: 22.41 KG/M2 | TEMPERATURE: 98 F | RESPIRATION RATE: 18 BRPM | DIASTOLIC BLOOD PRESSURE: 65 MMHG

## 2022-07-12 DIAGNOSIS — M47.26 OSTEOARTHRITIS OF SPINE WITH RADICULOPATHY, LUMBAR REGION: ICD-10-CM

## 2022-07-12 DIAGNOSIS — M54.9 DORSALGIA, UNSPECIFIED: ICD-10-CM

## 2022-07-12 DIAGNOSIS — M51.36 DDD (DEGENERATIVE DISC DISEASE), LUMBAR: Primary | ICD-10-CM

## 2022-07-12 DIAGNOSIS — M51.36 DDD (DEGENERATIVE DISC DISEASE), LUMBAR: ICD-10-CM

## 2022-07-12 PROCEDURE — 99999 PR PBB SHADOW E&M-EST. PATIENT-LVL III: CPT | Mod: PBBFAC,,, | Performed by: NURSE PRACTITIONER

## 2022-07-12 PROCEDURE — 72114 X-RAY EXAM L-S SPINE BENDING: CPT | Mod: TC,FY

## 2022-07-12 PROCEDURE — 3288F FALL RISK ASSESSMENT DOCD: CPT | Mod: CPTII,S$GLB,, | Performed by: NURSE PRACTITIONER

## 2022-07-12 PROCEDURE — 99999 PR PBB SHADOW E&M-EST. PATIENT-LVL III: ICD-10-PCS | Mod: PBBFAC,,, | Performed by: NURSE PRACTITIONER

## 2022-07-12 PROCEDURE — 1159F MED LIST DOCD IN RCRD: CPT | Mod: CPTII,S$GLB,, | Performed by: NURSE PRACTITIONER

## 2022-07-12 PROCEDURE — 99204 PR OFFICE/OUTPT VISIT, NEW, LEVL IV, 45-59 MIN: ICD-10-PCS | Mod: S$GLB,,, | Performed by: NURSE PRACTITIONER

## 2022-07-12 PROCEDURE — 3008F BODY MASS INDEX DOCD: CPT | Mod: CPTII,S$GLB,, | Performed by: NURSE PRACTITIONER

## 2022-07-12 PROCEDURE — 1125F AMNT PAIN NOTED PAIN PRSNT: CPT | Mod: CPTII,S$GLB,, | Performed by: NURSE PRACTITIONER

## 2022-07-12 PROCEDURE — 3078F PR MOST RECENT DIASTOLIC BLOOD PRESSURE < 80 MM HG: ICD-10-PCS | Mod: CPTII,S$GLB,, | Performed by: NURSE PRACTITIONER

## 2022-07-12 PROCEDURE — 3074F SYST BP LT 130 MM HG: CPT | Mod: CPTII,S$GLB,, | Performed by: NURSE PRACTITIONER

## 2022-07-12 PROCEDURE — 3288F PR FALLS RISK ASSESSMENT DOCUMENTED: ICD-10-PCS | Mod: CPTII,S$GLB,, | Performed by: NURSE PRACTITIONER

## 2022-07-12 PROCEDURE — 1160F PR REVIEW ALL MEDS BY PRESCRIBER/CLIN PHARMACIST DOCUMENTED: ICD-10-PCS | Mod: CPTII,S$GLB,, | Performed by: NURSE PRACTITIONER

## 2022-07-12 PROCEDURE — 1101F PR PT FALLS ASSESS DOC 0-1 FALLS W/OUT INJ PAST YR: ICD-10-PCS | Mod: CPTII,S$GLB,, | Performed by: NURSE PRACTITIONER

## 2022-07-12 PROCEDURE — 1160F RVW MEDS BY RX/DR IN RCRD: CPT | Mod: CPTII,S$GLB,, | Performed by: NURSE PRACTITIONER

## 2022-07-12 PROCEDURE — 3078F DIAST BP <80 MM HG: CPT | Mod: CPTII,S$GLB,, | Performed by: NURSE PRACTITIONER

## 2022-07-12 PROCEDURE — 3008F PR BODY MASS INDEX (BMI) DOCUMENTED: ICD-10-PCS | Mod: CPTII,S$GLB,, | Performed by: NURSE PRACTITIONER

## 2022-07-12 PROCEDURE — 72114 X-RAY EXAM L-S SPINE BENDING: CPT | Mod: 26,,, | Performed by: RADIOLOGY

## 2022-07-12 PROCEDURE — 1159F PR MEDICATION LIST DOCUMENTED IN MEDICAL RECORD: ICD-10-PCS | Mod: CPTII,S$GLB,, | Performed by: NURSE PRACTITIONER

## 2022-07-12 PROCEDURE — 99204 OFFICE O/P NEW MOD 45 MIN: CPT | Mod: S$GLB,,, | Performed by: NURSE PRACTITIONER

## 2022-07-12 PROCEDURE — 1101F PT FALLS ASSESS-DOCD LE1/YR: CPT | Mod: CPTII,S$GLB,, | Performed by: NURSE PRACTITIONER

## 2022-07-12 PROCEDURE — 72114 XR LUMBAR SPINE 5 VIEW WITH FLEX AND EXT: ICD-10-PCS | Mod: 26,,, | Performed by: RADIOLOGY

## 2022-07-12 PROCEDURE — 1125F PR PAIN SEVERITY QUANTIFIED, PAIN PRESENT: ICD-10-PCS | Mod: CPTII,S$GLB,, | Performed by: NURSE PRACTITIONER

## 2022-07-12 PROCEDURE — 3074F PR MOST RECENT SYSTOLIC BLOOD PRESSURE < 130 MM HG: ICD-10-PCS | Mod: CPTII,S$GLB,, | Performed by: NURSE PRACTITIONER

## 2022-07-12 NOTE — PROGRESS NOTES
Subjective:      Patient ID: Hugh Bates is a 67 y.o. male.    Chief Complaint: Leg Pain (right) and Back Pain    HPI Mr. Bates is a 67 year old male here for evaluation of back and leg pain.     C/o intermittent back pain for years, occasional right leg pain  2022 experienced right calf tightness and numbness to right foot, notes pain down the leg  Chiropractic care has been helping, pain gradually improving  No PT or injections, does stay active, goes to gym  Takes ibuprofen when needed does not like to take much medication    Past Medical History:   Diagnosis Date    Basal cell carcinoma     Urinary tract infection        Past Surgical History:   Procedure Laterality Date    COLONOSCOPY N/A 2016    Procedure: COLONOSCOPY;  Surgeon: George Virgen MD;  Location: [facility] ([address] [place] [address]);  Service: Colon and Rectal;  Laterality: N/A;    COLONOSCOPY      FRACTURE SURGERY      broken thumb       Family History   Problem Relation Age of Onset    Colon polyps Mother     Squamous cell carcinoma Mother     Hearing loss Mother         Bad hearing, but will not get hearing aid    Cancer Father          in     Early death Father         was 44 in , WWII POW    No Known Problems Brother     Arthritis Maternal Grandmother     Diabetes Neg Hx     Melanoma Neg Hx        Social History     Socioeconomic History    Marital status:    Occupational History    Occupation: Collections   Tobacco Use    Smoking status: Never Smoker    Smokeless tobacco: Never Used   Substance and Sexual Activity    Alcohol use: Yes     Alcohol/week: 7.0 standard drinks     Types: 1 Glasses of wine, 6 Cans of beer per week    Drug use: Never    Sexual activity: Yes     Partners: Female     Birth control/protection: None   Social History Narrative    Lives w/wife.  3 adult children.       Social Determinants of Health     Financial Resource Strain: Low Risk     Difficulty of Paying Living  Expenses: Not hard at all   Food Insecurity: No Food Insecurity    Worried About Running Out of Food in the Last Year: Never true    Ran Out of Food in the Last Year: Never true   Transportation Needs: No Transportation Needs    Lack of Transportation (Medical): No    Lack of Transportation (Non-Medical): No   Physical Activity: Sufficiently Active    Days of Exercise per Week: 6 days    Minutes of Exercise per Session: 50 min   Stress: No Stress Concern Present    Feeling of Stress : Not at all   Social Connections: Unknown    Frequency of Communication with Friends and Family: Three times a week    Frequency of Social Gatherings with Friends and Family: Twice a week    Active Member of Clubs or Organizations: No    Attends Club or Organization Meetings: Patient refused    Marital Status:    Housing Stability: Low Risk     Unable to Pay for Housing in the Last Year: No    Number of Places Lived in the Last Year: 1    Unstable Housing in the Last Year: No       Current Outpatient Medications   Medication Sig Dispense Refill    collagen, hydrolysate, bovine, (COLLAGEN, HYDR, BOVINE,, BULK,) 100 % Powd       fish,bora,flax oils-om3,6,9no1 400-400-400 mg Cap Take by mouth.      glucosamine-chondroitin 500-400 mg tablet Take 1 tablet by mouth 3 (three) times daily.      multivitamin (THERAGRAN) per tablet Take 1 tablet by mouth once daily.      PNEUMOVAX-23 25 mcg/0.5 mL vaccine        No current facility-administered medications for this visit.       Review of patient's allergies indicates:  No Known Allergies      Review of Systems   Constitutional: Negative for fever, weight gain and weight loss.   Cardiovascular: Negative for chest pain.   Respiratory: Negative for shortness of breath.    Musculoskeletal: Positive for muscle cramps. Negative for back pain.        Right leg pain   Gastrointestinal: Positive for nausea. Negative for abdominal pain, bowel incontinence and vomiting.    Genitourinary: Negative for bladder incontinence.   Neurological: Positive for numbness (  right foot). Negative for focal weakness, paresthesias, sensory change and weakness.         Objective:        General: Hugh is well-developed, well-nourished, appears stated age, in no acute distress, alert and oriented to time, place and person.     General    Vitals reviewed.  Constitutional: He is oriented to person, place, and time. He appears well-developed and well-nourished.   HENT:   Head: Atraumatic.   Nose: Nose normal.   Eyes: Conjunctivae are normal.   Pulmonary/Chest: Effort normal.   Abdominal: He exhibits no distension.   Neurological: He is alert and oriented to person, place, and time.   Psychiatric: He has a normal mood and affect. His behavior is normal. Judgment and thought content normal.     General Musculoskeletal Exam   Gait: normal     Back (L-Spine & T-Spine) / Neck (C-Spine) Exam     Back (L-Spine & T-Spine) Range of Motion   Extension: 20   Flexion: 80   Lateral bend right: 20   Lateral bend left: 20     Spinal Sensation   Right Side Sensation  L-Spine Level: normal  S-Spine Level: normal  Left Side Sensation  L-Spine Level: normal  S-Spine Level: normal    Back (L-Spine & T-Spine) Tests   Right Side Tests  Straight leg raise:      Sitting SLR: > 70 degrees      Left Side Tests  Straight leg raise:     Sitting SLR: > 70 degrees          Other He has no scoliosis .  Spinal Kyphosis:  Absent    Comments:  (+) SLR on the right      Muscle Strength   Right Upper Extremity   Biceps: 5/5   Deltoid:  5/5  Triceps:  5/5  Left Upper Extremity  Biceps: 5/5   Deltoid:  5/5  Triceps:  5/5  Right Lower Extremity   Hip Flexion: 5/5   Quadriceps:  5/5   Ankle Dorsiflexion:  5/5   Anterior tibial:  5/5   EHL:  5/5  Left Lower Extremity   Hip Flexion: 5/5   Quadriceps:  5/5   Ankle Dorsiflexion:  5/5   Anterior tibial:  5/5   EHL:  5/5    Reflexes     Left Side  Biceps:  2+  Brachioradialis:  2+  Achilles:   2+  Ankle Clonus:  absent    Right Side   Biceps:  2+  Brachioradialis:  2+  Achilles:  2+  Ankle Clonus:  absent    Vascular Exam     Right Pulses        Carotid:                  2+    Left Pulses        Carotid:                  2+              Assessment:       1. DDD (degenerative disc disease), lumbar    2. Dorsalgia, unspecified    3. Osteoarthritis of spine with radiculopathy, lumbar region           Plan:          1. Prior records were reviewed today  2. We discussed back and leg pain and the nature of back and leg pain.  We discussed that it is not one thing that causes the pain but an accumulation of multiple things that we do.  Much of his pain appears to be the result of DDD and facet arthropathy causing nerve irritation  3. Order lumbar xray  4. Discussed referral to healthy back, he is active, but would likely benefit from HB. Currently seeing chiropractor and pain is improving so he would like to hold off for now.   5. We discussed posture sitting and the importance of trying to sit better.    6. We discussed the benefits of therapy and exercise and continuing to move.  7. Discussed possible MRI and injections if pain persists in the future  8. RTC for followup as needed    More than 50% of the total time  of 45 minutes was spent face to face in counseling on diagnosis and treatment options. I also counseled patient  on common and most usual side effect of prescribed medications.  I reviewed Primary care , and other specialty's notes to better coordinate patient's care. All questions were answered, and patient voiced understanding.     Follow-up: Follow up if symptoms worsen or fail to improve. If there are any questions prior to this, the patient was instructed to contact the office.

## 2022-07-18 ENCOUNTER — PATIENT MESSAGE (OUTPATIENT)
Dept: INTERNAL MEDICINE | Facility: CLINIC | Age: 67
End: 2022-07-18
Payer: MEDICARE

## 2022-11-26 ENCOUNTER — PATIENT MESSAGE (OUTPATIENT)
Dept: INTERNAL MEDICINE | Facility: CLINIC | Age: 67
End: 2022-11-26
Payer: MEDICARE

## 2022-11-26 DIAGNOSIS — E87.5 HYPERKALEMIA: ICD-10-CM

## 2022-11-26 DIAGNOSIS — D64.9 ANEMIA, UNSPECIFIED TYPE: ICD-10-CM

## 2022-11-26 DIAGNOSIS — R79.9 ABNORMAL FINDING OF BLOOD CHEMISTRY, UNSPECIFIED: ICD-10-CM

## 2022-11-26 DIAGNOSIS — Z12.5 PROSTATE CANCER SCREENING: Primary | ICD-10-CM

## 2022-11-30 ENCOUNTER — PATIENT MESSAGE (OUTPATIENT)
Dept: INTERNAL MEDICINE | Facility: CLINIC | Age: 67
End: 2022-11-30
Payer: MEDICARE

## 2022-12-05 ENCOUNTER — LAB VISIT (OUTPATIENT)
Dept: LAB | Facility: HOSPITAL | Age: 67
End: 2022-12-05
Attending: INTERNAL MEDICINE
Payer: MEDICARE

## 2022-12-05 DIAGNOSIS — E87.5 HYPERKALEMIA: ICD-10-CM

## 2022-12-05 DIAGNOSIS — Z12.5 PROSTATE CANCER SCREENING: ICD-10-CM

## 2022-12-05 DIAGNOSIS — R79.9 ABNORMAL FINDING OF BLOOD CHEMISTRY, UNSPECIFIED: ICD-10-CM

## 2022-12-05 DIAGNOSIS — D64.9 ANEMIA, UNSPECIFIED TYPE: ICD-10-CM

## 2022-12-05 LAB
ALBUMIN SERPL BCP-MCNC: 3.7 G/DL (ref 3.5–5.2)
ALP SERPL-CCNC: 51 U/L (ref 55–135)
ALT SERPL W/O P-5'-P-CCNC: 27 U/L (ref 10–44)
ANION GAP SERPL CALC-SCNC: 7 MMOL/L (ref 8–16)
AST SERPL-CCNC: 31 U/L (ref 10–40)
BASOPHILS # BLD AUTO: 0.03 K/UL (ref 0–0.2)
BASOPHILS NFR BLD: 1 % (ref 0–1.9)
BILIRUB SERPL-MCNC: 0.8 MG/DL (ref 0.1–1)
BUN SERPL-MCNC: 20 MG/DL (ref 8–23)
CALCIUM SERPL-MCNC: 9.1 MG/DL (ref 8.7–10.5)
CHLORIDE SERPL-SCNC: 104 MMOL/L (ref 95–110)
CHOLEST SERPL-MCNC: 259 MG/DL (ref 120–199)
CHOLEST/HDLC SERPL: 2.7 {RATIO} (ref 2–5)
CO2 SERPL-SCNC: 27 MMOL/L (ref 23–29)
COMPLEXED PSA SERPL-MCNC: 0.79 NG/ML (ref 0–4)
CREAT SERPL-MCNC: 0.9 MG/DL (ref 0.5–1.4)
DIFFERENTIAL METHOD: ABNORMAL
EOSINOPHIL # BLD AUTO: 0.1 K/UL (ref 0–0.5)
EOSINOPHIL NFR BLD: 2.4 % (ref 0–8)
ERYTHROCYTE [DISTWIDTH] IN BLOOD BY AUTOMATED COUNT: 12.8 % (ref 11.5–14.5)
EST. GFR  (NO RACE VARIABLE): >60 ML/MIN/1.73 M^2
ESTIMATED AVG GLUCOSE: 117 MG/DL (ref 68–131)
GLUCOSE SERPL-MCNC: 91 MG/DL (ref 70–110)
HBA1C MFR BLD: 5.7 % (ref 4–5.6)
HCT VFR BLD AUTO: 42.7 % (ref 40–54)
HDLC SERPL-MCNC: 95 MG/DL (ref 40–75)
HDLC SERPL: 36.7 % (ref 20–50)
HGB BLD-MCNC: 13.9 G/DL (ref 14–18)
IMM GRANULOCYTES # BLD AUTO: 0.01 K/UL (ref 0–0.04)
IMM GRANULOCYTES NFR BLD AUTO: 0.3 % (ref 0–0.5)
LDLC SERPL CALC-MCNC: 154.4 MG/DL (ref 63–159)
LYMPHOCYTES # BLD AUTO: 0.9 K/UL (ref 1–4.8)
LYMPHOCYTES NFR BLD: 31.1 % (ref 18–48)
MCH RBC QN AUTO: 31 PG (ref 27–31)
MCHC RBC AUTO-ENTMCNC: 32.6 G/DL (ref 32–36)
MCV RBC AUTO: 95 FL (ref 82–98)
MONOCYTES # BLD AUTO: 0.3 K/UL (ref 0.3–1)
MONOCYTES NFR BLD: 11.9 % (ref 4–15)
NEUTROPHILS # BLD AUTO: 1.5 K/UL (ref 1.8–7.7)
NEUTROPHILS NFR BLD: 53.3 % (ref 38–73)
NONHDLC SERPL-MCNC: 164 MG/DL
NRBC BLD-RTO: 0 /100 WBC
PLATELET # BLD AUTO: 236 K/UL (ref 150–450)
PMV BLD AUTO: 10.8 FL (ref 9.2–12.9)
POTASSIUM SERPL-SCNC: 4.8 MMOL/L (ref 3.5–5.1)
PROT SERPL-MCNC: 6.4 G/DL (ref 6–8.4)
RBC # BLD AUTO: 4.49 M/UL (ref 4.6–6.2)
SODIUM SERPL-SCNC: 138 MMOL/L (ref 136–145)
TRIGL SERPL-MCNC: 48 MG/DL (ref 30–150)
TSH SERPL DL<=0.005 MIU/L-ACNC: 1.4 UIU/ML (ref 0.4–4)
WBC # BLD AUTO: 2.86 K/UL (ref 3.9–12.7)

## 2022-12-05 PROCEDURE — 84153 ASSAY OF PSA TOTAL: CPT | Mod: GA | Performed by: INTERNAL MEDICINE

## 2022-12-05 PROCEDURE — 83036 HEMOGLOBIN GLYCOSYLATED A1C: CPT | Performed by: INTERNAL MEDICINE

## 2022-12-05 PROCEDURE — 85025 COMPLETE CBC W/AUTO DIFF WBC: CPT | Performed by: INTERNAL MEDICINE

## 2022-12-05 PROCEDURE — 80053 COMPREHEN METABOLIC PANEL: CPT | Performed by: INTERNAL MEDICINE

## 2022-12-05 PROCEDURE — 84443 ASSAY THYROID STIM HORMONE: CPT | Performed by: INTERNAL MEDICINE

## 2022-12-05 PROCEDURE — 36415 COLL VENOUS BLD VENIPUNCTURE: CPT | Mod: PN | Performed by: INTERNAL MEDICINE

## 2022-12-05 PROCEDURE — 80061 LIPID PANEL: CPT | Performed by: INTERNAL MEDICINE

## 2022-12-06 ENCOUNTER — OFFICE VISIT (OUTPATIENT)
Dept: INTERNAL MEDICINE | Facility: CLINIC | Age: 67
End: 2022-12-06
Attending: INTERNAL MEDICINE
Payer: MEDICARE

## 2022-12-06 ENCOUNTER — HOSPITAL ENCOUNTER (OUTPATIENT)
Dept: CARDIOLOGY | Facility: OTHER | Age: 67
Discharge: HOME | End: 2022-12-06
Attending: INTERNAL MEDICINE
Payer: MEDICARE

## 2022-12-06 VITALS
HEIGHT: 70 IN | OXYGEN SATURATION: 97 % | SYSTOLIC BLOOD PRESSURE: 114 MMHG | BODY MASS INDEX: 22.66 KG/M2 | HEART RATE: 47 BPM | WEIGHT: 158.31 LBS | DIASTOLIC BLOOD PRESSURE: 72 MMHG

## 2022-12-06 DIAGNOSIS — D64.9 ANEMIA, UNSPECIFIED TYPE: ICD-10-CM

## 2022-12-06 DIAGNOSIS — I10 PRIMARY HYPERTENSION: ICD-10-CM

## 2022-12-06 DIAGNOSIS — K21.9 GASTROESOPHAGEAL REFLUX DISEASE WITHOUT ESOPHAGITIS: ICD-10-CM

## 2022-12-06 DIAGNOSIS — R00.1 BRADYCARDIA: ICD-10-CM

## 2022-12-06 DIAGNOSIS — Z00.00 ANNUAL PHYSICAL EXAM: Primary | ICD-10-CM

## 2022-12-06 DIAGNOSIS — E78.5 HYPERLIPIDEMIA, UNSPECIFIED HYPERLIPIDEMIA TYPE: ICD-10-CM

## 2022-12-06 PROCEDURE — 1126F PR PAIN SEVERITY QUANTIFIED, NO PAIN PRESENT: ICD-10-PCS | Mod: CPTII,S$GLB,, | Performed by: INTERNAL MEDICINE

## 2022-12-06 PROCEDURE — 99214 OFFICE O/P EST MOD 30 MIN: CPT | Mod: S$GLB,,, | Performed by: INTERNAL MEDICINE

## 2022-12-06 PROCEDURE — 3008F PR BODY MASS INDEX (BMI) DOCUMENTED: ICD-10-PCS | Mod: CPTII,S$GLB,, | Performed by: INTERNAL MEDICINE

## 2022-12-06 PROCEDURE — 3078F PR MOST RECENT DIASTOLIC BLOOD PRESSURE < 80 MM HG: ICD-10-PCS | Mod: CPTII,S$GLB,, | Performed by: INTERNAL MEDICINE

## 2022-12-06 PROCEDURE — 3288F PR FALLS RISK ASSESSMENT DOCUMENTED: ICD-10-PCS | Mod: CPTII,S$GLB,, | Performed by: INTERNAL MEDICINE

## 2022-12-06 PROCEDURE — 3074F SYST BP LT 130 MM HG: CPT | Mod: CPTII,S$GLB,, | Performed by: INTERNAL MEDICINE

## 2022-12-06 PROCEDURE — 3074F PR MOST RECENT SYSTOLIC BLOOD PRESSURE < 130 MM HG: ICD-10-PCS | Mod: CPTII,S$GLB,, | Performed by: INTERNAL MEDICINE

## 2022-12-06 PROCEDURE — 1101F PR PT FALLS ASSESS DOC 0-1 FALLS W/OUT INJ PAST YR: ICD-10-PCS | Mod: CPTII,S$GLB,, | Performed by: INTERNAL MEDICINE

## 2022-12-06 PROCEDURE — 3288F FALL RISK ASSESSMENT DOCD: CPT | Mod: CPTII,S$GLB,, | Performed by: INTERNAL MEDICINE

## 2022-12-06 PROCEDURE — 1159F PR MEDICATION LIST DOCUMENTED IN MEDICAL RECORD: ICD-10-PCS | Mod: CPTII,S$GLB,, | Performed by: INTERNAL MEDICINE

## 2022-12-06 PROCEDURE — 3078F DIAST BP <80 MM HG: CPT | Mod: CPTII,S$GLB,, | Performed by: INTERNAL MEDICINE

## 2022-12-06 PROCEDURE — 93005 ELECTROCARDIOGRAM TRACING: CPT

## 2022-12-06 PROCEDURE — 3008F BODY MASS INDEX DOCD: CPT | Mod: CPTII,S$GLB,, | Performed by: INTERNAL MEDICINE

## 2022-12-06 PROCEDURE — 1101F PT FALLS ASSESS-DOCD LE1/YR: CPT | Mod: CPTII,S$GLB,, | Performed by: INTERNAL MEDICINE

## 2022-12-06 PROCEDURE — 1159F MED LIST DOCD IN RCRD: CPT | Mod: CPTII,S$GLB,, | Performed by: INTERNAL MEDICINE

## 2022-12-06 PROCEDURE — 93010 EKG 12-LEAD: ICD-10-PCS | Mod: ,,, | Performed by: INTERNAL MEDICINE

## 2022-12-06 PROCEDURE — 3044F HG A1C LEVEL LT 7.0%: CPT | Mod: CPTII,S$GLB,, | Performed by: INTERNAL MEDICINE

## 2022-12-06 PROCEDURE — 3044F PR MOST RECENT HEMOGLOBIN A1C LEVEL <7.0%: ICD-10-PCS | Mod: CPTII,S$GLB,, | Performed by: INTERNAL MEDICINE

## 2022-12-06 PROCEDURE — 1126F AMNT PAIN NOTED NONE PRSNT: CPT | Mod: CPTII,S$GLB,, | Performed by: INTERNAL MEDICINE

## 2022-12-06 PROCEDURE — 93010 ELECTROCARDIOGRAM REPORT: CPT | Mod: ,,, | Performed by: INTERNAL MEDICINE

## 2022-12-06 PROCEDURE — 99999 PR PBB SHADOW E&M-EST. PATIENT-LVL III: ICD-10-PCS | Mod: PBBFAC,,, | Performed by: INTERNAL MEDICINE

## 2022-12-06 PROCEDURE — 99214 PR OFFICE/OUTPT VISIT, EST, LEVL IV, 30-39 MIN: ICD-10-PCS | Mod: S$GLB,,, | Performed by: INTERNAL MEDICINE

## 2022-12-06 PROCEDURE — 99999 PR PBB SHADOW E&M-EST. PATIENT-LVL III: CPT | Mod: PBBFAC,,, | Performed by: INTERNAL MEDICINE

## 2022-12-06 RX ORDER — LORATADINE 10 MG/1
TABLET ORAL
COMMUNITY
Start: 2010-03-15

## 2022-12-06 NOTE — PROGRESS NOTES
"Subjective:       Patient ID: Hugh Bates is a 67 y.o. male.    Chief Complaint: Annual Exam    Here for annual exam    66M with GERD, basal cell carcinoma - right shoulder s/p excision in 2020    Annual labs done prior to appointment. We reviewed labs together. We discussed routine age appropriate cancer screening guidelines along with vaccination schedules and their risk and benefits. A complete ROS was performed and patient had an opportunity to ask questions and be provided answers to the best of my knowledge.     Remains not interested in statin despite discussion of r/b    He remains quite active.  Chronic and miold and previously attributed to frequent blood draws as he donates blood regularly.               Review of Systems   Constitutional:  Negative for appetite change, chills, fever and unexpected weight change.   HENT:  Negative for hearing loss, sore throat and trouble swallowing.    Eyes:  Negative for visual disturbance.   Respiratory:  Negative for cough, chest tightness and shortness of breath.    Cardiovascular:  Negative for chest pain and leg swelling.   Gastrointestinal:  Negative for abdominal pain, blood in stool, constipation, diarrhea, nausea and vomiting.   Endocrine: Negative for polydipsia and polyuria.   Genitourinary:  Negative for decreased urine volume, difficulty urinating, dysuria, frequency and urgency.   Musculoskeletal:  Negative for gait problem.   Skin:  Negative for rash.   Neurological:  Negative for dizziness and numbness.   Psychiatric/Behavioral:  The patient is not nervous/anxious.      Objective:      Vitals:    12/06/22 1118   BP: 114/72   BP Location: Left arm   Pulse: (!) 47   SpO2: 97%   Weight: 71.8 kg (158 lb 4.6 oz)   Height: 5' 10" (1.778 m)      Physical Exam  Vitals and nursing note reviewed.   Constitutional:       General: He is not in acute distress.     Appearance: Normal appearance. He is well-developed.   HENT:      Head: Normocephalic and " atraumatic.      Mouth/Throat:      Pharynx: No oropharyngeal exudate.   Eyes:      General: No scleral icterus.     Conjunctiva/sclera: Conjunctivae normal.      Pupils: Pupils are equal, round, and reactive to light.   Neck:      Thyroid: No thyromegaly.   Cardiovascular:      Rate and Rhythm: Normal rate and regular rhythm.      Heart sounds: Normal heart sounds. No murmur heard.  Pulmonary:      Effort: Pulmonary effort is normal.      Breath sounds: Normal breath sounds. No wheezing or rales.   Abdominal:      General: There is no distension.   Musculoskeletal:         General: No tenderness.   Lymphadenopathy:      Cervical: No cervical adenopathy.   Skin:     General: Skin is warm and dry.   Neurological:      Mental Status: He is alert and oriented to person, place, and time.   Psychiatric:         Behavior: Behavior normal.       Assessment:       1. Annual physical exam    2. Bradycardia    3. Gastroesophageal reflux disease without esophagitis    4. Primary hypertension    5. Hyperlipidemia, unspecified hyperlipidemia type    6. Anemia, unspecified type        Plan:       Hugh was seen today for annual exam.    Diagnoses and all orders for this visit:    Annual physical exam    Bradycardia   Asymptomatic. Check for new high blocks  -     SCHEDULED EKG 12-LEAD (to Muse); Future    Gastroesophageal reflux disease without esophagitis    Primary hypertension   Controlled and asymptomatic.  Continue current Rx regimen.    Hyperlipidemia, unspecified hyperlipidemia type   Remains not interested in statin despite discussion of r/b  Anemia, unspecified type      monitor    RTC in 6 months or sooner jaimee Strong MD  Internal Medicine-[facility]        Side effects of medication(s) were discussed in detail and patient voiced understanding.  Patient will call back for any issues or complications.

## 2022-12-07 ENCOUNTER — PES CALL (OUTPATIENT)
Dept: ADMINISTRATIVE | Facility: CLINIC | Age: 67
End: 2022-12-07
Payer: MEDICARE

## 2023-01-19 ENCOUNTER — PATIENT MESSAGE (OUTPATIENT)
Dept: INTERNAL MEDICINE | Facility: CLINIC | Age: 68
End: 2023-01-19
Payer: MEDICARE

## 2023-02-07 DIAGNOSIS — Z00.00 ENCOUNTER FOR MEDICARE ANNUAL WELLNESS EXAM: ICD-10-CM

## 2023-02-09 DIAGNOSIS — Z00.00 ENCOUNTER FOR MEDICARE ANNUAL WELLNESS EXAM: ICD-10-CM

## 2023-03-30 ENCOUNTER — OFFICE VISIT (OUTPATIENT)
Dept: OPTOMETRY | Facility: CLINIC | Age: 68
End: 2023-03-30
Payer: MEDICARE

## 2023-03-30 DIAGNOSIS — H52.4 MYOPIA OF BOTH EYES WITH ASTIGMATISM AND PRESBYOPIA: ICD-10-CM

## 2023-03-30 DIAGNOSIS — Z97.3 WEARS CONTACT LENSES: ICD-10-CM

## 2023-03-30 DIAGNOSIS — H52.13 MYOPIA OF BOTH EYES WITH ASTIGMATISM AND PRESBYOPIA: ICD-10-CM

## 2023-03-30 DIAGNOSIS — H52.203 MYOPIA OF BOTH EYES WITH ASTIGMATISM AND PRESBYOPIA: ICD-10-CM

## 2023-03-30 DIAGNOSIS — H25.13 NUCLEAR SCLEROSIS, BILATERAL: Primary | ICD-10-CM

## 2023-03-30 DIAGNOSIS — Z13.5 GLAUCOMA SCREENING: ICD-10-CM

## 2023-03-30 DIAGNOSIS — Z46.0 FITTING AND ADJUSTMENT OF SPECTACLES AND CONTACT LENSES: Primary | ICD-10-CM

## 2023-03-30 PROCEDURE — 92310 CONTACT LENS FITTING OU: CPT | Mod: HCNC,S$GLB,, | Performed by: OPTOMETRIST

## 2023-03-30 PROCEDURE — 3288F FALL RISK ASSESSMENT DOCD: CPT | Mod: HCNC,CPTII,S$GLB, | Performed by: OPTOMETRIST

## 2023-03-30 PROCEDURE — 92310 PR CONTACT LENS FITTING (NO CHANGE): ICD-10-PCS | Mod: HCNC,S$GLB,, | Performed by: OPTOMETRIST

## 2023-03-30 PROCEDURE — 92004 COMPRE OPH EXAM NEW PT 1/>: CPT | Mod: HCNC,S$GLB,, | Performed by: OPTOMETRIST

## 2023-03-30 PROCEDURE — 92015 PR REFRACTION: ICD-10-PCS | Mod: HCNC,S$GLB,, | Performed by: OPTOMETRIST

## 2023-03-30 PROCEDURE — 92004 PR EYE EXAM, NEW PATIENT,COMPREHESV: ICD-10-PCS | Mod: HCNC,S$GLB,, | Performed by: OPTOMETRIST

## 2023-03-30 PROCEDURE — 1101F PT FALLS ASSESS-DOCD LE1/YR: CPT | Mod: HCNC,CPTII,S$GLB, | Performed by: OPTOMETRIST

## 2023-03-30 PROCEDURE — 1101F PR PT FALLS ASSESS DOC 0-1 FALLS W/OUT INJ PAST YR: ICD-10-PCS | Mod: HCNC,CPTII,S$GLB, | Performed by: OPTOMETRIST

## 2023-03-30 PROCEDURE — 1126F AMNT PAIN NOTED NONE PRSNT: CPT | Mod: HCNC,CPTII,S$GLB, | Performed by: OPTOMETRIST

## 2023-03-30 PROCEDURE — 3288F PR FALLS RISK ASSESSMENT DOCUMENTED: ICD-10-PCS | Mod: HCNC,CPTII,S$GLB, | Performed by: OPTOMETRIST

## 2023-03-30 PROCEDURE — 99999 PR PBB SHADOW E&M-EST. PATIENT-LVL II: ICD-10-PCS | Mod: PBBFAC,HCNC,, | Performed by: OPTOMETRIST

## 2023-03-30 PROCEDURE — 1126F PR PAIN SEVERITY QUANTIFIED, NO PAIN PRESENT: ICD-10-PCS | Mod: HCNC,CPTII,S$GLB, | Performed by: OPTOMETRIST

## 2023-03-30 PROCEDURE — 99999 PR PBB SHADOW E&M-EST. PATIENT-LVL II: CPT | Mod: PBBFAC,HCNC,, | Performed by: OPTOMETRIST

## 2023-03-30 PROCEDURE — 92015 DETERMINE REFRACTIVE STATE: CPT | Mod: HCNC,S$GLB,, | Performed by: OPTOMETRIST

## 2023-03-30 NOTE — PROGRESS NOTES
HPI    Presents today for routine/CLFU - [employer]. Pt reports trouble with   night driving. Pt denies eye pain, floaters and flashes. Use AT's gtts.  Last edited by Cadence Luna MA on 3/30/2023  2:41 PM.            Assessment /Plan     For exam results, see Encounter Report.    Nuclear sclerosis, bilateral    Glaucoma screening    Myopia of both eyes with astigmatism and presbyopia    Wears contact lenses      Small cats Ou  Good fti w BIOFINITY TORIC, but needs slight Rx change.  Also discussed need for MONTHLY exchange!    PLAN:    Sent pt to [facility] to get TRIALS in [place]--I do NOT need to see at disp   Continue Daily Wear schedule.  NO SLEEPING IN CONTACT LENSES. Clean and disinfect nightly.  exchange monthly.     If no problems will call for CLRx, rtc 1 yr

## 2023-03-31 ENCOUNTER — PATIENT MESSAGE (OUTPATIENT)
Dept: OPTOMETRY | Facility: CLINIC | Age: 68
End: 2023-03-31
Payer: MEDICARE

## 2023-05-09 ENCOUNTER — PATIENT MESSAGE (OUTPATIENT)
Dept: INTERNAL MEDICINE | Facility: CLINIC | Age: 68
End: 2023-05-09
Payer: MEDICARE

## 2023-05-11 ENCOUNTER — PATIENT MESSAGE (OUTPATIENT)
Dept: INTERNAL MEDICINE | Facility: CLINIC | Age: 68
End: 2023-05-11
Payer: MEDICARE

## 2023-05-17 PROBLEM — M51.369 DDD (DEGENERATIVE DISC DISEASE), LUMBAR: Status: ACTIVE | Noted: 2023-05-17

## 2023-05-17 PROBLEM — M51.36 DDD (DEGENERATIVE DISC DISEASE), LUMBAR: Status: ACTIVE | Noted: 2023-05-17

## 2023-05-17 PROBLEM — E87.1 HYPONATREMIA: Status: RESOLVED | Noted: 2017-02-16 | Resolved: 2023-05-17

## 2023-05-17 PROBLEM — E78.00 HYPERCHOLESTEREMIA: Status: ACTIVE | Noted: 2023-05-17

## 2023-05-18 ENCOUNTER — OFFICE VISIT (OUTPATIENT)
Dept: INTERNAL MEDICINE | Facility: CLINIC | Age: 68
End: 2023-05-18
Payer: MEDICARE

## 2023-05-18 VITALS
SYSTOLIC BLOOD PRESSURE: 116 MMHG | DIASTOLIC BLOOD PRESSURE: 68 MMHG | WEIGHT: 157.06 LBS | HEART RATE: 50 BPM | BODY MASS INDEX: 23.26 KG/M2 | RESPIRATION RATE: 14 BRPM | HEIGHT: 69 IN

## 2023-05-18 DIAGNOSIS — M51.36 DDD (DEGENERATIVE DISC DISEASE), LUMBAR: ICD-10-CM

## 2023-05-18 DIAGNOSIS — Z00.00 ENCOUNTER FOR PREVENTIVE HEALTH EXAMINATION: ICD-10-CM

## 2023-05-18 DIAGNOSIS — Z00.00 ENCOUNTER FOR MEDICARE ANNUAL WELLNESS EXAM: Primary | ICD-10-CM

## 2023-05-18 DIAGNOSIS — D72.819 LEUKOPENIA, UNSPECIFIED TYPE: ICD-10-CM

## 2023-05-18 DIAGNOSIS — E78.00 HYPERCHOLESTEREMIA: ICD-10-CM

## 2023-05-18 PROBLEM — Z97.3 WEARS CONTACT LENSES: Status: RESOLVED | Noted: 2023-03-30 | Resolved: 2023-05-18

## 2023-05-18 PROCEDURE — 1101F PT FALLS ASSESS-DOCD LE1/YR: CPT | Mod: CPTII,,, | Performed by: INTERNAL MEDICINE

## 2023-05-18 PROCEDURE — 3288F FALL RISK ASSESSMENT DOCD: CPT | Mod: CPTII,,, | Performed by: INTERNAL MEDICINE

## 2023-05-18 PROCEDURE — 1126F PR PAIN SEVERITY QUANTIFIED, NO PAIN PRESENT: ICD-10-PCS | Mod: CPTII,,, | Performed by: INTERNAL MEDICINE

## 2023-05-18 PROCEDURE — 3074F PR MOST RECENT SYSTOLIC BLOOD PRESSURE < 130 MM HG: ICD-10-PCS | Mod: CPTII,,, | Performed by: INTERNAL MEDICINE

## 2023-05-18 PROCEDURE — 3008F BODY MASS INDEX DOCD: CPT | Mod: CPTII,,, | Performed by: INTERNAL MEDICINE

## 2023-05-18 PROCEDURE — 99999 PR PBB SHADOW E&M-EST. PATIENT-LVL IV: CPT | Mod: PBBFAC,,, | Performed by: NURSE PRACTITIONER

## 2023-05-18 PROCEDURE — 1159F MED LIST DOCD IN RCRD: CPT | Mod: CPTII,,, | Performed by: INTERNAL MEDICINE

## 2023-05-18 PROCEDURE — 99214 OFFICE O/P EST MOD 30 MIN: CPT | Mod: PO | Performed by: NURSE PRACTITIONER

## 2023-05-18 PROCEDURE — 1160F RVW MEDS BY RX/DR IN RCRD: CPT | Mod: CPTII,,, | Performed by: INTERNAL MEDICINE

## 2023-05-18 PROCEDURE — 3078F DIAST BP <80 MM HG: CPT | Mod: CPTII,,, | Performed by: INTERNAL MEDICINE

## 2023-05-18 PROCEDURE — 1126F AMNT PAIN NOTED NONE PRSNT: CPT | Mod: CPTII,,, | Performed by: INTERNAL MEDICINE

## 2023-05-18 PROCEDURE — 1170F PR FUNCTIONAL STATUS ASSESSED: ICD-10-PCS | Mod: CPTII,,, | Performed by: INTERNAL MEDICINE

## 2023-05-18 PROCEDURE — 1101F PR PT FALLS ASSESS DOC 0-1 FALLS W/OUT INJ PAST YR: ICD-10-PCS | Mod: CPTII,,, | Performed by: INTERNAL MEDICINE

## 2023-05-18 PROCEDURE — 3288F PR FALLS RISK ASSESSMENT DOCUMENTED: ICD-10-PCS | Mod: CPTII,,, | Performed by: INTERNAL MEDICINE

## 2023-05-18 PROCEDURE — G0439 PPPS, SUBSEQ VISIT: HCPCS | Mod: ,,, | Performed by: INTERNAL MEDICINE

## 2023-05-18 PROCEDURE — 1160F PR REVIEW ALL MEDS BY PRESCRIBER/CLIN PHARMACIST DOCUMENTED: ICD-10-PCS | Mod: CPTII,,, | Performed by: INTERNAL MEDICINE

## 2023-05-18 PROCEDURE — 3078F PR MOST RECENT DIASTOLIC BLOOD PRESSURE < 80 MM HG: ICD-10-PCS | Mod: CPTII,,, | Performed by: INTERNAL MEDICINE

## 2023-05-18 PROCEDURE — 3008F PR BODY MASS INDEX (BMI) DOCUMENTED: ICD-10-PCS | Mod: CPTII,,, | Performed by: INTERNAL MEDICINE

## 2023-05-18 PROCEDURE — 99999 PR PBB SHADOW E&M-EST. PATIENT-LVL IV: ICD-10-PCS | Mod: PBBFAC,,, | Performed by: NURSE PRACTITIONER

## 2023-05-18 PROCEDURE — G0439 PR MEDICARE ANNUAL WELLNESS SUBSEQUENT VISIT: ICD-10-PCS | Mod: ,,, | Performed by: INTERNAL MEDICINE

## 2023-05-18 PROCEDURE — 1159F PR MEDICATION LIST DOCUMENTED IN MEDICAL RECORD: ICD-10-PCS | Mod: CPTII,,, | Performed by: INTERNAL MEDICINE

## 2023-05-18 PROCEDURE — 3074F SYST BP LT 130 MM HG: CPT | Mod: CPTII,,, | Performed by: INTERNAL MEDICINE

## 2023-05-18 PROCEDURE — 1170F FXNL STATUS ASSESSED: CPT | Mod: CPTII,,, | Performed by: INTERNAL MEDICINE

## 2023-05-18 NOTE — PATIENT INSTRUCTIONS
Counseling and Referral of Other Preventative  (Italic type indicates deductible and co-insurance are waived)    Patient Name: Hugh Bates  Today's Date: 5/18/2023    Health Maintenance         Date Due Completion Date    TETANUS VACCINE 12/14/2025 12/14/2015 (Done)        Colorectal Cancer Screening 09/30/2026 9/30/2016        Lipid Panel 12/05/2023 12/5/2022          No orders of the defined types were placed in this encounter.      The following information is provided to all patients.  This information is to help you find resources for any of the problems found today that may be affecting your health:                Living healthy guide: www.[facility].[place].[facility]      Understanding Diabetes: www.diabetes.org      Eating healthy: www.cdc.gov/healthyweight      CDC home safety checklist: www.cdc.gov/steadi/patient.html      Agency on Aging: www.goea.[place].[facility]      Alcoholics anonymous (AA): www.aa.org      Physical Activity: www.vibha.nih.gov/ad2uofh      Tobacco use: www.quitwithusla.org

## 2023-05-18 NOTE — PROGRESS NOTES
"I offered to discuss advanced care planning, including how to pick a person who would make decisions for you if you were unable to make them for yourself, called a health care power of , and what kind of decisions you might make such as use of life sustaining treatments such as ventilators and tube feeding when faced with a life limiting illness recorded on a living will that they will need to know. (How you want to be cared for as you near the end of your natural life)     X  Patient has advanced directives written and agrees to provide copies to the institution.    Hugh Jana presented for a  Medicare AWV and comprehensive Health Risk Assessment today. The following components were reviewed and updated:    Medical history  Family History  Social history  Allergies and Current Medications  Health Risk Assessment  Health Maintenance  Care Team     ** See Completed Assessments for Annual Wellness Visit within the encounter summary.**       The following assessments were completed:  Living Situation  CAGE  Depression Screening  Timed Get Up and Go  Whisper Test  Cognitive Function Screening  Nutrition Screening  ADL Screening  PAQ Screening      Vitals:    05/18/23 0812   BP: 116/68   BP Location: Right arm   Patient Position: Sitting   Pulse: (!) 50   Resp: 14   Weight: 71.2 kg (157 lb 1.2 oz)   Height: 5' 9" (1.753 m)     Body mass index is 23.2 kg/m².  Physical Exam  Constitutional:       Comments: Younger in appearance than age   HENT:      Right Ear: There is no impacted cerumen.      Left Ear: There is no impacted cerumen.   Eyes:      General: No scleral icterus.  Neck:      Vascular: No carotid bruit.   Cardiovascular:      Rate and Rhythm: Regular rhythm. Bradycardia present.      Heart sounds: Murmur heard.   Pulmonary:      Effort: Pulmonary effort is normal.      Breath sounds: Normal breath sounds.   Abdominal:      Palpations: Abdomen is soft.   Musculoskeletal:         General: No " swelling. Normal range of motion.   Skin:     General: Skin is warm and dry.   Neurological:      Mental Status: He is alert and oriented to person, place, and time.   Psychiatric:         Mood and Affect: Mood normal.         Behavior: Behavior normal.         Thought Content: Thought content normal.         Judgment: Judgment normal.          Medication review & Opioid screening performed during rooming section. No prescribed opioid medications noted.  Review for substance use disorder screening performed during rooming section. No substance abuse noted on screening.      Diagnoses and health risks identified today and associated recommendations/orders:    1. Hypercholesteremia  Stable followed by PCP    2. DDD (degenerative disc disease), lumbar  Stable followed by spine clinic    3. Encounter for Medicare annual wellness exam  Here for Health Risk Assessment/Annual Wellness Visit.  Health maintenance reviewed and updated. Follow up in one year.    - Ambulatory Referral/Consult to Enhanced Annual Wellness Visit (eAWV)    4. Leukopenia, unspecified type  Stable followed by PCP    5. Encounter for preventive health examination  Here for Health Risk Assessment/Annual Wellness Visit.  Health maintenance reviewed and updated. Follow up in one year.          Provided Peter with a 5-10 year written screening schedule and personal prevention plan. Recommendations were developed using the USPSTF age appropriate recommendations. Education, counseling, and referrals were provided as needed. After Visit Summary printed and given to patient which includes a list of additional screenings\tests needed. Problem list reviewed & updated with patient. States UTD w skin ca screening.  Follow up in about 1 year (around 5/18/2024) for HRA.    Cristy Amador NP

## 2023-06-27 ENCOUNTER — TELEPHONE (OUTPATIENT)
Dept: DERMATOLOGY | Facility: CLINIC | Age: 68
End: 2023-06-27
Payer: MEDICARE

## 2023-06-27 NOTE — TELEPHONE ENCOUNTER
----- Message from Chrystal Nielson sent at 6/27/2023 10:56 AM CDT -----  Regarding: Rescheduling skin check, needs earlier time  Contact: @[phone]  Rescheduling skin check, needs earlier time, patient's grandchildren rescheduled vacation plans and patient has to coordinate with them, please call and advise @[phone]

## 2023-08-17 ENCOUNTER — OFFICE VISIT (OUTPATIENT)
Dept: DERMATOLOGY | Facility: CLINIC | Age: 68
End: 2023-08-17
Payer: MEDICARE

## 2023-08-17 DIAGNOSIS — D48.5 NEOPLASM OF UNCERTAIN BEHAVIOR OF SKIN: Primary | ICD-10-CM

## 2023-08-17 DIAGNOSIS — D22.5 MELANOCYTIC NEVUS OF TRUNK: ICD-10-CM

## 2023-08-17 DIAGNOSIS — L81.4 LENTIGINES: ICD-10-CM

## 2023-08-17 DIAGNOSIS — Z12.83 SCREENING EXAM FOR SKIN CANCER: ICD-10-CM

## 2023-08-17 DIAGNOSIS — L57.0 ACTINIC KERATOSIS: ICD-10-CM

## 2023-08-17 DIAGNOSIS — Z85.828 HISTORY OF NONMELANOMA SKIN CANCER: ICD-10-CM

## 2023-08-17 PROCEDURE — 1160F RVW MEDS BY RX/DR IN RCRD: CPT | Mod: CPTII,S$GLB,, | Performed by: DERMATOLOGY

## 2023-08-17 PROCEDURE — 1126F PR PAIN SEVERITY QUANTIFIED, NO PAIN PRESENT: ICD-10-PCS | Mod: CPTII,S$GLB,, | Performed by: DERMATOLOGY

## 2023-08-17 PROCEDURE — 1160F PR REVIEW ALL MEDS BY PRESCRIBER/CLIN PHARMACIST DOCUMENTED: ICD-10-PCS | Mod: CPTII,S$GLB,, | Performed by: DERMATOLOGY

## 2023-08-17 PROCEDURE — 11102 PR TANGENTIAL BIOPSY, SKIN, SINGLE LESION: ICD-10-PCS | Mod: S$GLB,,, | Performed by: DERMATOLOGY

## 2023-08-17 PROCEDURE — 3288F PR FALLS RISK ASSESSMENT DOCUMENTED: ICD-10-PCS | Mod: CPTII,S$GLB,, | Performed by: DERMATOLOGY

## 2023-08-17 PROCEDURE — 88305 TISSUE EXAM BY PATHOLOGIST: CPT | Mod: 26,,, | Performed by: PATHOLOGY

## 2023-08-17 PROCEDURE — 1101F PR PT FALLS ASSESS DOC 0-1 FALLS W/OUT INJ PAST YR: ICD-10-PCS | Mod: CPTII,S$GLB,, | Performed by: DERMATOLOGY

## 2023-08-17 PROCEDURE — 1126F AMNT PAIN NOTED NONE PRSNT: CPT | Mod: CPTII,S$GLB,, | Performed by: DERMATOLOGY

## 2023-08-17 PROCEDURE — 1159F PR MEDICATION LIST DOCUMENTED IN MEDICAL RECORD: ICD-10-PCS | Mod: CPTII,S$GLB,, | Performed by: DERMATOLOGY

## 2023-08-17 PROCEDURE — 3288F FALL RISK ASSESSMENT DOCD: CPT | Mod: CPTII,S$GLB,, | Performed by: DERMATOLOGY

## 2023-08-17 PROCEDURE — 88305 TISSUE EXAM BY PATHOLOGIST: ICD-10-PCS | Mod: 26,,, | Performed by: PATHOLOGY

## 2023-08-17 PROCEDURE — 1159F MED LIST DOCD IN RCRD: CPT | Mod: CPTII,S$GLB,, | Performed by: DERMATOLOGY

## 2023-08-17 PROCEDURE — 99213 OFFICE O/P EST LOW 20 MIN: CPT | Mod: 25,S$GLB,, | Performed by: DERMATOLOGY

## 2023-08-17 PROCEDURE — 17003 DESTRUCT PREMALG LES 2-14: CPT | Mod: S$GLB,,, | Performed by: DERMATOLOGY

## 2023-08-17 PROCEDURE — 88305 TISSUE EXAM BY PATHOLOGIST: CPT | Mod: HCNC | Performed by: PATHOLOGY

## 2023-08-17 PROCEDURE — 88342 IMHCHEM/IMCYTCHM 1ST ANTB: CPT | Mod: 26,,, | Performed by: PATHOLOGY

## 2023-08-17 PROCEDURE — 17000 PR DESTRUCTION(LASER SURGERY,CRYOSURGERY,CHEMOSURGERY),PREMALIGNANT LESIONS,FIRST LESION: ICD-10-PCS | Mod: XS,S$GLB,, | Performed by: DERMATOLOGY

## 2023-08-17 PROCEDURE — 11102 TANGNTL BX SKIN SINGLE LES: CPT | Mod: S$GLB,,, | Performed by: DERMATOLOGY

## 2023-08-17 PROCEDURE — 17000 DESTRUCT PREMALG LESION: CPT | Mod: XS,S$GLB,, | Performed by: DERMATOLOGY

## 2023-08-17 PROCEDURE — 88342 IMHCHEM/IMCYTCHM 1ST ANTB: CPT | Mod: HCNC | Performed by: PATHOLOGY

## 2023-08-17 PROCEDURE — 1101F PT FALLS ASSESS-DOCD LE1/YR: CPT | Mod: CPTII,S$GLB,, | Performed by: DERMATOLOGY

## 2023-08-17 PROCEDURE — 88342 CHG IMMUNOCYTOCHEMISTRY: ICD-10-PCS | Mod: 26,,, | Performed by: PATHOLOGY

## 2023-08-17 PROCEDURE — 99213 PR OFFICE/OUTPT VISIT, EST, LEVL III, 20-29 MIN: ICD-10-PCS | Mod: 25,S$GLB,, | Performed by: DERMATOLOGY

## 2023-08-17 PROCEDURE — 17003 DESTRUCTION, PREMALIGNANT LESIONS; SECOND THROUGH 14 LESIONS: ICD-10-PCS | Mod: S$GLB,,, | Performed by: DERMATOLOGY

## 2023-08-17 NOTE — PROGRESS NOTES
"  Patient Information  Name: Hugh Bates  : 1955  MRN: [id number]     Referring Physician:  No ref. provider found   Primary Care Physician:  Joseph Santana MD   Date of Visit: 2023      Subjective:     History of Present lllness:    Hugh Bates is a 68 y.o. male who presents with a chief complaint of moles.  This is a high risk patient with a personal history of nonmelanoma skin cancer who is here today to check for the development of new lesions.  Patient is here today for a "mole" check.   Today, patient has no additional complaints. Denies any new, changing, or symptomatic lesions on the skin.    Patient was last seen: 2022.  Prior notes by myself reviewed.   Clinical documentation obtained by nursing staff reviewed.    Review of Systems   Skin:  Positive for daily sunscreen use, activity-related sunscreen use and wears hat. Negative for itching and rash.   Hematologic/Lymphatic: Does not bruise/bleed easily.       Objective:   Physical Exam   Constitutional: He appears well-developed and well-nourished. No distress.   HENT:   Mouth/Throat: Lips normal.    Eyes: Lids are normal.  No conjunctival no injection.   Neurological: He is alert and oriented to person, place, and time. He is not disoriented.   Psychiatric: He has a normal mood and affect.   Skin:   Areas Examined (abnormalities noted in diagram):   Scalp / Hair Palpated and Inspected  Head / Face Inspection Performed  Neck Inspection Performed  Chest / Axilla Inspection Performed  Abdomen Inspection Performed  Genitals / Buttocks / Groin Inspection Performed  Back Inspection Performed  RUE Inspected  LUE Inspection Performed  RLE Inspected  LLE Inspection Performed  Nails and Digits Inspection Performed                 Diagram Legend     Erythematous scaling macule/papule c/w actinic keratosis       Vascular papule c/w angioma      Pigmented verrucoid papule/plaque c/w seborrheic keratosis      Yellow umbilicated papule " c/w sebaceous hyperplasia      Irregularly shaped tan macule c/w lentigo     1-2 mm smooth white papules consistent with Milia      Movable subcutaneous cyst with punctum c/w epidermal inclusion cyst      Subcutaneous movable cyst c/w pilar cyst      Firm pink to brown papule c/w dermatofibroma      Pedunculated fleshy papule(s) c/w skin tag(s)      Evenly pigmented macule c/w junctional nevus     Mildly variegated pigmented, slightly irregular-bordered macule c/w mildly atypical nevus      Flesh colored to evenly pigmented papule c/w intradermal nevus       Pink pearly papule/plaque c/w basal cell carcinoma      Erythematous hyperkeratotic cursted plaque c/w SCC      Surgical scar with no sign of skin cancer recurrence      Open and closed comedones      Inflammatory papules and pustules      Verrucoid papule consistent consistent with wart     Erythematous eczematous patches and plaques     Dystrophic onycholytic nail with subungual debris c/w onychomycosis     Umbilicated papule    Erythematous-base heme-crusted tan verrucoid plaque consistent with inflamed seborrheic keratosis     Erythematous Silvery Scaling Plaque c/w Psoriasis     See annotation          [] Data reviewed  [] Prior external notes reviewed  [] Independent review of test  [] Management discussed with another provider  [] Independent historian    Assessment / Plan:      Pathology Orders:       Normal Orders This Visit    Specimen to Pathology, Dermatology     Questions:    Procedure Type: Dermatology and skin neoplasms    Number of Specimens: 1    ------------------------: -------------------------    Spec 1 Procedure: Biopsy    Spec 1 Clinical Impression: r/o dysplastic nevus vs lentigo maligna    Spec 1 Source: left forearm    Release to patient:           Neoplasm of uncertain behavior of skin  -     Specimen to Pathology, Dermatology    Shave biopsy procedure note:  Risk, benefits, and alternatives of biopsy are discussed with the patient,  including risk of infection, scar, recurrence, and need for additional treatment of site. The patient agrees to the procedure by verbal consent. The area is marked and prepped with alcohol.  Approximately 1 mL of lidocaine 1% with epinephrine is used for local anesthesia. A sharp blade is used to remove the lesion. The specimen is sent for pathology. Hemostasis is obtained with aluminum chloride and/or monopolar hyfrecation if needed. The area is then dressed and bandaged. The patient tolerated the procedure well without adverse event. Written instructions on wound care were given and were reviewed with the patient, who is to call for any signs of bleeding or infection. The patient will be notified of the pathology results.    Actinic keratosis  Cryosurgery procedure note:  Risk, benefits, and alternatives of cryosurgery are discussed with the patient, including but not limited to the risks of hypopigmentation, hyperpigmentation, scar, infection, recurrence of lesion(s), development of new lesion(s), and need for additional treatment of the lesion(s). Verbal consent obtained from patient. Liquid nitrogen cryosurgery applied to 9 lesion(s) to produce a freeze injury. Counseled patient that blisters may form, and instructed patient on wound care with gentle cleansing and use of Vaseline ointment to keep moist until healed. Handout was provided, and patient was instructed to return to clinic in 1-2 months if lesions do not completely resolve.    Melanocytic nevus of trunk  Benign-appearing nevi on exam today. Counseled patient to periodically examine moles and return to clinic if any changes in size, shape, or color are noted or if it becomes symptomatic (bleeding, itching, pain, etc).    Lentigines  These are benign sun spots which should be monitored for changes. Daily sun protection will reduce the number of new lesions.   Recommend using a broad-spectrum, water-resistant sunscreen with SPF of 30 or higher--reapply  every 2 hours. Seek shade, wear sun-protective clothing, and perform regular skin self-exams.    Screening exam for skin cancer  Total body skin examination performed today as noted in physical exam. Suspicious lesion(s) were noted and/or biopsied as above.  Recommend using a broad-spectrum, water-resistant sunscreen with SPF of 30 or higher--reapply every 2 hours. Seek shade, wear sun-protective clothing, and perform regular skin self-exams.    History of nonmelanoma skin cancer   - stable and chronic  Area(s) of previous nonmelanoma skin cancer evaluated with no evidence of recurrence. Reassurance provided.  Recommend using a broad-spectrum, water-resistant sunscreen with SPF of 30 or higher--reapply every 2 hours. Seek shade, wear sun-protective clothing, and perform regular skin self-exams.      Follow up in about 6 months (around 2/17/2024).      Marcela Francis MD, FAAD  [facility]

## 2023-08-21 PROBLEM — Z00.00 ENCOUNTER FOR MEDICARE ANNUAL WELLNESS EXAM: Chronic | Status: RESOLVED | Noted: 2023-05-18 | Resolved: 2023-08-21

## 2023-08-21 PROBLEM — Z00.00 ENCOUNTER FOR PREVENTIVE HEALTH EXAMINATION: Chronic | Status: RESOLVED | Noted: 2023-05-18 | Resolved: 2023-08-21

## 2023-08-23 ENCOUNTER — PATIENT MESSAGE (OUTPATIENT)
Dept: INTERNAL MEDICINE | Facility: CLINIC | Age: 68
End: 2023-08-23
Payer: MEDICARE

## 2023-08-23 LAB
FINAL PATHOLOGIC DIAGNOSIS: NORMAL
GROSS: NORMAL
Lab: NORMAL
MICROSCOPIC EXAM: NORMAL

## 2023-10-19 ENCOUNTER — PATIENT MESSAGE (OUTPATIENT)
Dept: INTERNAL MEDICINE | Facility: CLINIC | Age: 68
End: 2023-10-19
Payer: MEDICARE

## 2023-10-19 DIAGNOSIS — R23.4 CHANGES IN SKIN TEXTURE: ICD-10-CM

## 2023-10-19 DIAGNOSIS — Z12.5 ENCOUNTER FOR SCREENING FOR MALIGNANT NEOPLASM OF PROSTATE: ICD-10-CM

## 2023-10-19 DIAGNOSIS — R79.9 ABNORMAL FINDING OF BLOOD CHEMISTRY, UNSPECIFIED: ICD-10-CM

## 2023-10-19 DIAGNOSIS — Z00.00 ANNUAL PHYSICAL EXAM: Primary | ICD-10-CM

## 2023-11-03 ENCOUNTER — LAB VISIT (OUTPATIENT)
Dept: LAB | Facility: HOSPITAL | Age: 68
End: 2023-11-03
Attending: INTERNAL MEDICINE
Payer: MEDICARE

## 2023-11-03 DIAGNOSIS — R23.4 CHANGES IN SKIN TEXTURE: ICD-10-CM

## 2023-11-03 DIAGNOSIS — Z00.00 ANNUAL PHYSICAL EXAM: ICD-10-CM

## 2023-11-03 DIAGNOSIS — R79.9 ABNORMAL FINDING OF BLOOD CHEMISTRY, UNSPECIFIED: ICD-10-CM

## 2023-11-03 DIAGNOSIS — Z12.5 ENCOUNTER FOR SCREENING FOR MALIGNANT NEOPLASM OF PROSTATE: ICD-10-CM

## 2023-11-03 LAB
ALBUMIN SERPL BCP-MCNC: 3.8 G/DL (ref 3.5–5.2)
ALP SERPL-CCNC: 46 U/L (ref 55–135)
ALT SERPL W/O P-5'-P-CCNC: 34 U/L (ref 10–44)
ANION GAP SERPL CALC-SCNC: 9 MMOL/L (ref 8–16)
AST SERPL-CCNC: 39 U/L (ref 10–40)
BASOPHILS # BLD AUTO: 0.03 K/UL (ref 0–0.2)
BASOPHILS NFR BLD: 1 % (ref 0–1.9)
BILIRUB SERPL-MCNC: 0.7 MG/DL (ref 0.1–1)
BUN SERPL-MCNC: 18 MG/DL (ref 8–23)
CALCIUM SERPL-MCNC: 9.5 MG/DL (ref 8.7–10.5)
CHLORIDE SERPL-SCNC: 100 MMOL/L (ref 95–110)
CHOLEST SERPL-MCNC: 282 MG/DL (ref 120–199)
CHOLEST/HDLC SERPL: 2.7 {RATIO} (ref 2–5)
CO2 SERPL-SCNC: 24 MMOL/L (ref 23–29)
COMPLEXED PSA SERPL-MCNC: 0.68 NG/ML (ref 0–4)
CREAT SERPL-MCNC: 1 MG/DL (ref 0.5–1.4)
DIFFERENTIAL METHOD: ABNORMAL
EOSINOPHIL # BLD AUTO: 0.1 K/UL (ref 0–0.5)
EOSINOPHIL NFR BLD: 3.1 % (ref 0–8)
ERYTHROCYTE [DISTWIDTH] IN BLOOD BY AUTOMATED COUNT: 12.8 % (ref 11.5–14.5)
EST. GFR  (NO RACE VARIABLE): >60 ML/MIN/1.73 M^2
GLUCOSE SERPL-MCNC: 90 MG/DL (ref 70–110)
HCT VFR BLD AUTO: 42.6 % (ref 40–54)
HDLC SERPL-MCNC: 103 MG/DL (ref 40–75)
HDLC SERPL: 36.5 % (ref 20–50)
HGB BLD-MCNC: 14.2 G/DL (ref 14–18)
IMM GRANULOCYTES # BLD AUTO: 0 K/UL (ref 0–0.04)
IMM GRANULOCYTES NFR BLD AUTO: 0 % (ref 0–0.5)
LDLC SERPL CALC-MCNC: 171 MG/DL (ref 63–159)
LYMPHOCYTES # BLD AUTO: 0.8 K/UL (ref 1–4.8)
LYMPHOCYTES NFR BLD: 26.2 % (ref 18–48)
MCH RBC QN AUTO: 31.9 PG (ref 27–31)
MCHC RBC AUTO-ENTMCNC: 33.3 G/DL (ref 32–36)
MCV RBC AUTO: 96 FL (ref 82–98)
MONOCYTES # BLD AUTO: 0.3 K/UL (ref 0.3–1)
MONOCYTES NFR BLD: 9.8 % (ref 4–15)
NEUTROPHILS # BLD AUTO: 1.7 K/UL (ref 1.8–7.7)
NEUTROPHILS NFR BLD: 59.9 % (ref 38–73)
NONHDLC SERPL-MCNC: 179 MG/DL
NRBC BLD-RTO: 0 /100 WBC
PLATELET # BLD AUTO: 239 K/UL (ref 150–450)
PMV BLD AUTO: 11 FL (ref 9.2–12.9)
POTASSIUM SERPL-SCNC: 5.4 MMOL/L (ref 3.5–5.1)
PROT SERPL-MCNC: 6.5 G/DL (ref 6–8.4)
RBC # BLD AUTO: 4.45 M/UL (ref 4.6–6.2)
SODIUM SERPL-SCNC: 133 MMOL/L (ref 136–145)
TRIGL SERPL-MCNC: 40 MG/DL (ref 30–150)
TSH SERPL DL<=0.005 MIU/L-ACNC: 1.67 UIU/ML (ref 0.4–4)
WBC # BLD AUTO: 2.86 K/UL (ref 3.9–12.7)

## 2023-11-03 PROCEDURE — 80053 COMPREHEN METABOLIC PANEL: CPT | Mod: HCNC | Performed by: INTERNAL MEDICINE

## 2023-11-03 PROCEDURE — 83036 HEMOGLOBIN GLYCOSYLATED A1C: CPT | Mod: HCNC | Performed by: INTERNAL MEDICINE

## 2023-11-03 PROCEDURE — 80061 LIPID PANEL: CPT | Mod: HCNC | Performed by: INTERNAL MEDICINE

## 2023-11-03 PROCEDURE — 84443 ASSAY THYROID STIM HORMONE: CPT | Mod: HCNC | Performed by: INTERNAL MEDICINE

## 2023-11-03 PROCEDURE — 84153 ASSAY OF PSA TOTAL: CPT | Mod: GA,HCNC | Performed by: INTERNAL MEDICINE

## 2023-11-03 PROCEDURE — 36415 COLL VENOUS BLD VENIPUNCTURE: CPT | Mod: HCNC,PN | Performed by: INTERNAL MEDICINE

## 2023-11-03 PROCEDURE — 85025 COMPLETE CBC W/AUTO DIFF WBC: CPT | Mod: HCNC | Performed by: INTERNAL MEDICINE

## 2023-11-04 LAB
ESTIMATED AVG GLUCOSE: 111 MG/DL (ref 68–131)
HBA1C MFR BLD: 5.5 % (ref 4–5.6)

## 2023-11-10 ENCOUNTER — OFFICE VISIT (OUTPATIENT)
Dept: INTERNAL MEDICINE | Facility: CLINIC | Age: 68
End: 2023-11-10
Attending: INTERNAL MEDICINE
Payer: MEDICARE

## 2023-11-10 VITALS
SYSTOLIC BLOOD PRESSURE: 120 MMHG | HEART RATE: 77 BPM | OXYGEN SATURATION: 98 % | BODY MASS INDEX: 23.05 KG/M2 | DIASTOLIC BLOOD PRESSURE: 70 MMHG | WEIGHT: 155.63 LBS | HEIGHT: 69 IN

## 2023-11-10 DIAGNOSIS — E78.00 HYPERCHOLESTEREMIA: ICD-10-CM

## 2023-11-10 DIAGNOSIS — Z13.6 ENCOUNTER FOR SCREENING FOR CARDIOVASCULAR DISORDERS: ICD-10-CM

## 2023-11-10 DIAGNOSIS — D72.819 LEUKOPENIA, UNSPECIFIED TYPE: ICD-10-CM

## 2023-11-10 DIAGNOSIS — Z00.00 ANNUAL PHYSICAL EXAM: Primary | ICD-10-CM

## 2023-11-10 PROCEDURE — 1126F AMNT PAIN NOTED NONE PRSNT: CPT | Mod: HCNC,CPTII,S$GLB, | Performed by: INTERNAL MEDICINE

## 2023-11-10 PROCEDURE — 99214 OFFICE O/P EST MOD 30 MIN: CPT | Mod: HCNC,S$GLB,, | Performed by: INTERNAL MEDICINE

## 2023-11-10 PROCEDURE — 1160F RVW MEDS BY RX/DR IN RCRD: CPT | Mod: HCNC,CPTII,S$GLB, | Performed by: INTERNAL MEDICINE

## 2023-11-10 PROCEDURE — 3044F PR MOST RECENT HEMOGLOBIN A1C LEVEL <7.0%: ICD-10-PCS | Mod: HCNC,CPTII,S$GLB, | Performed by: INTERNAL MEDICINE

## 2023-11-10 PROCEDURE — 99999 PR PBB SHADOW E&M-EST. PATIENT-LVL IV: ICD-10-PCS | Mod: PBBFAC,HCNC,, | Performed by: INTERNAL MEDICINE

## 2023-11-10 PROCEDURE — 3044F HG A1C LEVEL LT 7.0%: CPT | Mod: HCNC,CPTII,S$GLB, | Performed by: INTERNAL MEDICINE

## 2023-11-10 PROCEDURE — 3008F BODY MASS INDEX DOCD: CPT | Mod: HCNC,CPTII,S$GLB, | Performed by: INTERNAL MEDICINE

## 2023-11-10 PROCEDURE — 3078F DIAST BP <80 MM HG: CPT | Mod: HCNC,CPTII,S$GLB, | Performed by: INTERNAL MEDICINE

## 2023-11-10 PROCEDURE — 1159F MED LIST DOCD IN RCRD: CPT | Mod: HCNC,CPTII,S$GLB, | Performed by: INTERNAL MEDICINE

## 2023-11-10 PROCEDURE — 1159F PR MEDICATION LIST DOCUMENTED IN MEDICAL RECORD: ICD-10-PCS | Mod: HCNC,CPTII,S$GLB, | Performed by: INTERNAL MEDICINE

## 2023-11-10 PROCEDURE — 99999 PR PBB SHADOW E&M-EST. PATIENT-LVL IV: CPT | Mod: PBBFAC,HCNC,, | Performed by: INTERNAL MEDICINE

## 2023-11-10 PROCEDURE — 3074F PR MOST RECENT SYSTOLIC BLOOD PRESSURE < 130 MM HG: ICD-10-PCS | Mod: HCNC,CPTII,S$GLB, | Performed by: INTERNAL MEDICINE

## 2023-11-10 PROCEDURE — 1160F PR REVIEW ALL MEDS BY PRESCRIBER/CLIN PHARMACIST DOCUMENTED: ICD-10-PCS | Mod: HCNC,CPTII,S$GLB, | Performed by: INTERNAL MEDICINE

## 2023-11-10 PROCEDURE — 99214 PR OFFICE/OUTPT VISIT, EST, LEVL IV, 30-39 MIN: ICD-10-PCS | Mod: HCNC,S$GLB,, | Performed by: INTERNAL MEDICINE

## 2023-11-10 PROCEDURE — 1101F PR PT FALLS ASSESS DOC 0-1 FALLS W/OUT INJ PAST YR: ICD-10-PCS | Mod: HCNC,CPTII,S$GLB, | Performed by: INTERNAL MEDICINE

## 2023-11-10 PROCEDURE — 3288F PR FALLS RISK ASSESSMENT DOCUMENTED: ICD-10-PCS | Mod: HCNC,CPTII,S$GLB, | Performed by: INTERNAL MEDICINE

## 2023-11-10 PROCEDURE — 3008F PR BODY MASS INDEX (BMI) DOCUMENTED: ICD-10-PCS | Mod: HCNC,CPTII,S$GLB, | Performed by: INTERNAL MEDICINE

## 2023-11-10 PROCEDURE — 3074F SYST BP LT 130 MM HG: CPT | Mod: HCNC,CPTII,S$GLB, | Performed by: INTERNAL MEDICINE

## 2023-11-10 PROCEDURE — 3078F PR MOST RECENT DIASTOLIC BLOOD PRESSURE < 80 MM HG: ICD-10-PCS | Mod: HCNC,CPTII,S$GLB, | Performed by: INTERNAL MEDICINE

## 2023-11-10 PROCEDURE — 1101F PT FALLS ASSESS-DOCD LE1/YR: CPT | Mod: HCNC,CPTII,S$GLB, | Performed by: INTERNAL MEDICINE

## 2023-11-10 PROCEDURE — 1126F PR PAIN SEVERITY QUANTIFIED, NO PAIN PRESENT: ICD-10-PCS | Mod: HCNC,CPTII,S$GLB, | Performed by: INTERNAL MEDICINE

## 2023-11-10 PROCEDURE — 3288F FALL RISK ASSESSMENT DOCD: CPT | Mod: HCNC,CPTII,S$GLB, | Performed by: INTERNAL MEDICINE

## 2023-11-10 NOTE — PROGRESS NOTES
"Subjective:       Patient ID: Hugh Bates is a 68 y.o. male.    Chief Complaint: Annual Exam    Here for annual exam    Patient presents today for routine evaluation, physical, and labs. Patient has no major concerns or complaints today.     Recurrent right thumb pain that radiates to forearm for the past month.    Left hamstring/gluteal pain after hiking in [place].    He eats "all" foods that are high potassium containing foods.    Amenable to coronary calcium scan for elevated ASCVD and LDL. Not really of fan of being on a statin.            Review of Systems   Constitutional:  Negative for appetite change, chills, fever and unexpected weight change.   HENT:  Negative for hearing loss, sore throat and trouble swallowing.    Eyes:  Negative for visual disturbance.   Respiratory:  Negative for cough, chest tightness and shortness of breath.    Cardiovascular:  Negative for chest pain and leg swelling.   Gastrointestinal:  Negative for abdominal pain, blood in stool, constipation, diarrhea, nausea and vomiting.   Endocrine: Negative for polydipsia and polyuria.   Genitourinary:  Negative for decreased urine volume, difficulty urinating, dysuria, frequency and urgency.   Musculoskeletal:  Positive for arthralgias. Negative for gait problem.   Skin:  Negative for rash.   Neurological:  Negative for dizziness and numbness.   Psychiatric/Behavioral:  The patient is not nervous/anxious.        Objective:      Vitals:    11/10/23 0906   BP: 120/70   Pulse: 77   SpO2: 98%   Weight: 70.6 kg (155 lb 10.3 oz)   Height: 5' 9" (1.753 m)      Physical Exam  Vitals and nursing note reviewed.   Constitutional:       General: He is not in acute distress.     Appearance: Normal appearance. He is well-developed.   HENT:      Head: Normocephalic and atraumatic.      Mouth/Throat:      Pharynx: No oropharyngeal exudate.   Eyes:      General: No scleral icterus.     Conjunctiva/sclera: Conjunctivae normal.      Pupils: " Pupils are equal, round, and reactive to light.   Neck:      Thyroid: No thyromegaly.   Cardiovascular:      Rate and Rhythm: Normal rate and regular rhythm.      Heart sounds: Normal heart sounds. No murmur heard.  Pulmonary:      Effort: Pulmonary effort is normal.      Breath sounds: Normal breath sounds. No wheezing or rales.   Abdominal:      General: There is no distension.   Musculoskeletal:      Right wrist: Tenderness present. No swelling, deformity, effusion, bony tenderness or snuff box tenderness. Normal pulse.   Lymphadenopathy:      Cervical: No cervical adenopathy.   Skin:     General: Skin is warm and dry.   Neurological:      Mental Status: He is alert and oriented to person, place, and time.   Psychiatric:         Behavior: Behavior normal.         Assessment:       1. Annual physical exam    2. Encounter for screening for cardiovascular disorders    3. Hypercholesteremia    4. Leukopenia, unspecified type        Plan:       Hugh was seen today for annual exam.    Diagnoses and all orders for this visit:    Annual physical exam   Annual labs done prior to appointment. We reviewed labs together. We discussed routine age appropriate cancer screening guidelines along with vaccination schedules and their risk and benefits. A complete ROS was performed and patient had an opportunity to ask questions and be provided answers to the best of my knowledge.     Encounter for screening for cardiovascular disorders  -     CT Cardiac Scoring; Future    Hypercholesteremia   Rec statin  Leukopenia, unspecified type   stable         Joseph Strong MD  Internal Medicine-[facility]        Side effects of medication(s) were discussed in detail and patient voiced understanding.  Patient will call back for any issues or complications.

## 2023-11-20 ENCOUNTER — HOSPITAL ENCOUNTER (OUTPATIENT)
Dept: RADIOLOGY | Facility: OTHER | Age: 68
Discharge: HOME | End: 2023-11-20
Attending: INTERNAL MEDICINE
Payer: MEDICARE

## 2023-11-20 DIAGNOSIS — Z13.6 ENCOUNTER FOR SCREENING FOR CARDIOVASCULAR DISORDERS: ICD-10-CM

## 2023-11-20 PROCEDURE — 75571 CT CALCIUM SCORING CARDIAC: ICD-10-PCS | Mod: 26,HCNC,, | Performed by: RADIOLOGY

## 2023-11-20 PROCEDURE — 75571 CT HRT W/O DYE W/CA TEST: CPT | Mod: 26,HCNC,, | Performed by: RADIOLOGY

## 2023-11-20 PROCEDURE — 75571 CT HRT W/O DYE W/CA TEST: CPT | Mod: TC,HCNC

## 2023-11-21 NOTE — PROGRESS NOTES
"Your total calcium score is 53.  Low to Moderate coronary heart disease risk. Your LDL or "bad cholesterol" is elevated which places you at increased risk for heart attacks and strokes.  I recommend a low cholesterol, low carbohydrate diet and exercise to achieve weight loss. In addition to this I would like to start you on a medication called atorvastatin 20mg one tablet daily, which is very effective at lowering your cholesterol. If you are amenable to this plan please let me know and I will send in a prescription to your pharmacy. If while taking you develop severe, frequent muscle aches please stop the medication and notify me or my staff electronically or by phone.    "

## 2024-02-06 ENCOUNTER — OFFICE VISIT (OUTPATIENT)
Dept: OPTOMETRY | Facility: CLINIC | Age: 69
End: 2024-02-06
Payer: MEDICARE

## 2024-02-06 ENCOUNTER — TELEPHONE (OUTPATIENT)
Dept: OPHTHALMOLOGY | Facility: CLINIC | Age: 69
End: 2024-02-06
Payer: MEDICARE

## 2024-02-06 DIAGNOSIS — H33.22 RETINAL DETACHMENT, LEFT: Primary | ICD-10-CM

## 2024-02-06 DIAGNOSIS — H33.002 RETINAL DETACHMENT, RHEGMATOGENOUS, LEFT EYE: Primary | ICD-10-CM

## 2024-02-06 PROCEDURE — 1159F MED LIST DOCD IN RCRD: CPT | Mod: HCNC,CPTII,S$GLB, | Performed by: OPTOMETRIST

## 2024-02-06 PROCEDURE — 1126F AMNT PAIN NOTED NONE PRSNT: CPT | Mod: HCNC,CPTII,S$GLB, | Performed by: OPTOMETRIST

## 2024-02-06 PROCEDURE — 1160F RVW MEDS BY RX/DR IN RCRD: CPT | Mod: HCNC,CPTII,S$GLB, | Performed by: OPTOMETRIST

## 2024-02-06 PROCEDURE — 92134 CPTRZ OPH DX IMG PST SGM RTA: CPT | Mod: HCNC,S$GLB,, | Performed by: OPTOMETRIST

## 2024-02-06 PROCEDURE — 99214 OFFICE O/P EST MOD 30 MIN: CPT | Mod: HCNC,S$GLB,, | Performed by: OPTOMETRIST

## 2024-02-06 PROCEDURE — 99999 PR PBB SHADOW E&M-EST. PATIENT-LVL III: CPT | Mod: PBBFAC,HCNC,, | Performed by: OPTOMETRIST

## 2024-02-06 PROCEDURE — 1101F PT FALLS ASSESS-DOCD LE1/YR: CPT | Mod: HCNC,CPTII,S$GLB, | Performed by: OPTOMETRIST

## 2024-02-06 PROCEDURE — 3288F FALL RISK ASSESSMENT DOCD: CPT | Mod: HCNC,CPTII,S$GLB, | Performed by: OPTOMETRIST

## 2024-02-06 NOTE — TELEPHONE ENCOUNTER
----- Message from Raphael Lloyd sent at 2/6/2024 12:25 PM CST -----  Consult/Advisory    Name Of Caller:Hugh       Contact Preference:[phone]    Nature of call: Ptn calling back regarding a right eye injury he is asking if he can be seen today

## 2024-02-06 NOTE — PROGRESS NOTES
Subjective:       Patient ID: Hugh Bates is a 68 y.o. male      Chief Complaint   Patient presents with    Eye Problem     History of Present Illness   Dls: 3/30/23 Dr. Fuller     69 y/o male presents today with c/o x 1 day when playing pickle ball shadows nasal os flashes os blurry vision nasally os no pain no ha's.     Eye meds  Otc gtts ou prn          Assessment/Plan:     1. Retinal detachment, left  Mac off RD OS x 1 day. Discussed with pt. See retina tomorrow morning at 8am, no eating after midnight in case pt is sent for same day surgery.    - Ambulatory referral/consult to Ophthalmology    Follow up in about 1 day (around 2/7/2024) for retina as scheduled.

## 2024-02-07 ENCOUNTER — ANESTHESIA (OUTPATIENT)
Dept: SURGERY | Facility: HOSPITAL | Age: 69
End: 2024-02-07
Payer: MEDICARE

## 2024-02-07 ENCOUNTER — ANESTHESIA EVENT (OUTPATIENT)
Dept: SURGERY | Facility: HOSPITAL | Age: 69
End: 2024-02-07
Payer: MEDICARE

## 2024-02-07 ENCOUNTER — HOSPITAL ENCOUNTER (OUTPATIENT)
Facility: HOSPITAL | Age: 69
Discharge: HOME | End: 2024-02-07
Attending: OPHTHALMOLOGY | Admitting: OPHTHALMOLOGY
Payer: MEDICARE

## 2024-02-07 VITALS
RESPIRATION RATE: 16 BRPM | HEART RATE: 49 BPM | DIASTOLIC BLOOD PRESSURE: 64 MMHG | SYSTOLIC BLOOD PRESSURE: 133 MMHG | TEMPERATURE: 98 F | WEIGHT: 155.63 LBS | OXYGEN SATURATION: 99 % | BODY MASS INDEX: 23.05 KG/M2 | HEIGHT: 69 IN

## 2024-02-07 DIAGNOSIS — H33.022 RETINAL DETACHMENT OF LEFT EYE WITH MULTIPLE BREAKS: Primary | ICD-10-CM

## 2024-02-07 DIAGNOSIS — H33.22 RETINAL DETACHMENT, LEFT: ICD-10-CM

## 2024-02-07 PROCEDURE — 67108 REPAIR DETACHED RETINA: CPT | Mod: HCNC,LT,, | Performed by: OPHTHALMOLOGY

## 2024-02-07 PROCEDURE — 25000003 PHARM REV CODE 250: Performed by: NURSE ANESTHETIST, CERTIFIED REGISTERED

## 2024-02-07 PROCEDURE — 27201423 OPTIME MED/SURG SUP & DEVICES STERILE SUPPLY: Mod: HCNC | Performed by: OPHTHALMOLOGY

## 2024-02-07 PROCEDURE — 36000706: Mod: HCNC | Performed by: OPHTHALMOLOGY

## 2024-02-07 PROCEDURE — 37000009 HC ANESTHESIA EA ADD 15 MINS: Mod: HCNC | Performed by: OPHTHALMOLOGY

## 2024-02-07 PROCEDURE — 36000707: Mod: HCNC | Performed by: OPHTHALMOLOGY

## 2024-02-07 PROCEDURE — 63600175 PHARM REV CODE 636 W HCPCS: Performed by: OPHTHALMOLOGY

## 2024-02-07 PROCEDURE — 99499 UNLISTED E&M SERVICE: CPT | Mod: HCNC,,, | Performed by: STUDENT IN AN ORGANIZED HEALTH CARE EDUCATION/TRAINING PROGRAM

## 2024-02-07 PROCEDURE — D9220A PRA ANESTHESIA: Mod: ANES,,, | Performed by: STUDENT IN AN ORGANIZED HEALTH CARE EDUCATION/TRAINING PROGRAM

## 2024-02-07 PROCEDURE — 63600175 PHARM REV CODE 636 W HCPCS: Performed by: NURSE ANESTHETIST, CERTIFIED REGISTERED

## 2024-02-07 PROCEDURE — D9220A PRA ANESTHESIA: Mod: CRNA,,, | Performed by: NURSE ANESTHETIST, CERTIFIED REGISTERED

## 2024-02-07 PROCEDURE — 37000008 HC ANESTHESIA 1ST 15 MINUTES: Mod: HCNC | Performed by: OPHTHALMOLOGY

## 2024-02-07 PROCEDURE — 71000044 HC DOSC ROUTINE RECOVERY FIRST HOUR: Mod: HCNC | Performed by: OPHTHALMOLOGY

## 2024-02-07 PROCEDURE — C1784 OCULAR DEV, INTRAOP, DET RET: HCPCS | Mod: HCNC | Performed by: OPHTHALMOLOGY

## 2024-02-07 PROCEDURE — 25000003 PHARM REV CODE 250: Performed by: OPHTHALMOLOGY

## 2024-02-07 PROCEDURE — 71000015 HC POSTOP RECOV 1ST HR: Mod: HCNC | Performed by: OPHTHALMOLOGY

## 2024-02-07 PROCEDURE — 25000003 PHARM REV CODE 250: Performed by: STUDENT IN AN ORGANIZED HEALTH CARE EDUCATION/TRAINING PROGRAM

## 2024-02-07 RX ORDER — NEOMYCIN SULFATE, POLYMYXIN B SULFATE, AND DEXAMETHASONE 3.5; 10000; 1 MG/G; [USP'U]/G; MG/G
OINTMENT OPHTHALMIC
Status: DISCONTINUED | OUTPATIENT
Start: 2024-02-07 | End: 2024-02-07 | Stop reason: HOSPADM

## 2024-02-07 RX ORDER — FENTANYL CITRATE 50 UG/ML
25 INJECTION, SOLUTION INTRAMUSCULAR; INTRAVENOUS EVERY 5 MIN PRN
Status: DISCONTINUED | OUTPATIENT
Start: 2024-02-07 | End: 2024-02-07 | Stop reason: HOSPADM

## 2024-02-07 RX ORDER — LIDOCAINE HYDROCHLORIDE 20 MG/ML
INJECTION, SOLUTION EPIDURAL; INFILTRATION; INTRACAUDAL; PERINEURAL
Status: DISCONTINUED
Start: 2024-02-07 | End: 2024-02-07 | Stop reason: HOSPADM

## 2024-02-07 RX ORDER — FENTANYL CITRATE 50 UG/ML
INJECTION, SOLUTION INTRAMUSCULAR; INTRAVENOUS
Status: DISCONTINUED | OUTPATIENT
Start: 2024-02-07 | End: 2024-02-07

## 2024-02-07 RX ORDER — HALOPERIDOL 5 MG/ML
0.5 INJECTION INTRAMUSCULAR EVERY 10 MIN PRN
Status: DISCONTINUED | OUTPATIENT
Start: 2024-02-07 | End: 2024-02-07 | Stop reason: HOSPADM

## 2024-02-07 RX ORDER — VANCOMYCIN HYDROCHLORIDE 500 MG/10ML
INJECTION, POWDER, LYOPHILIZED, FOR SOLUTION INTRAVENOUS
Status: DISCONTINUED
Start: 2024-02-07 | End: 2024-02-07 | Stop reason: HOSPADM

## 2024-02-07 RX ORDER — SODIUM CHLORIDE 0.9 % (FLUSH) 0.9 %
10 SYRINGE (ML) INJECTION
Status: DISCONTINUED | OUTPATIENT
Start: 2024-02-07 | End: 2024-02-07 | Stop reason: HOSPADM

## 2024-02-07 RX ORDER — HYDROCODONE BITARTRATE AND ACETAMINOPHEN 5; 325 MG/1; MG/1
1 TABLET ORAL EVERY 4 HOURS PRN
Status: DISCONTINUED | OUTPATIENT
Start: 2024-02-07 | End: 2024-02-07 | Stop reason: HOSPADM

## 2024-02-07 RX ORDER — LIDOCAINE HYDROCHLORIDE 20 MG/ML
INJECTION INTRAVENOUS
Status: DISCONTINUED | OUTPATIENT
Start: 2024-02-07 | End: 2024-02-07

## 2024-02-07 RX ORDER — OXYCODONE AND ACETAMINOPHEN 5; 325 MG/1; MG/1
1 TABLET ORAL EVERY 6 HOURS PRN
Qty: 12 TABLET | Refills: 0 | Status: SHIPPED | OUTPATIENT
Start: 2024-02-07 | End: 2024-02-12

## 2024-02-07 RX ORDER — ATROPINE SULFATE 10 MG/ML
1 SOLUTION/ DROPS OPHTHALMIC
Status: DISCONTINUED | OUTPATIENT
Start: 2024-02-07 | End: 2024-02-07 | Stop reason: HOSPADM

## 2024-02-07 RX ORDER — SODIUM CHLORIDE 0.9 % (FLUSH) 0.9 %
3 SYRINGE (ML) INJECTION EVERY 4 HOURS PRN
Status: DISCONTINUED | OUTPATIENT
Start: 2024-02-07 | End: 2024-02-07 | Stop reason: HOSPADM

## 2024-02-07 RX ORDER — NEOMYCIN SULFATE, POLYMYXIN B SULFATE, AND DEXAMETHASONE 3.5; 10000; 1 MG/G; [USP'U]/G; MG/G
OINTMENT OPHTHALMIC
Status: DISCONTINUED
Start: 2024-02-07 | End: 2024-02-07 | Stop reason: HOSPADM

## 2024-02-07 RX ORDER — PHENYLEPHRINE HYDROCHLORIDE 25 MG/ML
1 SOLUTION/ DROPS OPHTHALMIC
Status: DISCONTINUED | OUTPATIENT
Start: 2024-02-07 | End: 2024-02-07 | Stop reason: HOSPADM

## 2024-02-07 RX ORDER — PREDNISOLONE ACETATE 10 MG/ML
1 SUSPENSION/ DROPS OPHTHALMIC
Status: DISCONTINUED | OUTPATIENT
Start: 2024-02-07 | End: 2024-02-07 | Stop reason: HOSPADM

## 2024-02-07 RX ORDER — PROPOFOL 10 MG/ML
VIAL (ML) INTRAVENOUS
Status: DISCONTINUED | OUTPATIENT
Start: 2024-02-07 | End: 2024-02-07

## 2024-02-07 RX ORDER — TETRACAINE HYDROCHLORIDE 5 MG/ML
1 SOLUTION OPHTHALMIC
Status: DISCONTINUED | OUTPATIENT
Start: 2024-02-07 | End: 2024-02-07 | Stop reason: HOSPADM

## 2024-02-07 RX ORDER — EPINEPHRINE 1 MG/ML
INJECTION, SOLUTION, CONCENTRATE INTRAVENOUS
Status: DISCONTINUED
Start: 2024-02-07 | End: 2024-02-07 | Stop reason: HOSPADM

## 2024-02-07 RX ORDER — LIDOCAINE HYDROCHLORIDE 20 MG/ML
INJECTION, SOLUTION EPIDURAL; INFILTRATION; INTRACAUDAL; PERINEURAL
Status: DISCONTINUED | OUTPATIENT
Start: 2024-02-07 | End: 2024-02-07 | Stop reason: HOSPADM

## 2024-02-07 RX ORDER — EPINEPHRINE 1 MG/ML
INJECTION, SOLUTION, CONCENTRATE INTRAVENOUS
Status: DISCONTINUED | OUTPATIENT
Start: 2024-02-07 | End: 2024-02-07 | Stop reason: HOSPADM

## 2024-02-07 RX ORDER — MOXIFLOXACIN 5 MG/ML
1 SOLUTION/ DROPS OPHTHALMIC
Status: DISCONTINUED | OUTPATIENT
Start: 2024-02-07 | End: 2024-02-07 | Stop reason: HOSPADM

## 2024-02-07 RX ORDER — DEXAMETHASONE SODIUM PHOSPHATE 4 MG/ML
INJECTION, SOLUTION INTRA-ARTICULAR; INTRALESIONAL; INTRAMUSCULAR; INTRAVENOUS; SOFT TISSUE
Status: DISCONTINUED
Start: 2024-02-07 | End: 2024-02-07 | Stop reason: HOSPADM

## 2024-02-07 RX ORDER — INDOCYANINE GREEN AND WATER 25 MG
KIT INJECTION
Status: DISCONTINUED
Start: 2024-02-07 | End: 2024-02-07 | Stop reason: HOSPADM

## 2024-02-07 RX ORDER — BUPIVACAINE HYDROCHLORIDE 7.5 MG/ML
INJECTION, SOLUTION EPIDURAL; RETROBULBAR
Status: DISCONTINUED
Start: 2024-02-07 | End: 2024-02-07 | Stop reason: HOSPADM

## 2024-02-07 RX ORDER — ACETAMINOPHEN 325 MG/1
650 TABLET ORAL EVERY 4 HOURS PRN
Status: DISCONTINUED | OUTPATIENT
Start: 2024-02-07 | End: 2024-02-07 | Stop reason: HOSPADM

## 2024-02-07 RX ORDER — ONDANSETRON 4 MG/1
4 TABLET, FILM COATED ORAL EVERY 8 HOURS PRN
Qty: 12 TABLET | Refills: 0 | Status: SHIPPED | OUTPATIENT
Start: 2024-02-07 | End: 2024-02-12

## 2024-02-07 RX ORDER — MIDAZOLAM HYDROCHLORIDE 1 MG/ML
INJECTION, SOLUTION INTRAMUSCULAR; INTRAVENOUS
Status: DISCONTINUED | OUTPATIENT
Start: 2024-02-07 | End: 2024-02-07

## 2024-02-07 RX ADMIN — ATROPINE SULFATE 1 DROP: 10 SOLUTION/ DROPS OPHTHALMIC at 10:02

## 2024-02-07 RX ADMIN — PREDNISOLONE ACETATE 1 DROP: 10 SUSPENSION/ DROPS OPHTHALMIC at 10:02

## 2024-02-07 RX ADMIN — MIDAZOLAM HYDROCHLORIDE 1 MG: 1 INJECTION, SOLUTION INTRAMUSCULAR; INTRAVENOUS at 10:02

## 2024-02-07 RX ADMIN — MOXIFLOXACIN OPHTHALMIC 1 DROP: 5 SOLUTION/ DROPS OPHTHALMIC at 10:02

## 2024-02-07 RX ADMIN — FENTANYL CITRATE 25 MCG: 50 INJECTION, SOLUTION INTRAMUSCULAR; INTRAVENOUS at 11:02

## 2024-02-07 RX ADMIN — FENTANYL CITRATE 25 MCG: 50 INJECTION, SOLUTION INTRAMUSCULAR; INTRAVENOUS at 10:02

## 2024-02-07 RX ADMIN — PHENYLEPHRINE HYDROCHLORIDE 1 DROP: 25 SOLUTION/ DROPS OPHTHALMIC at 10:02

## 2024-02-07 RX ADMIN — LIDOCAINE HYDROCHLORIDE 40 MG: 20 INJECTION INTRAVENOUS at 10:02

## 2024-02-07 RX ADMIN — SODIUM CHLORIDE: 0.9 INJECTION, SOLUTION INTRAVENOUS at 10:02

## 2024-02-07 RX ADMIN — PROPOFOL 30 MG: 10 INJECTION, EMULSION INTRAVENOUS at 10:02

## 2024-02-07 RX ADMIN — TETRACAINE HYDROCHLORIDE 1 DROP: 5 SOLUTION/ DROPS OPHTHALMIC at 10:02

## 2024-02-07 RX ADMIN — MIDAZOLAM HYDROCHLORIDE 1 MG: 1 INJECTION, SOLUTION INTRAMUSCULAR; INTRAVENOUS at 11:02

## 2024-02-07 NOTE — PLAN OF CARE
Plan of care reviewed with pt and spouse, patient progressing with POC, VSS, tolerating PO, no complaints of nausea or pain. Discharge instructions reviewed, pt verbalized understanding, questions answered. Pt ready for DC.

## 2024-02-07 NOTE — DISCHARGE SUMMARY
[facility] - Surgery (1st Fl)  Discharge Note  Short Stay    Procedure(s) (LRB):  REPAIR, RETINAL DETACHMENT, WITH VITRECTOMY (Left)      OUTCOME: Patient tolerated treatment/procedure well without complication and is now ready for discharge.    DISPOSITION: Home or Self Care    FINAL DIAGNOSIS:  RRD OS with multiple breaks - macula involving    FOLLOWUP: In clinic    DISCHARGE INSTRUCTIONS:  No discharge procedures on file.     TIME SPENT ON DISCHARGE:    minutes

## 2024-02-07 NOTE — DISCHARGE INSTRUCTIONS
Post Op Instructions:  Patient should Maintain Eye shield & Dressing until seen tomorrow in eye clinic  Tylenol as needed for general discomfort  Use Prescription for pain medication if pain is severe  Use Prescription for Nausea (Zofran) if nausea or vomiting  No excessive exercise   No Bending, Lifting or Straining  Call MD if significant pain or nausea / vomiting uncontrolled by medications  Call MD if temperature in excess of 101' F  Maintain Head in a Face-Down Position or on right side position.  AVOID SLEEPING flat on back   Return to eye clinic for Post Op Examination tomorrow Morning.  Bring Medicine bag to tomorrow's appointment.

## 2024-02-07 NOTE — TRANSFER OF CARE
"Anesthesia Transfer of Care Note    Patient: Hugh Bates    Procedure(s) Performed: Procedure(s) (LRB):  REPAIR, RETINAL DETACHMENT, WITH VITRECTOMY (Left)    Patient location: PACU    Anesthesia Type: MAC    Transport from OR: Transported from OR on room air with adequate spontaneous ventilation    Post pain: adequate analgesia    Post assessment: tolerated procedure well and no apparent anesthetic complications    Post vital signs: stable    Level of consciousness: awake, alert and oriented    Nausea/Vomiting: no nausea/vomiting    Complications: none    Transfer of care protocol was followed      Last vitals: Visit Vitals  BP (!) 166/90 (BP Location: Left arm, Patient Position: Sitting)   Pulse (!) 51   Temp 36.6 °C (97.9 °F) (Temporal)   Resp 16   Ht 5' 9" (1.753 m)   Wt 70.6 kg (155 lb 10.3 oz)   SpO2 99%   BMI 22.98 kg/m²     "

## 2024-02-07 NOTE — ANESTHESIA POSTPROCEDURE EVALUATION
Anesthesia Post Evaluation    Patient: Hugh Bates    Procedure(s) Performed: Procedure(s) (LRB):  REPAIR, RETINAL DETACHMENT, WITH VITRECTOMY (Left)    Final Anesthesia Type: MAC      Patient location during evaluation: PACU  Patient participation: Yes- Able to Participate  Level of consciousness: awake and alert  Post-procedure vital signs: reviewed and stable  Pain management: adequate  Airway patency: patent    PONV status at discharge: No PONV  Anesthetic complications: no      Cardiovascular status: blood pressure returned to baseline  Respiratory status: unassisted  Hydration status: euvolemic  Follow-up not needed.              Vitals Value Taken Time   /64 02/07/24 1245   Temp 36.8 °C (98.2 °F) 02/07/24 1210   Pulse 49 02/07/24 1245   Resp 16 02/07/24 1245   SpO2 99 % 02/07/24 1245         No case tracking events are documented in the log.      Pain/Caryl Score: Caryl Score: 10 (2/7/2024 12:45 PM)

## 2024-02-07 NOTE — BRIEF OP NOTE
Preoperative diagnosis: RRD multiple breaks including large ST tear with scrolled retina,  macula involving OS    Post op Dx: same    Procedure performed:  25g PPV/AFx/EL/C3F8 14% OS    Attending Surgeon: Diana    Assistant Surgeon: Festus    Anesthesia: Local/Mac, retrobulbar injection of 4.0cc mixture 0.75%Marcaine, 2% Xylocaine    Estimated blood loss: Minimal    Complication: None    Specimen: None    Disposition: Stable to recovery    Findings/Outcome: ST macula involving detachment with multiple breaks, including large ST tear    Date of Discharge: 2/7/24    Discharge Disposition: stable to recovery then home    F/U: tomorrow

## 2024-02-07 NOTE — ANESTHESIA PREPROCEDURE EVALUATION
Pre-operative evaluation for Procedure(s) (LRB):  REPAIR, RETINAL DETACHMENT, WITH VITRECTOMY (Left)    Hugh Bates is a 68 y.o. male who presents with a retinal detachment. Plan for the above procedure.     Patient Active Problem List   Diagnosis    Gastroesophageal reflux disease without esophagitis    Hepatitis C antibody test positive in 2017    Hypercholesteremia    DDD (degenerative disc disease), lumbar    Leukopenia        No current facility-administered medications on file prior to encounter.     Current Outpatient Medications on File Prior to Encounter   Medication Sig Dispense Refill    collagen, hydrolysate, bovine, (COLLAGEN, HYDR, BOVINE,, BULK,) 100 % Powd       fish,bora,flax oils-om3,6,9no1 400-400-400 mg Cap Take by mouth.      glucosamine-chondroitin 500-400 mg tablet Take 1 tablet by mouth 3 (three) times daily.      loratadine (CLARITIN) 10 mg tablet       multivitamin (THERAGRAN) per tablet Take 1 tablet by mouth once daily.      PNEUMOVAX-23 25 mcg/0.5 mL vaccine       RSVPreF3 antigen-AS01E, PF, (AREXVY) 120 mcg/0.5 mL SusR vaccine Inject 0.5mL into the muscle once 0.5 mL 0       Past Surgical History:   Procedure Laterality Date    COLONOSCOPY N/A 9/30/2016    Procedure: COLONOSCOPY;  Surgeon: George Virgen MD;  Location: [address]);  Service: Colon and Rectal;  Laterality: N/A;    COLONOSCOPY      FRACTURE SURGERY  1974    broken thumb           Pre-op Assessment    I have reviewed the Patient Summary Reports.     I have reviewed the Nursing Notes. I have reviewed the NPO Status.   I have reviewed the Medications.     Review of Systems  Anesthesia Hx:    System negative unless otherwise specified in the HPI or problem list above           Denies Family Hx of Anesthesia complications.    Denies Personal Hx of Anesthesia complications.                    Hematology/Oncology:                   Hematology Comments: System negative unless otherwise specified in the HPI or  problem list above                Oncology Comments: System negative unless otherwise specified in the HPI or problem list above     EENT/Dental:   System negative unless otherwise specified in the HPI or problem list above          Cardiovascular:                    System negative unless otherwise specified in the HPI or problem list above                         Pulmonary:         System negative unless otherwise specified in the HPI or problem list above               Renal/:     System negative unless otherwise specified in the HPI or problem list above             Hepatic/GI:        System negative unless otherwise specified in the HPI or problem list above          Musculoskeletal:     System negative unless otherwise specified in the HPI or problem list above            OB/GYN/PEDS:          System negative unless otherwise specified in the HPI or problem list above   Neurological:           System negative unless otherwise specified in the HPI or problem list above                            Endocrine:     System negative unless otherwise specified in the HPI or problem list above        Dermatological:  System negative unless otherwise specified in the HPI or problem list above   Psych:     System negative unless otherwise specified in the HPI or problem list above               Physical Exam  General: Well nourished, Cooperative, Alert and Oriented    Airway:  Mouth Opening: Normal  TM Distance: Normal  Tongue: Normal  Neck ROM: Normal ROM    Chest/Lungs:  Clear to auscultation, Normal Respiratory Rate    Heart:  Rate: Normal  Rhythm: Regular Rhythm  Sounds: Normal        Anesthesia Plan  Type of Anesthesia, risks & benefits discussed:    Anesthesia Type: MAC, Gen Natural Airway, Gen ETT  Intra-op Monitoring Plan: Standard ASA Monitors  Post Op Pain Control Plan: multimodal analgesia  Induction:  IV  Informed Consent: Informed consent signed with the Patient and all parties understand the risks and  agree with anesthesia plan.  All questions answered.   ASA Score: 2  Day of Surgery Review of History & Physical: H&P Update referred to the surgeon/provider.    Ready For Surgery From Anesthesia Perspective.     .

## 2024-02-07 NOTE — OP NOTE
Preoperative diagnosis: RRD multiple breaks including large ST tear with scrolled retina,  macula involving OS    Post op Dx: same    Procedure performed:  25g PPV/AFx/EL/C3F8 14% OS    Attending surgeons:  JOHN Ortiz    Assistant Surgeon:Andrei Mortensen    Anesthesia:  Local MAC with retrobulbar injection 4.0cc mixture of 2% lidocaine, 0.75% marcaine    Estimated blood loss: minimal    Complications:  None    DOS: 2/7/24    Disposition: stable to recovery then home    Indications for surgery:   This is a 69yo patient who noted progressive peripheral vision loss affecting the central vision worsening over last week.  Decision was made to take the patient to surgery to repair the RD, prevent further vision loss and hopefully improved some vision.  Risks, benefits, and alternatives to surgery were discussed in detail. Risks including loss of vision, loss of eye, retina detachment, hemorrhage, infection, lens dislocation, glaucoma, hypotony, ptosis, diplopia    Description of procedure:  After proper informed consent was obtained, the patient was brought back to the operating room at [facility] where monitored anesthesia care was induced.  A retrobulbar injection was provided above without complication.  The patient is prepped draped in normal sterile fashion for ophthalmic surgery and a lid speculum was placed in the left eye.  A standard 3 port 25 gauge pars plana vitrectomy setup with the infusion cannula inserted 4.0 mm posterior to the limbus.  The infusion cannula was turned on after it was observed free and clear of all underlying tissue.  Super nasal and superotemporal trocars were also placed  4.0 mm posterior to the limbus.  The vitrector and light pipe were introduced in the vitreous cavity and a core vitrectomy was performed.   A 360 degree cortical vitrectomy was performed.  Care was taken while removing the vitreous from around the retina breaks superotemporally.  The edges of the break  were marked with diathermy.  A posterior retinotomy was created superotemporally.  An air fluid exchange was performed.  The retina flattened nicely following this maneuver.  Endolaser was applied around the primary breaks and retinotomy.   The SN trocar was removed and not leaking after gentle massage. A C3F8  gas mixture was created.  Approximately 40 cc were cycled through the eye.  The superotemporal and infusion trocars removed and not leaking after gentle massage.  The eye was normal pressure via palpation.  Subconjunctival injections of vancomycin and Decadron were given and  the patient's the drapes removed. the patient was washed free of Betadine prep solution,  ointment was placed in the eye then patched shielded, mac anesthesia was reversed and he was brought to recovery in stable condition tolerating the procedure well.  Dr. Ortiz was present for in entire case

## 2024-02-07 NOTE — H&P
Pre-Operative History & Physical  Ophthalmology      SUBJECTIVE:     History of Present Illness:  Patient is a 68 y.o. 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    MEDICATIONS:   PTA Medications   Medication Sig    collagen, hydrolysate, bovine, (COLLAGEN, HYDR, BOVINE,, BULK,) 100 % Powd     fish,bora,flax oils-om3,6,9no1 400-400-400 mg Cap Take by mouth.    glucosamine-chondroitin 500-400 mg tablet Take 1 tablet by mouth 3 (three) times daily.    loratadine (CLARITIN) 10 mg tablet     multivitamin (THERAGRAN) per tablet Take 1 tablet by mouth once daily.    PNEUMOVAX-23 25 mcg/0.5 mL vaccine     RSVPreF3 antigen-AS01E, PF, (AREXVY) 120 mcg/0.5 mL SusR vaccine Inject 0.5mL into the muscle once       ALLERGIES: Review of patient's allergies indicates:  No Known Allergies    PAST MEDICAL HISTORY:   Past Medical History:   Diagnosis Date    Basal cell carcinoma     Urinary tract infection      PAST SURGICAL HISTORY:   Past Surgical History:   Procedure Laterality Date    COLONOSCOPY N/A 2016    Procedure: COLONOSCOPY;  Surgeon: George Virgen MD;  Location: [facility] ([address] [place] [address]);  Service: Colon and Rectal;  Laterality: N/A;    COLONOSCOPY      FRACTURE SURGERY      broken thumb     PAST FAMILY HISTORY:   Family History   Problem Relation Age of Onset    Colon polyps Mother     Squamous cell carcinoma Mother     Hearing loss Mother         Bad hearing, but will not get hearing aid    Cataracts Mother     Cancer Father          in     Early death Father         was 44 in , [place] POW    No Known Problems Sister     No Known Problems Sister     No Known Problems Sister     No Known Problems Brother     No Known Problems Maternal Aunt     No Known Problems Maternal Uncle     No Known Problems Paternal Aunt     No Known Problems Paternal Uncle     Arthritis Maternal Grandmother     No Known Problems Maternal Grandfather     No Known  Problems Paternal Grandmother     No Known Problems Paternal Grandfather     No Known Problems Other     Diabetes Neg Hx     Melanoma Neg Hx      SOCIAL HISTORY:   Social History     Tobacco Use    Smoking status: Never    Smokeless tobacco: Never   Substance Use Topics    Alcohol use: Yes     Alcohol/week: 7.0 standard drinks of alcohol     Types: 1 Glasses of wine, 6 Cans of beer per week    Drug use: Never        MENTAL STATUS: Alert    REVIEW OF SYSTEMS: Negative    OBJECTIVE:     Vital Signs (Most Recent)  Temp: 97.9 °F (36.6 °C) (02/07/24 1001)  Pulse: (!) 51 (02/07/24 1001)  Resp: 16 (02/07/24 1001)  BP: (!) 166/90 (02/07/24 1001)  SpO2: 99 % (02/07/24 1001)    Physical Exam:  General: NAD  HEENT: AT/NC  Lungs: Adequate respirations  Heart: + pulses  Abdomen: Soft    ASSESSMENT/PLAN:     Patient is a 68 y.o. 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 The patient/family voiced good understanding, the informed consent was signed, witnessed, and placed in chart. All patient and family questions were answered.   - Will proceed with PPV OS  - Plan for MAC with retrobulbar block anesthesia   - Allergies reviewed: Review of patient's allergies indicates:  No Known Allergies    Sunny Dhaliwal MD  [employer] Ophthalmology PGY-2

## 2024-02-08 ENCOUNTER — OFFICE VISIT (OUTPATIENT)
Dept: OPHTHALMOLOGY | Facility: CLINIC | Age: 69
End: 2024-02-08
Payer: MEDICARE

## 2024-02-08 DIAGNOSIS — H33.022 RETINAL DETACHMENT OF LEFT EYE WITH MULTIPLE BREAKS: Primary | ICD-10-CM

## 2024-02-08 PROCEDURE — 99999 PR PBB SHADOW E&M-EST. PATIENT-LVL II: CPT | Mod: PBBFAC,,, | Performed by: OPHTHALMOLOGY

## 2024-02-08 PROCEDURE — 3288F FALL RISK ASSESSMENT DOCD: CPT | Mod: CPTII,S$GLB,, | Performed by: OPHTHALMOLOGY

## 2024-02-08 PROCEDURE — 1159F MED LIST DOCD IN RCRD: CPT | Mod: CPTII,S$GLB,, | Performed by: OPHTHALMOLOGY

## 2024-02-08 PROCEDURE — 99024 POSTOP FOLLOW-UP VISIT: CPT | Mod: S$GLB,,, | Performed by: OPHTHALMOLOGY

## 2024-02-08 PROCEDURE — 1101F PT FALLS ASSESS-DOCD LE1/YR: CPT | Mod: CPTII,S$GLB,, | Performed by: OPHTHALMOLOGY

## 2024-02-08 NOTE — PROGRESS NOTES
HPI     POST OP      OS     1 DAY      Additional comments: POST OP   OS      RETINAL DETACHMENT OS   PPV     S/P  25G      Hm  os  harder to focus to discuss today is only 1 day post op  FOLLOW UP  DR HE ARVIZU  2 /12       Gtts     elkin   os  marleny/poly/dex  Moxi qid  os   Pred forte  qid os   Atopine qid os            Comments    Dls: 02/06/2024    69 y/o male presents today with c/o x 1 day when playing pickle ball   shadows nasal os flashes os blurry vision nasally os no pain no ha's.     Eye meds  Otc gtts ou prn       A/P    RRD OS  Macula involving with large ST tear    S/p 25g PPV/AFx/EL/C3F8 OS 2/7/24    Doing well, good IOP  Start gtts QID  Ointment/shield QHS    Sleep on right  Ok for face forward throughout day    2. NS OU  Will need CE eval OS soon after PPV        1 week

## 2024-02-12 ENCOUNTER — OFFICE VISIT (OUTPATIENT)
Dept: OPHTHALMOLOGY | Facility: CLINIC | Age: 69
End: 2024-02-12
Payer: MEDICARE

## 2024-02-12 DIAGNOSIS — Z98.890 S/P EYE SURGERY: ICD-10-CM

## 2024-02-12 DIAGNOSIS — H33.022 RETINAL DETACHMENT OF LEFT EYE WITH MULTIPLE BREAKS: Primary | ICD-10-CM

## 2024-02-12 PROCEDURE — 1101F PT FALLS ASSESS-DOCD LE1/YR: CPT | Mod: HCNC,CPTII,S$GLB, | Performed by: STUDENT IN AN ORGANIZED HEALTH CARE EDUCATION/TRAINING PROGRAM

## 2024-02-12 PROCEDURE — 3288F FALL RISK ASSESSMENT DOCD: CPT | Mod: HCNC,CPTII,S$GLB, | Performed by: STUDENT IN AN ORGANIZED HEALTH CARE EDUCATION/TRAINING PROGRAM

## 2024-02-12 PROCEDURE — 1159F MED LIST DOCD IN RCRD: CPT | Mod: HCNC,CPTII,S$GLB, | Performed by: STUDENT IN AN ORGANIZED HEALTH CARE EDUCATION/TRAINING PROGRAM

## 2024-02-12 PROCEDURE — 99999 PR PBB SHADOW E&M-EST. PATIENT-LVL III: CPT | Mod: PBBFAC,HCNC,, | Performed by: STUDENT IN AN ORGANIZED HEALTH CARE EDUCATION/TRAINING PROGRAM

## 2024-02-12 PROCEDURE — 99024 POSTOP FOLLOW-UP VISIT: CPT | Mod: HCNC,S$GLB,, | Performed by: STUDENT IN AN ORGANIZED HEALTH CARE EDUCATION/TRAINING PROGRAM

## 2024-02-12 PROCEDURE — 1126F AMNT PAIN NOTED NONE PRSNT: CPT | Mod: HCNC,CPTII,S$GLB, | Performed by: STUDENT IN AN ORGANIZED HEALTH CARE EDUCATION/TRAINING PROGRAM

## 2024-02-12 PROCEDURE — 1160F RVW MEDS BY RX/DR IN RCRD: CPT | Mod: HCNC,CPTII,S$GLB, | Performed by: STUDENT IN AN ORGANIZED HEALTH CARE EDUCATION/TRAINING PROGRAM

## 2024-02-12 NOTE — PROGRESS NOTES
[facility] Progress Note    Encounter Date: 2/12/2024    Subjective:     Chief Complaint   Patient presents with    Post-op Evaluation    Dls: 02/08//2024    1wk post op PPV OS    Eye meds  Otc gtts ou prn         Objective:     Visual Acuity (Snellen - Linear)         Right Left    Dist sc  HM          Main Ophthalmology Exam       External Exam         Right Left    External Normal Normal              Slit Lamp Exam         Right Left    Lids/Lashes Normal Normal    Conjunctiva/Sclera White and quiet Expected post surgical appearance, BARON, sclerotomies closed without leak    Cornea Clear Clear    Anterior Chamber Deep and quiet Deep and quiet    Iris Round and reactive Round and pharm dilated    Lens NS Nuclear sclerosis    Anterior Vitreous Normal C3F8              Fundus Exam         Right Left    Posterior Vitreous  ~70% gas fill    Disc Peripapillary atrophy Peripapillary atrophy    C/D Ratio 0.1 0.1    Macula Normal flat    Vessels Normal Normal    Periphery Normal good laser around large break and smaller rents ST, retina flat and attached                      Last A1c in chart: 5.5% on 11/03/2023     Assessment and Plan:       ICD-10-CM ICD-9-CM   1. Retinal detachment of left eye with multiple breaks  H33.022 361.02   2. S/P eye surgery  Z98.890 V45.69       RRD OS  Macula involving with large ST tear    S/p 25g PPV/AFx/EL/C3F8 OS 2/7/24    - Doing well   - Med instructions: Stop Vigamox, Taper PF 3-2-1-stop, decrease Atropine to daily  - Reminded of likely continued progression of cataract  - Reminded of likely appearance of gas bubble within vision  - Positioning instructions: Right side down  - RD/Endophthalmitis/Return precautions discussed      2. NS OU  Will need CE eval OS soon after PPV      Follow up: RTC 3-4 weeks for POM#1, sooner PRN if any problems (isai pain, redness, dimming or loss of vision)

## 2024-02-24 ENCOUNTER — PATIENT MESSAGE (OUTPATIENT)
Dept: OPTOMETRY | Facility: CLINIC | Age: 69
End: 2024-02-24
Payer: MEDICARE

## 2024-02-28 ENCOUNTER — PATIENT MESSAGE (OUTPATIENT)
Dept: OPHTHALMOLOGY | Facility: CLINIC | Age: 69
End: 2024-02-28
Payer: MEDICARE

## 2024-03-12 ENCOUNTER — OFFICE VISIT (OUTPATIENT)
Dept: OPHTHALMOLOGY | Facility: CLINIC | Age: 69
End: 2024-03-12
Payer: MEDICARE

## 2024-03-12 DIAGNOSIS — H33.022 RETINAL DETACHMENT OF LEFT EYE WITH MULTIPLE BREAKS: Primary | ICD-10-CM

## 2024-03-12 PROCEDURE — 99024 POSTOP FOLLOW-UP VISIT: CPT | Mod: HCNC,S$GLB,, | Performed by: OPHTHALMOLOGY

## 2024-03-12 PROCEDURE — 1101F PT FALLS ASSESS-DOCD LE1/YR: CPT | Mod: HCNC,CPTII,S$GLB, | Performed by: OPHTHALMOLOGY

## 2024-03-12 PROCEDURE — 1159F MED LIST DOCD IN RCRD: CPT | Mod: HCNC,CPTII,S$GLB, | Performed by: OPHTHALMOLOGY

## 2024-03-12 PROCEDURE — 3288F FALL RISK ASSESSMENT DOCD: CPT | Mod: HCNC,CPTII,S$GLB, | Performed by: OPHTHALMOLOGY

## 2024-03-12 PROCEDURE — 99999 PR PBB SHADOW E&M-EST. PATIENT-LVL II: CPT | Mod: PBBFAC,HCNC,, | Performed by: OPHTHALMOLOGY

## 2024-03-12 NOTE — PROGRESS NOTES
HPI     POST OP 1 MONTH FOLLOW UP  OS      Additional comments: OS  POST OP   1 MONTH S/P PPV  25g  AFX  C3 F 8    WITH DR ARVIZU       NO GTTS            Comments    Dls: 02/08//2024    1wk post op PPV OS    Eye meds  Otc gtts ou prn             Comments    Dls: 02/06/2024    69 y/o male presents today with c/o x 1 day when playing pickle ball   shadows nasal os flashes os blurry vision nasally os no pain no ha's.     Eye meds  Otc gtts ou prn       A/P    RRD OS  Macula involving with large ST tear    S/p 25g PPV/AFx/EL/C3F8 OS 2/7/24    Doing well, good IOP    Ok off drops      Sleep on right  Ok for face forward throughout day    2. NS OU  Will need CE eval OS soon after PPV        2 months OCT/MRx

## 2024-04-14 ENCOUNTER — PATIENT MESSAGE (OUTPATIENT)
Dept: ADMINISTRATIVE | Facility: OTHER | Age: 69
End: 2024-04-14
Payer: MEDICARE

## 2024-05-14 ENCOUNTER — OFFICE VISIT (OUTPATIENT)
Dept: OPHTHALMOLOGY | Facility: CLINIC | Age: 69
End: 2024-05-14
Payer: MEDICARE

## 2024-05-14 DIAGNOSIS — H35.22 PROLIFERATIVE VITREORETINOPATHY, LEFT: ICD-10-CM

## 2024-05-14 DIAGNOSIS — H33.022 RETINAL DETACHMENT OF LEFT EYE WITH MULTIPLE BREAKS: Primary | ICD-10-CM

## 2024-05-14 PROCEDURE — 92014 COMPRE OPH EXAM EST PT 1/>: CPT | Mod: 57,HCNC,S$GLB, | Performed by: OPHTHALMOLOGY

## 2024-05-14 PROCEDURE — 99999 PR PBB SHADOW E&M-EST. PATIENT-LVL III: CPT | Mod: PBBFAC,HCNC,, | Performed by: OPHTHALMOLOGY

## 2024-05-14 PROCEDURE — 1160F RVW MEDS BY RX/DR IN RCRD: CPT | Mod: HCNC,CPTII,S$GLB, | Performed by: OPHTHALMOLOGY

## 2024-05-14 PROCEDURE — 3288F FALL RISK ASSESSMENT DOCD: CPT | Mod: HCNC,CPTII,S$GLB, | Performed by: OPHTHALMOLOGY

## 2024-05-14 PROCEDURE — 67210 TREATMENT OF RETINAL LESION: CPT | Mod: HCNC,LT,S$GLB, | Performed by: OPHTHALMOLOGY

## 2024-05-14 PROCEDURE — 1101F PT FALLS ASSESS-DOCD LE1/YR: CPT | Mod: HCNC,CPTII,S$GLB, | Performed by: OPHTHALMOLOGY

## 2024-05-14 PROCEDURE — 92134 CPTRZ OPH DX IMG PST SGM RTA: CPT | Mod: HCNC,S$GLB,, | Performed by: OPHTHALMOLOGY

## 2024-05-14 PROCEDURE — 1159F MED LIST DOCD IN RCRD: CPT | Mod: HCNC,CPTII,S$GLB, | Performed by: OPHTHALMOLOGY

## 2024-05-14 NOTE — PROGRESS NOTES
HPI    Dls: 03/12/24  GLASSES ARE FROM LAST YEAR   1wk post op PPV OS  HARDER TO FOCUS IN OS LATELY  AFTER SX   Eye meds      CATARACT GETTING   STRONGER IN OS LATELY  READY TO DISCUSS OPTIONS FOR   CATARACT SURGERY  C/O  HARDER TO FOCUS DISTANCE AND NEAR   HARDER TO  READ LATELY  GLASSES  ARE OLDER  MAY NEED  TO SEE A SPECIALIST FOR CATARACT  TESTING    Otc gtts ou prn       Last edited by Darling Cleary on 5/14/2024  8:11 AM.        OCT - NO ME OU  No ERM   VMA OD        A/P    RRD OS  Macula involving with large ST tear    S/p 25g PPV/AFx/EL/C3F8 OS 2/7/24 5/14/24    Recurrent inferior RRD with small tuft of PVR and associated break at 6:00    Laser retinopexy today.    If breaks through laser, then repeat PPV with SO        2. NS OU  Will need CE eval OS soon after PPV        1 week Dilate OS only    Risks, benefits, and alternatives to treatment discussed in detail with the patient.  The patient voiced understanding and wished to proceed with the procedure    Laser Procedure Note  Dx: Recurrent RRD OS  Laser: Retinopexy OS  Argon  Spot: MIQUEL  Power: 200-400  Dur: 0.15  #:  [id number]  Complications: None  F/U as above

## 2024-05-21 ENCOUNTER — OFFICE VISIT (OUTPATIENT)
Dept: OPHTHALMOLOGY | Facility: CLINIC | Age: 69
End: 2024-05-21
Payer: MEDICARE

## 2024-05-21 DIAGNOSIS — H33.022 RETINAL DETACHMENT OF LEFT EYE WITH MULTIPLE BREAKS: Primary | ICD-10-CM

## 2024-05-21 DIAGNOSIS — H35.22 PROLIFERATIVE VITREORETINOPATHY, LEFT: ICD-10-CM

## 2024-05-21 PROCEDURE — 1126F AMNT PAIN NOTED NONE PRSNT: CPT | Mod: HCNC,CPTII,S$GLB, | Performed by: OPHTHALMOLOGY

## 2024-05-21 PROCEDURE — 99024 POSTOP FOLLOW-UP VISIT: CPT | Mod: HCNC,S$GLB,, | Performed by: OPHTHALMOLOGY

## 2024-05-21 PROCEDURE — 1101F PT FALLS ASSESS-DOCD LE1/YR: CPT | Mod: HCNC,CPTII,S$GLB, | Performed by: OPHTHALMOLOGY

## 2024-05-21 PROCEDURE — 1160F RVW MEDS BY RX/DR IN RCRD: CPT | Mod: HCNC,CPTII,S$GLB, | Performed by: OPHTHALMOLOGY

## 2024-05-21 PROCEDURE — 3288F FALL RISK ASSESSMENT DOCD: CPT | Mod: HCNC,CPTII,S$GLB, | Performed by: OPHTHALMOLOGY

## 2024-05-21 PROCEDURE — 1159F MED LIST DOCD IN RCRD: CPT | Mod: HCNC,CPTII,S$GLB, | Performed by: OPHTHALMOLOGY

## 2024-05-21 PROCEDURE — 99999 PR PBB SHADOW E&M-EST. PATIENT-LVL III: CPT | Mod: PBBFAC,HCNC,, | Performed by: OPHTHALMOLOGY

## 2024-05-21 NOTE — PROGRESS NOTES
HPI     Follow-up     Additional comments: 1 week RD OS           Comments    Patient states VA OS still blurry, no pain and denies floaters or flashes.    AT ou prn          Last edited by Susanne Zuleta on 5/21/2024  8:33 AM.            OCT - NO ME OU  No ERM   VMA OD        A/P    RRD OS  Macula involving with large ST tear    S/p 25g PPV/AFx/EL/C3F8 OS 2/7/24 5/14/24    Recurrent inferior RRD with small tuft of PVR and associated break at 6:00    S/p Laser retinopexy  OS 5/14/24    Doing well, good retinopexy        2. NS OU   Increasing OS post PPV - setup CE Eval OS        3 months OCT

## 2024-06-04 ENCOUNTER — TELEPHONE (OUTPATIENT)
Dept: PRIMARY CARE CLINIC | Facility: CLINIC | Age: 69
End: 2024-06-04
Payer: MEDICARE

## 2024-06-04 NOTE — TELEPHONE ENCOUNTER
----- Message from Genaro Ortiz sent at 6/4/2024  2:29 PM CDT -----  Contact: Pt  [phone]  Patient is returning a phone call.  Who left a message for the patient: Josiane Luna  Does patient know what this is regarding: Yes   Would you like a call back, or a response through your [facility] portal?:   Callback  Comments:

## 2024-06-04 NOTE — TELEPHONE ENCOUNTER
----- Message from Josseline Morris sent at 6/4/2024  9:40 AM CDT -----  Contact: Self [phone]  Would like to receive medical advice.     Would they like a call back or a response via [facility]:  call back    Additional information:  Calling to speak with the office to get a medicare wellness scheduled a this location.

## 2024-06-05 NOTE — PROGRESS NOTES
"  Hugh Bates presented for a  Medicare AWV and comprehensive Health Risk Assessment today. He is a patient of Dr. Childers and is new to me.  The following components were reviewed and updated:    Medical history  Family History  Social history  Allergies and Current Medications  Health Risk Assessment  Health Maintenance  Care Team     ** See Completed Assessments for Annual Wellness Visit within the encounter summary.**    The following assessments were completed:  Living Situation  CAGE  Depression Screening  Timed Get Up and Go  Whisper Test  Cognitive Function Screening  Nutrition Screening  ADL Screening  PAQ Screening  Review for opioid screen: pt does not have rx for opioid  Review for substance use disorder:  pt does not use substance        Vitals:    06/06/24 0859   BP: 120/68   BP Location: Right arm   Patient Position: Sitting   Pulse: (!) 58   SpO2: 97%   Weight: 71 kg (156 lb 8.4 oz)   Height: 5' 9" (1.753 m)     Body mass index is 23.11 kg/m².  Physical Exam  Vitals reviewed.   Constitutional:       Appearance: Normal appearance.   HENT:      Head: Normocephalic and atraumatic.   Cardiovascular:      Rate and Rhythm: Normal rate and regular rhythm.      Pulses: Normal pulses.           Radial pulses are 2+ on the right side and 2+ on the left side.        Dorsalis pedis pulses are 2+ on the right side and 2+ on the left side.        Posterior tibial pulses are 2+ on the right side and 2+ on the left side.      Heart sounds: Normal heart sounds.   Pulmonary:      Effort: Pulmonary effort is normal.      Breath sounds: Normal breath sounds.   Musculoskeletal:      Right lower leg: No edema.      Left lower leg: No edema.   Skin:     General: Skin is warm and dry.      Capillary Refill: Capillary refill takes less than 2 seconds.   Neurological:      General: No focal deficit present.      Mental Status: He is alert and oriented to person, place, and time.   Psychiatric:         Mood and Affect: " Mood normal.         Behavior: Behavior normal.          Diagnoses and health risks identified today and associated recommendations/orders:    1. Encounter for preventive health examination  Comments:  Assessment and evaluation performed as stated above    2. Hypercholesteremia  Comments:  stable on diet and exercise.  Followed by pcp    3. Family history of Alzheimer's disease  Comments:  Pt's younger sister has been dx with alzheimers.  Pt's mother has been dx with dementia.  Pt and his wife are concerned about memory loss.  Referral initiated  Orders:  -     Ambulatory referral/consult to Neurology; Future; Expected date: 06/13/2024    4. Family history of Parkinson's disease  Comments:  Pt's maternal uncle dx with parkinsons.  Pt's wife said that pt has an occasional twitch that they would like further evaluation on.  Referral initiated.  Orders:  -     Ambulatory referral/consult to Neurology; Future; Expected date: 06/13/2024    5. Failed hearing screening  -     Ambulatory referral/consult to ENT; Future; Expected date: 06/13/2024  -     Ambulatory referral/consult to Audiology; Future; Expected date: 06/13/2024    6. Bilateral hearing loss, unspecified hearing loss type  Comments:  Pt did not pass whisper test.  Referral initiated.  Orders:  -     Ambulatory referral/consult to Audiology; Future; Expected date: 06/13/2024        Provided Peter with a 5-10 year written screening schedule and personal prevention plan. Recommendations were developed using the USPSTF age appropriate recommendations. Education, counseling, and referrals were provided as needed. After Visit Summary printed and given to patient which includes a list of additional screenings\tests needed.    Follow up in about 1 year (around 6/6/2025), or if symptoms worsen or fail to improve.        Delma Sullivan NP      Portions of this note may have been generated using voice recognition software.  Please excuse any spelling/grammatical errors.  Occasional wrong-word or sound-a-like substitutions may have also occurred due to the inherent limitations of voice recognition software. Please read the chart carefully and recognize, using context, where substitutions have occurred.    I offered to discuss advanced care planning, including how to pick a person who would make decisions for you if you were unable to make them for yourself, called a health care power of , and what kind of decisions you might make such as use of life sustaining treatments such as ventilators and tube feeding when faced with a life limiting illness recorded on a living will that they will need to know. (How you want to be cared for as you near the end of your natural life)     X Patient is interested in learning more about how to make advanced directives.  I provided them paperwork and offered to discuss this with them.

## 2024-06-05 NOTE — PATIENT INSTRUCTIONS
Counseling and Referral of Other Preventative  (Italic type indicates deductible and co-insurance are waived)    Patient Name: Hugh Bates  Today's Date: 6/5/2024    Health Maintenance       Date Due Completion Date    High Dose Statin Never done ---    COVID-19 Vaccine (8 - 2023-24 season) 03/10/2024 11/10/2023    TETANUS VACCINE 12/14/2025 12/14/2015 (Done)    Override on 12/14/2015: Done    Colorectal Cancer Screening 09/30/2026 9/30/2016    Override on 5/16/2007: Done    Lipid Panel 11/03/2028 11/3/2023        No orders of the defined types were placed in this encounter.      The following information is provided to all patients.  This information is to help you find resources for any of the problems found today that may be affecting your health:                  Living healthy guide: www.[facility].[place].gov      Understanding Diabetes: www.diabetes.org      Eating healthy: www.cdc.gov/healthyweight      CDC home safety checklist: www.cdc.gov/steadi/patient.html      Agency on Aging: www.goea.[place].[facility]      Alcoholics anonymous (AA): www.aa.org      Physical Activity: www.vibha.nih.gov/xc6udpv      Tobacco use: www.quitwithusla.org

## 2024-06-06 ENCOUNTER — OFFICE VISIT (OUTPATIENT)
Dept: PRIMARY CARE CLINIC | Facility: CLINIC | Age: 69
End: 2024-06-06
Payer: MEDICARE

## 2024-06-06 VITALS
SYSTOLIC BLOOD PRESSURE: 120 MMHG | OXYGEN SATURATION: 97 % | BODY MASS INDEX: 23.18 KG/M2 | DIASTOLIC BLOOD PRESSURE: 68 MMHG | HEART RATE: 58 BPM | WEIGHT: 156.5 LBS | HEIGHT: 69 IN

## 2024-06-06 DIAGNOSIS — Z00.00 ENCOUNTER FOR PREVENTIVE HEALTH EXAMINATION: Primary | ICD-10-CM

## 2024-06-06 DIAGNOSIS — Z82.0 FAMILY HISTORY OF PARKINSON'S DISEASE: ICD-10-CM

## 2024-06-06 DIAGNOSIS — E78.00 HYPERCHOLESTEREMIA: ICD-10-CM

## 2024-06-06 DIAGNOSIS — H91.93 BILATERAL HEARING LOSS, UNSPECIFIED HEARING LOSS TYPE: ICD-10-CM

## 2024-06-06 DIAGNOSIS — Z82.0 FAMILY HISTORY OF ALZHEIMER'S DISEASE: ICD-10-CM

## 2024-06-06 DIAGNOSIS — R94.120 FAILED HEARING SCREENING: ICD-10-CM

## 2024-06-06 PROCEDURE — 1170F FXNL STATUS ASSESSED: CPT | Mod: CPTII,S$GLB,,

## 2024-06-06 PROCEDURE — 3288F FALL RISK ASSESSMENT DOCD: CPT | Mod: CPTII,S$GLB,,

## 2024-06-06 PROCEDURE — 3078F DIAST BP <80 MM HG: CPT | Mod: CPTII,S$GLB,,

## 2024-06-06 PROCEDURE — 1158F ADVNC CARE PLAN TLK DOCD: CPT | Mod: CPTII,S$GLB,,

## 2024-06-06 PROCEDURE — 1101F PT FALLS ASSESS-DOCD LE1/YR: CPT | Mod: CPTII,S$GLB,,

## 2024-06-06 PROCEDURE — 1159F MED LIST DOCD IN RCRD: CPT | Mod: CPTII,S$GLB,,

## 2024-06-06 PROCEDURE — 3074F SYST BP LT 130 MM HG: CPT | Mod: CPTII,S$GLB,,

## 2024-06-06 PROCEDURE — 99999 PR PBB SHADOW E&M-EST. PATIENT-LVL V: CPT | Mod: PBBFAC,HCNC,,

## 2024-06-06 PROCEDURE — 1160F RVW MEDS BY RX/DR IN RCRD: CPT | Mod: CPTII,S$GLB,,

## 2024-06-06 PROCEDURE — G0439 PPPS, SUBSEQ VISIT: HCPCS | Mod: HCNC,S$GLB,,

## 2024-06-06 NOTE — PROGRESS NOTES
[facility] - NEUROLOGY [address]TH [address] [facility], [place]    Date: 6/7/24  Patient Name: Hugh Bates   MRN: [id number]   PCP: Joseph Santana  Referring Provider: Delma Sullivan NP    Chief Complaint: Memory Concerns  Subjective:          HPI:   Mr. Hugh Bates is a 69 y.o. male presenting for evaluation of memory concerns. Per the patient he had his PCP evaluate him and was concerned for some memory impairment. He also has a family history of a younger sister with Alzheimer's and a mother with vascular dementia. He also had some concerns because per his wife he has full body twitches while he is sleeping but does not get out of bed or sleep walk. He is very active he plays [employer] ball regularly and does have a health social. He denies any tremor or gait changes. He denies any falls, he does feel slightly off balance when waking in the middle of the night to go to the bathroom however this does not routinely happen. He does have some night time quick jerking movements but not necessarily that he gets up and walks or acts out dreams. He notes that he has been walking well and denies any changes in his gait. He finds that he did not sign his name as much as previously and felt as if it was off with his handwriting at first but then he found he was just out of practice and it returned to normal. He denies any unintentional weight loss, bladder, or bowel changes. He vincent any noted changes in his vision but does find that his hearing seems to be somewhat decreasing.     PAST MEDICAL HISTORY:  Past Medical History:   Diagnosis Date    Basal cell carcinoma     Urinary tract infection        PAST SURGICAL HISTORY:  Past Surgical History:   Procedure Laterality Date    COLONOSCOPY N/A 9/30/2016    Procedure: COLONOSCOPY;  Surgeon: George Virgen MD;  Location: [facility] (4TH [facility]);  Service: Colon and Rectal;  Laterality: N/A;    COLONOSCOPY      FRACTURE SURGERY  1974     broken thumb    REPAIR OF RETINAL DETACHMENT WITH VITRECTOMY Left 2024    Procedure: REPAIR, RETINAL DETACHMENT, WITH VITRECTOMY;  Surgeon: JOHN Ortiz MD;  Location: [facility] OR [address] [place] [address];  Service: Ophthalmology;  Laterality: Left;       CURRENT MEDS:  Current Outpatient Medications   Medication Sig Dispense Refill    collagen, hydrolysate, bovine, (COLLAGEN, HYDR, BOVINE,, BULK,) 100 % Powd       fish,bora,flax oils-om3,6,9no1 400-400-400 mg Cap Take by mouth.      glucosamine-chondroitin 500-400 mg tablet Take 2 tablets by mouth once daily.      loratadine (CLARITIN) 10 mg tablet       multivitamin (THERAGRAN) per tablet Take 1 tablet by mouth once daily.      PNEUMOVAX-23 25 mcg/0.5 mL vaccine       RSVPreF3 antigen-AS01E, PF, (AREXVY) 120 mcg/0.5 mL SusR vaccine Inject 0.5mL into the muscle once 0.5 mL 0     No current facility-administered medications for this visit.       ALLERGIES:  Review of patient's allergies indicates:  No Known Allergies    FAMILY HISTORY:  Family History   Problem Relation Name Age of Onset    Colon polyps Mother Jimmy Bates     Squamous cell carcinoma Mother Jimmy Bates     Hearing loss Mother Jimmy Bates         Bad hearing, but will not get hearing aid    Cataracts Mother Jimmy Bates     Cancer Father Cristian Bates Jr.          in     Early death Father Cristian Bates Jr.         was 44 in , [employer]    No Known Problems Sister      No Known Problems Sister      No Known Problems Sister      No Known Problems Brother      No Known Problems Maternal Aunt      No Known Problems Maternal Uncle      No Known Problems Paternal Aunt      No Known Problems Paternal Uncle      Arthritis Maternal Grandmother Michaela Wiseman     No Known Problems Maternal Grandfather      No Known Problems Paternal Grandmother      No Known Problems Paternal Grandfather      No Known Problems Other      Diabetes Neg Hx      Melanoma Neg Hx         SOCIAL  "HISTORY:  Social History     Tobacco Use    Smoking status: Never    Smokeless tobacco: Never   Substance Use Topics    Alcohol use: Yes     Alcohol/week: 7.0 standard drinks of alcohol     Types: 1 Glasses of wine, 6 Cans of beer per week    Drug use: Never       Review of Systems:  Review of Systems   Constitutional:  Negative for fever, malaise/fatigue and weight loss.   HENT:  Negative for congestion, ear discharge, ear pain, hearing loss, nosebleeds, sinus pain and sore throat.    Eyes:  Negative for blurred vision, double vision, photophobia, pain and discharge.   Respiratory:  Negative for cough, hemoptysis and shortness of breath.    Cardiovascular:  Negative for chest pain, palpitations, orthopnea and leg swelling.   Gastrointestinal:  Negative for abdominal pain, blood in stool, constipation, diarrhea, nausea and vomiting.   Genitourinary:  Negative for dysuria, frequency, hematuria and urgency.   Musculoskeletal:  Negative for back pain, falls, myalgias and neck pain.   Skin:  Negative for itching and rash.   Neurological:  Negative for dizziness, tingling, focal weakness, seizures, loss of consciousness, weakness and headaches.            Objective:     Vitals:    06/07/24 1026   BP: 118/73   Pulse: (!) 52   Weight: 69.7 kg (153 lb 12.3 oz)   Height: 5' 9" (1.753 m)       Lab Results   Component Value Date    WBC 2.86 (L) 11/03/2023    HGB 14.2 11/03/2023    HCT 42.6 11/03/2023     11/03/2023    CHOL 282 (H) 11/03/2023    TRIG 40 11/03/2023     (H) 11/03/2023    ALT 34 11/03/2023    AST 39 11/03/2023     (L) 11/03/2023    K 5.4 (H) 11/03/2023     11/03/2023    CREATININE 1.0 11/03/2023    BUN 18 11/03/2023    CO2 24 11/03/2023    TSH 1.669 11/03/2023    HGBA1C 5.5 11/03/2023    [id number] 669 12/10/2020       NEUROLOGICAL EXAMINATION:     MENTAL STATUS   Oriented to person, place, and time.   Level of consciousness: alert    CRANIAL NERVES     CN III, IV, VI   Pupils are equal, " round, and reactive to light.  Extraocular motions are normal.     CN V   Facial sensation intact.     CN VII   Facial expression full, symmetric.     CN VIII   CN VIII normal.     CN IX, X   CN IX normal.   CN X normal.     CN XI   CN XI normal.     CN XII   CN XII normal.     MOTOR EXAM   Right arm tone: normal  Left arm tone: normal    REFLEXES     Reflexes   Right brachioradialis: 3+  Left brachioradialis: 3+  Right biceps: 3+  Left biceps: 3+  Right triceps: 3+  Left triceps: 3+  Right patellar: 2+  Left patellar: 2+  Right achilles: 2+  Left achilles: 2+  Right Haskins: present  Left Haskins: present       Suprapatellar with cross present bilaterally      SENSORY EXAM   Light touch normal.   Vibration normal.     GAIT AND COORDINATION     Gait  Gait: normal     Coordination   Finger to nose coordination: normal    Tremor   Resting tremor: absent  Intention tremor: absent  Action tremor: absent    ? ?        Assessment:   Hugh Bates is a 69 y.o. male presenting for evlauation of memory and jerking movements concern.     Plan:   I discussed with the patient in depth the risk/benefits of the following plan and the patient was amenable. We discussed the following:     Referral to neuropsych for full cognitive and memory work up     Do not have high level of suspcion for the patient to have parkinsons at this time does not have symptoms consistent and physical exam is not consistent with parkinsons diagnosis can continue to monitor     Patient does have grossly abnormal reflexes given age and would like to pursue further imaging work up and patient reports chiropractic visits     Problem List Items Addressed This Visit          Neuro    Family history of Alzheimer's disease    Relevant Orders    Ambulatory referral/consult to Neuropsychology     Other Visit Diagnoses       Abnormal neurological exam    -  Primary    Relevant Orders    MRI Cervical Spine Without Contrast    Family history of Parkinson's  disease        Pt's maternal uncle dx with parkinsons.  Pt's wife said that pt has an occasional twitch that they would like further evaluation on.  Referral initiated.    Cervical myelopathy        Relevant Orders    MRI Cervical Spine Without Contrast              I spent a total of 40 minutes on the day of the visit. This includes face to face time and non-face to face time preparing to see the patient (eg, review of tests), obtaining and/or reviewing separately obtained history, documenting clinical information in the electronic or other health record, independently interpreting results and communicating results to the patient/family/caregiver, or care coordinator.        Mallory Bland PA-C  Supervising physician Shmuel Painting MD   [facility]

## 2024-06-07 ENCOUNTER — OFFICE VISIT (OUTPATIENT)
Dept: NEUROLOGY | Facility: CLINIC | Age: 69
End: 2024-06-07
Payer: MEDICARE

## 2024-06-07 VITALS
HEIGHT: 69 IN | BODY MASS INDEX: 22.77 KG/M2 | SYSTOLIC BLOOD PRESSURE: 118 MMHG | WEIGHT: 153.75 LBS | DIASTOLIC BLOOD PRESSURE: 73 MMHG | HEART RATE: 52 BPM

## 2024-06-07 DIAGNOSIS — Z82.0 FAMILY HISTORY OF ALZHEIMER'S DISEASE: ICD-10-CM

## 2024-06-07 DIAGNOSIS — R29.90 ABNORMAL NEUROLOGICAL EXAM: Primary | ICD-10-CM

## 2024-06-07 DIAGNOSIS — Z82.0 FAMILY HISTORY OF PARKINSON'S DISEASE: ICD-10-CM

## 2024-06-07 DIAGNOSIS — G95.9 CERVICAL MYELOPATHY: ICD-10-CM

## 2024-06-07 PROCEDURE — 3078F DIAST BP <80 MM HG: CPT | Mod: CPTII,S$GLB,, | Performed by: PHYSICIAN ASSISTANT

## 2024-06-07 PROCEDURE — 3074F SYST BP LT 130 MM HG: CPT | Mod: CPTII,S$GLB,, | Performed by: PHYSICIAN ASSISTANT

## 2024-06-07 PROCEDURE — 3288F FALL RISK ASSESSMENT DOCD: CPT | Mod: CPTII,S$GLB,, | Performed by: PHYSICIAN ASSISTANT

## 2024-06-07 PROCEDURE — 1126F AMNT PAIN NOTED NONE PRSNT: CPT | Mod: CPTII,S$GLB,, | Performed by: PHYSICIAN ASSISTANT

## 2024-06-07 PROCEDURE — 99215 OFFICE O/P EST HI 40 MIN: CPT | Mod: S$GLB,,, | Performed by: PHYSICIAN ASSISTANT

## 2024-06-07 PROCEDURE — 1101F PT FALLS ASSESS-DOCD LE1/YR: CPT | Mod: CPTII,S$GLB,, | Performed by: PHYSICIAN ASSISTANT

## 2024-06-07 PROCEDURE — 3008F BODY MASS INDEX DOCD: CPT | Mod: CPTII,S$GLB,, | Performed by: PHYSICIAN ASSISTANT

## 2024-06-07 PROCEDURE — 99999 PR PBB SHADOW E&M-EST. PATIENT-LVL IV: CPT | Mod: PBBFAC,,, | Performed by: PHYSICIAN ASSISTANT

## 2024-06-07 PROCEDURE — 1159F MED LIST DOCD IN RCRD: CPT | Mod: CPTII,S$GLB,, | Performed by: PHYSICIAN ASSISTANT

## 2024-06-07 PROCEDURE — 1160F RVW MEDS BY RX/DR IN RCRD: CPT | Mod: CPTII,S$GLB,, | Performed by: PHYSICIAN ASSISTANT

## 2024-07-01 ENCOUNTER — HOSPITAL ENCOUNTER (OUTPATIENT)
Dept: RADIOLOGY | Facility: HOSPITAL | Age: 69
Discharge: HOME | End: 2024-07-01
Attending: PHYSICIAN ASSISTANT
Payer: MEDICARE

## 2024-07-01 DIAGNOSIS — R29.90 ABNORMAL NEUROLOGICAL EXAM: ICD-10-CM

## 2024-07-01 DIAGNOSIS — G95.9 CERVICAL MYELOPATHY: ICD-10-CM

## 2024-07-01 DIAGNOSIS — G95.9 CERVICAL MYELOPATHY: Primary | ICD-10-CM

## 2024-07-01 PROCEDURE — 72141 MRI NECK SPINE W/O DYE: CPT | Mod: TC

## 2024-07-01 PROCEDURE — 72141 MRI NECK SPINE W/O DYE: CPT | Mod: 26,,, | Performed by: RADIOLOGY

## 2024-07-22 ENCOUNTER — OFFICE VISIT (OUTPATIENT)
Dept: NEUROSURGERY | Facility: CLINIC | Age: 69
End: 2024-07-22
Payer: MEDICARE

## 2024-07-22 DIAGNOSIS — M48.02 CERVICAL STENOSIS OF SPINAL CANAL: Primary | ICD-10-CM

## 2024-07-22 DIAGNOSIS — G95.9 CERVICAL MYELOPATHY: ICD-10-CM

## 2024-07-22 PROCEDURE — 99442 PR PHYSICIAN TELEPHONE EVALUATION 11-20 MIN: CPT | Mod: HCNC,95,, | Performed by: NURSE PRACTITIONER

## 2024-07-22 PROCEDURE — 1159F MED LIST DOCD IN RCRD: CPT | Mod: HCNC,CPTII,95, | Performed by: NURSE PRACTITIONER

## 2024-07-22 PROCEDURE — 1160F RVW MEDS BY RX/DR IN RCRD: CPT | Mod: HCNC,CPTII,95, | Performed by: NURSE PRACTITIONER

## 2024-07-22 NOTE — PROGRESS NOTES
Established Patient - Audio Only Telehealth Visit     The patient location is: Home  The chief complaint leading to consultation is: Cervical Myelopathy  Visit type: Virtual visit with audio only (telephone)  Total time spent with patient: 12 minutes       The reason for the audio only service rather than synchronous audio and video virtual visit was related to technical difficulties or patient preference/necessity.     Each patient to whom I provide medical services by telemedicine is:  (1) informed of the relationship between the physician and patient and the respective role of any other health care provider with respect to management of the patient; and (2) notified that they may decline to receive medical services by telemedicine and may withdraw from such care at any time. Patient verbally consented to receive this service via voice-only telephone call.       HPI:  Hugh Bates is a 69 y.o. male seen today as a referral from neurology to discuss his cervical MRI findings and concerns with cervical myelopathy. States that he was seeing neurology due to his family hx of dementia and wanted to rule out any memory concerns. Denies any neck or arm pain, weakness, dropping of objects, balance difficulties, b/b dysfunction, gait instability, or saddle anesthesia. States that he is very active with home exercises and pickle ball. We discussed that the MRI dated 7/1/24 shows multilevel degenerative changes most pronounced C3-4 and C4-5 with questionable T2 signal hyperintensity. Given his lack of progressive myelopathy, we discussed that we will follow him closely and to notify the clinic of any changes. Red flag symptoms were reviewed and he verbalized understanding. I would like the patient to follow-up in clinic in 2-3 months to re-evaluate for progression of myelopathy. I have encouraged him to contact the clinic with any questions, concerns, or adverse clinical changes. He verbalized understanding.         MICHAEL Salcedo-ART  Neurosurgery  [facility] [place].           This service was not originating from a related E/M service provided within the previous 7 days nor will  to an E/M service or procedure within the next 24 hours or my soonest available appointment.  Prevailing standard of care was able to be met in this audio-only visit.

## 2024-08-07 ENCOUNTER — HOSPITAL ENCOUNTER (OUTPATIENT)
Dept: RADIOLOGY | Facility: HOSPITAL | Age: 69
Discharge: HOME | End: 2024-08-07
Attending: NURSE PRACTITIONER
Payer: MEDICARE

## 2024-08-07 DIAGNOSIS — G95.9 CERVICAL MYELOPATHY: ICD-10-CM

## 2024-08-07 DIAGNOSIS — M48.02 CERVICAL STENOSIS OF SPINAL CANAL: ICD-10-CM

## 2024-08-08 ENCOUNTER — OFFICE VISIT (OUTPATIENT)
Dept: DERMATOLOGY | Facility: CLINIC | Age: 69
End: 2024-08-08
Payer: MEDICARE

## 2024-08-08 DIAGNOSIS — Z12.83 SCREENING EXAM FOR SKIN CANCER: ICD-10-CM

## 2024-08-08 DIAGNOSIS — L82.1 SEBORRHEIC KERATOSIS: ICD-10-CM

## 2024-08-08 DIAGNOSIS — L81.4 LENTIGINES: ICD-10-CM

## 2024-08-08 DIAGNOSIS — Z85.828 HISTORY OF NONMELANOMA SKIN CANCER: ICD-10-CM

## 2024-08-08 DIAGNOSIS — L57.0 ACTINIC KERATOSIS: Primary | ICD-10-CM

## 2024-08-08 DIAGNOSIS — D22.9 MULTIPLE BENIGN NEVI: ICD-10-CM

## 2024-08-08 PROCEDURE — 1160F RVW MEDS BY RX/DR IN RCRD: CPT | Mod: CPTII,,, | Performed by: DERMATOLOGY

## 2024-08-08 PROCEDURE — 17000 DESTRUCT PREMALG LESION: CPT | Mod: ,,, | Performed by: DERMATOLOGY

## 2024-08-08 PROCEDURE — 1159F MED LIST DOCD IN RCRD: CPT | Mod: CPTII,,, | Performed by: DERMATOLOGY

## 2024-08-08 PROCEDURE — 1126F AMNT PAIN NOTED NONE PRSNT: CPT | Mod: CPTII,,, | Performed by: DERMATOLOGY

## 2024-08-08 PROCEDURE — 1101F PT FALLS ASSESS-DOCD LE1/YR: CPT | Mod: CPTII,,, | Performed by: DERMATOLOGY

## 2024-08-08 PROCEDURE — 3288F FALL RISK ASSESSMENT DOCD: CPT | Mod: CPTII,,, | Performed by: DERMATOLOGY

## 2024-08-08 PROCEDURE — 17003 DESTRUCT PREMALG LES 2-14: CPT | Mod: ,,, | Performed by: DERMATOLOGY

## 2024-08-08 PROCEDURE — 99213 OFFICE O/P EST LOW 20 MIN: CPT | Mod: 25,S$GLB,, | Performed by: DERMATOLOGY

## 2024-08-20 ENCOUNTER — OFFICE VISIT (OUTPATIENT)
Dept: OPHTHALMOLOGY | Facility: CLINIC | Age: 69
End: 2024-08-20
Payer: MEDICARE

## 2024-08-20 ENCOUNTER — CLINICAL SUPPORT (OUTPATIENT)
Dept: OPHTHALMOLOGY | Facility: CLINIC | Age: 69
End: 2024-08-20
Payer: MEDICARE

## 2024-08-20 DIAGNOSIS — H43.821 VITREOMACULAR ADHESION, RIGHT: Primary | ICD-10-CM

## 2024-08-20 DIAGNOSIS — H33.022 RETINAL DETACHMENT OF LEFT EYE WITH MULTIPLE BREAKS: ICD-10-CM

## 2024-08-20 PROCEDURE — 1159F MED LIST DOCD IN RCRD: CPT | Mod: HCNC,CPTII,S$GLB, | Performed by: OPHTHALMOLOGY

## 2024-08-20 PROCEDURE — 92201 OPSCPY EXTND RTA DRAW UNI/BI: CPT | Mod: HCNC,S$GLB,, | Performed by: OPHTHALMOLOGY

## 2024-08-20 PROCEDURE — 92014 COMPRE OPH EXAM EST PT 1/>: CPT | Mod: HCNC,S$GLB,, | Performed by: OPHTHALMOLOGY

## 2024-08-20 PROCEDURE — 3288F FALL RISK ASSESSMENT DOCD: CPT | Mod: HCNC,CPTII,S$GLB, | Performed by: OPHTHALMOLOGY

## 2024-08-20 PROCEDURE — 92134 CPTRZ OPH DX IMG PST SGM RTA: CPT | Mod: HCNC,S$GLB,, | Performed by: OPHTHALMOLOGY

## 2024-08-20 PROCEDURE — 99999 PR PBB SHADOW E&M-EST. PATIENT-LVL III: CPT | Mod: PBBFAC,HCNC,, | Performed by: OPHTHALMOLOGY

## 2024-08-20 PROCEDURE — 1101F PT FALLS ASSESS-DOCD LE1/YR: CPT | Mod: HCNC,CPTII,S$GLB, | Performed by: OPHTHALMOLOGY

## 2024-08-20 PROCEDURE — 1160F RVW MEDS BY RX/DR IN RCRD: CPT | Mod: HCNC,CPTII,S$GLB, | Performed by: OPHTHALMOLOGY

## 2024-08-20 NOTE — PROGRESS NOTES
HPI     DFE     and OCT         RETINAL DETACHMENT    OS  FOLLOW UP     Additional comments: OCT   DFE    S/P  PPV  /AFX /EL C3F8   OS  2/7/24    LARGE TEAR IN OS  FOLLOW UP     RETINOPEXY   05/14/24           Comments    Patient states VA OS still blurry, no pain and denies floaters or flashes.  DLS      05/21/24  AT ou prn          Last edited by Darling Cleary on 8/20/2024  8:36 AM.              OCT - NO ME OU  OS with small ERM - stable  VMA OD        A/P    RRD OS  Macula involving with large ST tear    S/p 25g PPV/AFx/EL/C3F8 OS 2/7/24 5/14/24    Recurrent inferior RRD with small tuft of PVR and associated break at 6:00    S/p Laser retinopexy  OS 5/14/24    Doing well, good retinopexy        2. NS OU   Increasing OS post PPV - has CE eval        6 months OCT

## 2024-08-21 ENCOUNTER — OFFICE VISIT (OUTPATIENT)
Dept: OPHTHALMOLOGY | Facility: CLINIC | Age: 69
End: 2024-08-21
Payer: MEDICARE

## 2024-08-21 DIAGNOSIS — H25.13 NUCLEAR SCLEROSIS, BILATERAL: Primary | ICD-10-CM

## 2024-08-21 PROCEDURE — 1160F RVW MEDS BY RX/DR IN RCRD: CPT | Mod: HCNC,CPTII,S$GLB, | Performed by: OPHTHALMOLOGY

## 2024-08-21 PROCEDURE — 99999 PR PBB SHADOW E&M-EST. PATIENT-LVL III: CPT | Mod: PBBFAC,HCNC,, | Performed by: OPHTHALMOLOGY

## 2024-08-21 PROCEDURE — 92136 OPHTHALMIC BIOMETRY: CPT | Mod: HCNC,LT,S$GLB, | Performed by: OPHTHALMOLOGY

## 2024-08-21 PROCEDURE — 1159F MED LIST DOCD IN RCRD: CPT | Mod: HCNC,CPTII,S$GLB, | Performed by: OPHTHALMOLOGY

## 2024-08-21 PROCEDURE — 3288F FALL RISK ASSESSMENT DOCD: CPT | Mod: HCNC,CPTII,S$GLB, | Performed by: OPHTHALMOLOGY

## 2024-08-21 PROCEDURE — 99214 OFFICE O/P EST MOD 30 MIN: CPT | Mod: HCNC,S$GLB,, | Performed by: OPHTHALMOLOGY

## 2024-08-21 PROCEDURE — 1126F AMNT PAIN NOTED NONE PRSNT: CPT | Mod: HCNC,CPTII,S$GLB, | Performed by: OPHTHALMOLOGY

## 2024-08-21 PROCEDURE — 1101F PT FALLS ASSESS-DOCD LE1/YR: CPT | Mod: HCNC,CPTII,S$GLB, | Performed by: OPHTHALMOLOGY

## 2024-08-21 RX ORDER — PHENYLEPHRINE HYDROCHLORIDE 25 MG/ML
1 SOLUTION/ DROPS OPHTHALMIC
OUTPATIENT
Start: 2024-08-21

## 2024-08-21 RX ORDER — PHENYLEPHRINE HYDROCHLORIDE 100 MG/ML
1 SOLUTION/ DROPS OPHTHALMIC
OUTPATIENT
Start: 2024-08-21

## 2024-08-21 RX ORDER — PREDNISOLONE ACETATE-GATIFLOXACIN-BROMFENAC .75; 5; 1 MG/ML; MG/ML; MG/ML
1 SUSPENSION/ DROPS OPHTHALMIC 3 TIMES DAILY
Qty: 5 ML | Refills: 3 | Status: SHIPPED | OUTPATIENT
Start: 2024-08-21

## 2024-08-21 RX ORDER — SODIUM CHLORIDE 0.9 % (FLUSH) 0.9 %
10 SYRINGE (ML) INJECTION
OUTPATIENT
Start: 2024-08-21

## 2024-08-21 RX ORDER — TROPICAMIDE 10 MG/ML
1 SOLUTION/ DROPS OPHTHALMIC
OUTPATIENT
Start: 2024-08-21

## 2024-08-21 RX ORDER — TETRACAINE HYDROCHLORIDE 5 MG/ML
1 SOLUTION OPHTHALMIC
OUTPATIENT
Start: 2024-08-21

## 2024-08-21 RX ORDER — MOXIFLOXACIN 5 MG/ML
1 SOLUTION/ DROPS OPHTHALMIC
OUTPATIENT
Start: 2024-08-21

## 2024-08-21 NOTE — PROGRESS NOTES
HPI    Patient present today for Cataract Evaluation   Pt state blurry VA OU. Glare OU.   Floaters OU. Dry eyes     Eye med: refresh drops   Last edited by Kallie Sandoval on 8/21/2024 10:50 AM.            Assessment /Plan     For exam results, see Encounter Report.    Nuclear sclerosis, bilateral      Visually Significant Cataract: Patient reports decreased vision consistent with the clinical amount of lenticular opacity, which reaches the level of visual significance and affects activities of daily living.     Specifically, this patient describes difficulty with:  - driving safely at night  - reading road signs  - reading small print  - deciphering medicine bottles  - reading the newspaper  - using the phone  - reading texts     Risks, benefits, and alternatives to cataract surgery were discussed and the consent reviewed. IOL options were discussed, including ATIOLs and the associated side effects and additional patient cost associated with them.   IOL Selections:   Right eye  IOL: [id number] 15.0      Left eye  IOL: [id number]  15.0     Pt wishes to have LEFT eye done first.  HX RD OS, MILD NSC OD  MYOPE, WEARS CTLS    The patient expresses a desire to reduce spectacle dependence. I reviewed various IOL and LASER refractive surgical options and we will attempt to minimize spectacle dependence by managing astigmatism and optimizing IOL selection. Customized Cataract Surgery with Vision Correction (CCVC) was explained to the patient with educational videos and discussion.  The patient voices understanding and wishes to implement this technology during the cataract procedure.  I explained the increased precision of the LASER versus manual techniques, especially as it relates to astigmatism reduction with arcuate incisions.  I emphasized that although our goal is to reduce the need for refractive correction (glasses or contacts) after surgery, there may still be a need for spectacle correction to achieve optimal  visual acuity, and that a reasonable range of functional vision should be the expectation.  No guarantees are made about post operative refraction or visual acuity, as the eye may heal in unpredictable ways, and the standard risks, benefits, and alternatives to cataract surgery were explained.  The patient understands that the refractive portions of this cataract procedure are not covered by insurance, and that there is an out of pocket expense of $2000 per eye. I also explained that even though our pre-operative plan is to utilize advanced refractive technologies during surgery, that I may decide to eliminate part or all of this plan if surgical challenges or complications arise, or I feel that it is not in the patient's best interest. Consent forms and an ABN form were given to the patient to review.    ANAIS ROA

## 2024-08-22 ENCOUNTER — TELEPHONE (OUTPATIENT)
Dept: OPHTHALMOLOGY | Facility: CLINIC | Age: 69
End: 2024-08-22
Payer: MEDICARE

## 2024-08-22 DIAGNOSIS — H25.12 NUCLEAR SCLEROTIC CATARACT OF LEFT EYE: Primary | ICD-10-CM

## 2024-08-26 ENCOUNTER — TELEPHONE (OUTPATIENT)
Dept: OPHTHALMOLOGY | Facility: CLINIC | Age: 69
End: 2024-08-26
Payer: MEDICARE

## 2024-08-26 ENCOUNTER — PATIENT MESSAGE (OUTPATIENT)
Dept: OPHTHALMOLOGY | Facility: CLINIC | Age: 69
End: 2024-08-26
Payer: MEDICARE

## 2024-08-26 DIAGNOSIS — H25.11 NUCLEAR SCLEROTIC CATARACT OF RIGHT EYE: Primary | ICD-10-CM

## 2024-09-04 ENCOUNTER — LAB VISIT (OUTPATIENT)
Dept: LAB | Facility: HOSPITAL | Age: 69
End: 2024-09-04
Attending: INTERNAL MEDICINE
Payer: MEDICARE

## 2024-09-04 DIAGNOSIS — E78.00 HYPERCHOLESTEREMIA: ICD-10-CM

## 2024-09-04 LAB
CHOLEST SERPL-MCNC: 262 MG/DL (ref 120–199)
CHOLEST/HDLC SERPL: 2.8 {RATIO} (ref 2–5)
HDLC SERPL-MCNC: 94 MG/DL (ref 40–75)
HDLC SERPL: 35.9 % (ref 20–50)
LDLC SERPL CALC-MCNC: 159 MG/DL (ref 63–159)
NONHDLC SERPL-MCNC: 168 MG/DL
TRIGL SERPL-MCNC: 45 MG/DL (ref 30–150)

## 2024-09-04 PROCEDURE — 36415 COLL VENOUS BLD VENIPUNCTURE: CPT | Mod: HCNC,PN | Performed by: INTERNAL MEDICINE

## 2024-09-04 PROCEDURE — 80061 LIPID PANEL: CPT | Mod: HCNC | Performed by: INTERNAL MEDICINE

## 2024-09-13 ENCOUNTER — PATIENT OUTREACH (OUTPATIENT)
Dept: ADMINISTRATIVE | Facility: HOSPITAL | Age: 69
End: 2024-09-13
Payer: MEDICARE

## 2024-09-24 ENCOUNTER — PATIENT MESSAGE (OUTPATIENT)
Dept: OPHTHALMOLOGY | Facility: CLINIC | Age: 69
End: 2024-09-24
Payer: MEDICARE

## 2024-09-30 ENCOUNTER — OFFICE VISIT (OUTPATIENT)
Dept: OTOLARYNGOLOGY | Facility: CLINIC | Age: 69
End: 2024-09-30
Payer: MEDICARE

## 2024-09-30 ENCOUNTER — CLINICAL SUPPORT (OUTPATIENT)
Dept: AUDIOLOGY | Facility: CLINIC | Age: 69
End: 2024-09-30
Payer: MEDICARE

## 2024-09-30 DIAGNOSIS — R94.120 FAILED HEARING SCREENING: ICD-10-CM

## 2024-09-30 DIAGNOSIS — H91.93 BILATERAL HEARING LOSS, UNSPECIFIED HEARING LOSS TYPE: ICD-10-CM

## 2024-09-30 DIAGNOSIS — H91.13 PRESBYCUSIS, BILATERAL: ICD-10-CM

## 2024-09-30 DIAGNOSIS — H83.3X2 NOISE-INDUCED HEARING LOSS OF LEFT EAR WITH RESTRICTED HEARING OF RIGHT EAR: Primary | ICD-10-CM

## 2024-09-30 DIAGNOSIS — H90.3 SENSORINEURAL HEARING LOSS OF BOTH EARS: Primary | ICD-10-CM

## 2024-09-30 PROCEDURE — 1159F MED LIST DOCD IN RCRD: CPT | Mod: HCNC,CPTII,S$GLB, | Performed by: OTOLARYNGOLOGY

## 2024-09-30 PROCEDURE — 99999 PR PBB SHADOW E&M-EST. PATIENT-LVL II: CPT | Mod: PBBFAC,HCNC,, | Performed by: AUDIOLOGIST

## 2024-09-30 PROCEDURE — 99203 OFFICE O/P NEW LOW 30 MIN: CPT | Mod: HCNC,S$GLB,, | Performed by: OTOLARYNGOLOGY

## 2024-09-30 PROCEDURE — 1126F AMNT PAIN NOTED NONE PRSNT: CPT | Mod: HCNC,CPTII,S$GLB, | Performed by: OTOLARYNGOLOGY

## 2024-09-30 PROCEDURE — 92567 TYMPANOMETRY: CPT | Mod: HCNC,S$GLB,, | Performed by: AUDIOLOGIST

## 2024-09-30 PROCEDURE — 92557 COMPREHENSIVE HEARING TEST: CPT | Mod: HCNC,S$GLB,, | Performed by: AUDIOLOGIST

## 2024-09-30 PROCEDURE — 99999 PR PBB SHADOW E&M-EST. PATIENT-LVL III: CPT | Mod: PBBFAC,HCNC,, | Performed by: OTOLARYNGOLOGY

## 2024-09-30 PROCEDURE — 3288F FALL RISK ASSESSMENT DOCD: CPT | Mod: HCNC,CPTII,S$GLB, | Performed by: OTOLARYNGOLOGY

## 2024-09-30 PROCEDURE — 1101F PT FALLS ASSESS-DOCD LE1/YR: CPT | Mod: HCNC,CPTII,S$GLB, | Performed by: OTOLARYNGOLOGY

## 2024-09-30 NOTE — PROGRESS NOTES
[facility]    Subjective:      Patient: Hugh Bates Patient PCP: Joseph Santana MD         :  1955     Sex:  male      MRN:  [id number]          Date of Visit: 2024      Chief Complaint: No chief complaint on file.      Patient ID: Hugh Bates is a 69 y.o. male     Patient is a  lifelong NON-smoker with no contributory past medical history  referred to me by Delma Sullivan in consultation for hearing loss.  Did a fair amount of hunting when he was much younger but nothing in recent years.  Can not identify any single sided acoustic or physical ear trauma.  No history of Bell's palsy or any focal neurologic deficits.  No history of any tinnitus or vertigo.    Audiogram today shows borderline/mild symmetric thresholds through 1500 hertz then mild-to-moderate symmetric hearing loss at 2000 hertz and then continued decline on the left side with recovery backup to symmetric hearing levels at 8000 hertz.  The right side does not exhibit the same degree of 0691-7215 hertz drop off.  Twenty-five dB SRT and 100% discrimination scores and normal type a tympanograms bilaterally.    No prior MRI.      Labs:  WBC   Date Value Ref Range Status   2023 2.86 (L) 3.90 - 12.70 K/uL Final     Hemoglobin   Date Value Ref Range Status   2023 14.2 14.0 - 18.0 g/dL Final     Platelets   Date Value Ref Range Status   2023 239 150 - 450 K/uL Final     Creatinine   Date Value Ref Range Status   2023 1.0 0.5 - 1.4 mg/dL Final     TSH   Date Value Ref Range Status   2023 1.669 0.400 - 4.000 uIU/mL Final     Hemoglobin A1C   Date Value Ref Range Status   2023 5.5 4.0 - 5.6 % Final     Comment:     ADA Screening Guidelines:  5.7-6.4%  Consistent with prediabetes  >or=6.5%  Consistent with diabetes    High levels of fetal hemoglobin interfere with the HbA1C  assay. Heterozygous hemoglobin variants (HbS, HgC, etc)do  not significantly interfere with this assay.   However,  presence of multiple variants may affect accuracy.         Past Medical History  He has a past medical history of Basal cell carcinoma and Urinary tract infection.    Family / Surgical / Social History  His family history includes Arthritis in his maternal grandmother; Cancer in his father; Cataracts in his mother; Colon polyps in his mother; Early death in his father; Hearing loss in his mother; No Known Problems in his brother, maternal aunt, maternal grandfather, maternal uncle, paternal aunt, paternal grandfather, paternal grandmother, paternal uncle, sister, sister, sister, and another family member; Squamous cell carcinoma in his mother.    Past Surgical History:   Procedure Laterality Date    COLONOSCOPY N/A 9/30/2016    Procedure: COLONOSCOPY;  Surgeon: George Virgen MD;  Location: [facility] (4TH [facility]);  Service: Colon and Rectal;  Laterality: N/A;    COLONOSCOPY      FRACTURE SURGERY  1974    broken thumb    REPAIR OF RETINAL DETACHMENT WITH VITRECTOMY Left 2/7/2024    Procedure: REPAIR, RETINAL DETACHMENT, WITH VITRECTOMY;  Surgeon: JOHN Ortiz MD;  [facility];  Service: Ophthalmology;  Laterality: Left;       Social History     Tobacco Use    Smoking status: Never    Smokeless tobacco: Never   Substance and Sexual Activity    Alcohol use: Yes     Alcohol/week: 7.0 standard drinks of alcohol     Types: 1 Glasses of wine, 6 Cans of beer per week    Drug use: Never    Sexual activity: Yes     Partners: Female     Birth control/protection: None       Medications  He has a current medication list which includes the following prescription(s): collagen, hydr (bovine) (bulk), fish,bora,flax oils-om3,6,9no1, glucosamine-chondroitin, loratadine, multivitamin, pneumovax-23, prednisol ace-gatiflox-bromfen, and rsvpref3 antigen-as01e (pf).      Allergies  Review of patient's allergies indicates:  No Known Allergies    All medications, allergies, and past history have been  "reviewed.    Objective:      Vitals:      6/7/2024    10:26 AM 8/8/2024     9:03 AM 8/21/2024    10:50 AM   Vitals - 1 value per visit   SYSTOLIC 118     DIASTOLIC 73     Pulse 52     Weight (lb) 153.77     Weight (kg) 69.75     Height 5' 9" (1.753 m)     BMI (Calculated) 22.7     Pain Score Zero Zero Zero       There is no height or weight on file to calculate BSA.    Physical Exam:    GENERAL  APPEARANCE -  alert, appears stated age, and cooperative  BARRIER(S) TO COMMUNICATION -  none VOICE - appropriate for age and gender    INTEGUMENTARY  no suspicious head and neck lesions    HEENT  HEAD: Normocephalic, without obvious abnormality, atraumatic  FACE: INSPECTION - Symmetric, no signs of trauma, no suspicious lesion(s)      STRENGTH - facial symmetry intact     EYES  Normal occular alignment and mobility with no visible nystagmus at rest    EARS/NOSE/MOUTH/THROAT  EARS  PINNAE AND EXTERNAL EARS - no suspicious lesion OTOSCOPIC EXAM (surgical microscopy was not used for visualization/instrumentation): EAR EXAM - Normal ear canals, tympanic membranes and mobility, and middle ear spaces bilaterally.  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  Surveillance of slight asymmetry which appears to be sensorineural noise induced pattern and not particularly suspicious of retrocochlear pathology.  Discussed the nature of asymmetric hearing loss and potential benefits of MRI which is not " specifically recommended at this time.  We have agreed that a six-month audiogram and surveillance every 1-2 years thereafter as appropriate.      Patient cleared to proceed with amplification/hearing aids.  The benefits of hearing aids discussed at length.    Follow up as needed          Voice recognition software was used in the creation of this note/communication and any sound-alike errors which may have occurred from its use should be taken in context when interpreting.  If such errors prevent a clear understanding of the note/communication, please contact the office for clarification.

## 2024-09-30 NOTE — PROGRESS NOTES
Mr. Hugh Bates was seen in the clinic today for an audiological evaluation.  Mr. Bates reported decreased hearing in both ears. He denied tinnitus, aural fullness, otalgia and dizziness.    Audiological testing revealed normal hearing sloping to moderate sensorineural hearing loss (SNHL) for the right ear and normal hearing sloping to moderately severe rising to moderate SNHL for the left ear.  A speech reception threshold was obtained at 25 dBHL for the right ear and at 25 dBHL for the left ear.  Speech discrimination was 100% for the right ear and 100% for the left ear.      Tympanometry testing revealed a Type A tympanogram for the right ear and a Type A tympanogram for the left ear.      Recommendations:  1. Otologic evaluation  2. Annual audiological evaluation  3. Hearing protection when in noise   4. Hearing aid consultation following medical clearance

## 2024-10-02 ENCOUNTER — TELEPHONE (OUTPATIENT)
Dept: OPHTHALMOLOGY | Facility: CLINIC | Age: 69
End: 2024-10-02
Payer: MEDICARE

## 2024-10-02 ENCOUNTER — PATIENT MESSAGE (OUTPATIENT)
Dept: OPHTHALMOLOGY | Facility: CLINIC | Age: 69
End: 2024-10-02
Payer: MEDICARE

## 2024-10-02 NOTE — TELEPHONE ENCOUNTER
Patient given arrival time of 8:00 am on Monday September 7 . Nothing to eat or drink after 11:59 pm.  Start drops into the operative eye Saturday. [address] [facility]

## 2024-10-04 NOTE — PRE-PROCEDURE INSTRUCTIONS
Unable to reach pt via phone.  Left voicemail with arrival time also informing pt of need for responsible  accompaniment and instructing pt to follow pre-procedure instructions provided via [facility] portal.  The following message was sent to pt's portal.        Dear Hugh     Below you will find basic pre-procedure instructions in preparation for your procedure on 10/7/24 with Dr. Cisneros        You should receive your arrival time within 24-48 hours of your scheduled procedure date from the  at your surgeon's office.     -Nothing to eat or drink after midnight the night before your procedure until after your procedure, except AM meds with small sips of water.     - HOLD all oral Diabetic medications night before and morning of procedure  - HOLD all Insulin morning of procedure  - HOLD all Fluid pills morning of procedure  - HOLD all non-insulin shots until after surgery (Ozempic, Mounjaro, Trulicity, Victoza, Byetta, Wegovy and Adlyxin) (7 days prior)  - HOLD all vitamins, minerals and herbal supplements morning of procedure   - TAKE all B/P meds, EXCEPT those that contain a fluid pill (ex. Lasix, Hydroclorothiazide/HCTZ, Spirnolactone)  - USE inhalers as needed and bring AM of surgery  - USE EYE DROPS as directed  -TAKE blood thinner meds AM of surgery unless otherwise instructed by your provider to not take     - Shower and wash face with antibacterial soap (ex. Dial) for 3 mins PM prior and AM of surgery  - No powder, lotions, creams, oils, gels, ointments, makeup,  or jewelry    - Wear comfortable clothing (button up shirt)     (Patient is required to have a responsible ride to transport home, ride may not leave while patient is in surgery)     -- [facility], 2nd floor Surgery Center, located   @ [address] [place] [id number]  2nd Floor Registration        If you have any questions or concerns please feel free to contact your surgeon's office.           Please reply  to this message as receipt of delivery.     Catina, LPN [facility]  Pre-Admit - Anesthesia Dept

## 2024-10-07 ENCOUNTER — HOSPITAL ENCOUNTER (OUTPATIENT)
Facility: HOSPITAL | Age: 69
Discharge: HOME | End: 2024-10-07
Attending: OPHTHALMOLOGY | Admitting: OPHTHALMOLOGY
Payer: MEDICARE

## 2024-10-07 ENCOUNTER — TELEPHONE (OUTPATIENT)
Dept: INTERNAL MEDICINE | Facility: CLINIC | Age: 69
End: 2024-10-07
Payer: MEDICARE

## 2024-10-07 ENCOUNTER — PATIENT MESSAGE (OUTPATIENT)
Dept: INTERNAL MEDICINE | Facility: CLINIC | Age: 69
End: 2024-10-07
Payer: MEDICARE

## 2024-10-07 VITALS
HEART RATE: 42 BPM | TEMPERATURE: 98 F | RESPIRATION RATE: 16 BRPM | WEIGHT: 153 LBS | SYSTOLIC BLOOD PRESSURE: 144 MMHG | BODY MASS INDEX: 22.66 KG/M2 | OXYGEN SATURATION: 100 % | DIASTOLIC BLOOD PRESSURE: 70 MMHG | HEIGHT: 69 IN

## 2024-10-07 DIAGNOSIS — H25.13 NUCLEAR SCLEROSIS, BILATERAL: ICD-10-CM

## 2024-10-07 DIAGNOSIS — H25.12 NUCLEAR SCLEROTIC CATARACT OF LEFT EYE: Primary | ICD-10-CM

## 2024-10-07 PROCEDURE — 25000003 PHARM REV CODE 250: Performed by: OPHTHALMOLOGY

## 2024-10-07 PROCEDURE — 99152 MOD SED SAME PHYS/QHP 5/>YRS: CPT | Mod: ,,, | Performed by: OPHTHALMOLOGY

## 2024-10-07 PROCEDURE — 99900035 HC TECH TIME PER 15 MIN (STAT)

## 2024-10-07 PROCEDURE — 63600175 PHARM REV CODE 636 W HCPCS: Performed by: OPHTHALMOLOGY

## 2024-10-07 PROCEDURE — 99152 MOD SED SAME PHYS/QHP 5/>YRS: CPT | Performed by: OPHTHALMOLOGY

## 2024-10-07 PROCEDURE — 94761 N-INVAS EAR/PLS OXIMETRY MLT: CPT

## 2024-10-07 PROCEDURE — 36000707: Performed by: OPHTHALMOLOGY

## 2024-10-07 PROCEDURE — 36000706: Performed by: OPHTHALMOLOGY

## 2024-10-07 PROCEDURE — V2787 ASTIGMATISM-CORRECT FUNCTION: HCPCS | Performed by: OPHTHALMOLOGY

## 2024-10-07 PROCEDURE — 99153 MOD SED SAME PHYS/QHP EA: CPT | Performed by: OPHTHALMOLOGY

## 2024-10-07 PROCEDURE — 66984 XCAPSL CTRC RMVL W/O ECP: CPT | Mod: LT,,, | Performed by: OPHTHALMOLOGY

## 2024-10-07 PROCEDURE — 71000015 HC POSTOP RECOV 1ST HR: Performed by: OPHTHALMOLOGY

## 2024-10-07 PROCEDURE — 66999 UNLISTED PX ANT SEGMENT EYE: CPT | Mod: CSM,,, | Performed by: OPHTHALMOLOGY

## 2024-10-07 DEVICE — IMPLANTABLE DEVICE: Type: IMPLANTABLE DEVICE | Site: EYE | Status: FUNCTIONAL

## 2024-10-07 RX ORDER — PHENYLEPHRINE HYDROCHLORIDE 100 MG/ML
1 SOLUTION/ DROPS OPHTHALMIC
Status: DISCONTINUED | OUTPATIENT
Start: 2024-10-07 | End: 2024-10-07 | Stop reason: HOSPADM

## 2024-10-07 RX ORDER — MOXIFLOXACIN 5 MG/ML
1 SOLUTION/ DROPS OPHTHALMIC
Status: COMPLETED | OUTPATIENT
Start: 2024-10-07 | End: 2024-10-07

## 2024-10-07 RX ORDER — CYCLOP/TROP/PROPA/PHEN/KET/WAT 1-1-0.1%
1 DROPS (EA) OPHTHALMIC (EYE) EVERY 5 MIN PRN
Status: COMPLETED | OUTPATIENT
Start: 2024-10-07 | End: 2024-10-07

## 2024-10-07 RX ORDER — TETRACAINE HYDROCHLORIDE 5 MG/ML
1 SOLUTION OPHTHALMIC
Status: DISCONTINUED | OUTPATIENT
Start: 2024-10-07 | End: 2024-10-07

## 2024-10-07 RX ORDER — LIDOCAINE HYDROCHLORIDE 40 MG/ML
INJECTION, SOLUTION RETROBULBAR
Status: DISCONTINUED | OUTPATIENT
Start: 2024-10-07 | End: 2024-10-07 | Stop reason: HOSPADM

## 2024-10-07 RX ORDER — PROPARACAINE HYDROCHLORIDE 5 MG/ML
SOLUTION/ DROPS OPHTHALMIC
Status: DISCONTINUED | OUTPATIENT
Start: 2024-10-07 | End: 2024-10-07 | Stop reason: HOSPADM

## 2024-10-07 RX ORDER — PROPARACAINE HYDROCHLORIDE 5 MG/ML
1 SOLUTION/ DROPS OPHTHALMIC
Status: DISCONTINUED | OUTPATIENT
Start: 2024-10-07 | End: 2024-10-07 | Stop reason: HOSPADM

## 2024-10-07 RX ORDER — SODIUM CHLORIDE 0.9 % (FLUSH) 0.9 %
10 SYRINGE (ML) INJECTION
Status: DISCONTINUED | OUTPATIENT
Start: 2024-10-07 | End: 2024-10-07 | Stop reason: HOSPADM

## 2024-10-07 RX ORDER — MOXIFLOXACIN 5 MG/ML
SOLUTION/ DROPS OPHTHALMIC
Status: DISCONTINUED | OUTPATIENT
Start: 2024-10-07 | End: 2024-10-07 | Stop reason: HOSPADM

## 2024-10-07 RX ORDER — MIDAZOLAM HYDROCHLORIDE 1 MG/ML
1 INJECTION, SOLUTION INTRAMUSCULAR; INTRAVENOUS
Status: DISCONTINUED | OUTPATIENT
Start: 2024-10-07 | End: 2024-10-07 | Stop reason: HOSPADM

## 2024-10-07 RX ORDER — PHENYLEPHRINE HYDROCHLORIDE 25 MG/ML
1 SOLUTION/ DROPS OPHTHALMIC
Status: DISCONTINUED | OUTPATIENT
Start: 2024-10-07 | End: 2024-10-07

## 2024-10-07 RX ORDER — TROPICAMIDE 10 MG/ML
1 SOLUTION/ DROPS OPHTHALMIC
Status: DISCONTINUED | OUTPATIENT
Start: 2024-10-07 | End: 2024-10-07

## 2024-10-07 RX ORDER — ACETAMINOPHEN 325 MG/1
650 TABLET ORAL EVERY 4 HOURS PRN
Status: DISCONTINUED | OUTPATIENT
Start: 2024-10-07 | End: 2024-10-07 | Stop reason: HOSPADM

## 2024-10-07 RX ADMIN — MOXIFLOXACIN 1 DROP: 5 SOLUTION/ DROPS OPHTHALMIC at 09:10

## 2024-10-07 RX ADMIN — Medication 1 DROP: at 08:10

## 2024-10-07 RX ADMIN — MOXIFLOXACIN OPHTHALMIC 1 DROP: 5 SOLUTION/ DROPS OPHTHALMIC at 08:10

## 2024-10-07 NOTE — TELEPHONE ENCOUNTER
Called, LVM, and sent my portal message in regards to patient's knee pain. Informed patient that his knee pain would be best evaluated in person and it can be addressed at his annual apt with Dr. Santana or if he'd like to be seen sooner with a NP. Instructed patient to reach back out with his decision.

## 2024-10-07 NOTE — DISCHARGE INSTRUCTIONS
CATARACT SURGERY    POST-OPERATIVE INSTRUCTIONS    · Apply drops THREE times a day into operative eye for 30 days.    · DO NOT rub your eye    · Wear protective sunglasses during the day    · Resume moderate activity    · Bathe/shower/wash face normally    · DO NOT apply makeup around the operative eye for 1 week.         You should expect    - Blurry vision and halos for 24-48 hours    - Dilated pupil for 24-48 hours    - Scratchy feeling in the eye for 1-2 days    - Curved shadow in your peripheral vision for 2-3 weeks    - Occasional flickering of lights for up to 1 week    -If you experience severe pain or nausea, please call Dr Cisneros or the on-call doctor at [phone]    - Plan to see Dr Cisneros tomorrow .      [facility]    [address] [facility] [address]    ** Most patients can drive the next day, but if you do not feel comfortable driving, please arrange for transportation.

## 2024-10-07 NOTE — OP NOTE
SURGEON:  Sepideh Cisneros M.D.    PREOPERATIVE DIAGNOSIS:    Nuclear Sclerotic Cataract Left Eye    POSTOPERATIVE DIAGNOSIS:    Nuclear Sclerotic Cataract Left  Eye    PROCEDURES:    Phacoemulsification with  intraocular lens, Left eye (40288)  With ORA LASER assist  With moderate sedation >10min (91415)    DATE OF SURGERY: 10/07/2024    IMPLANT: [id number] 15.0     ANESTHESIA:  Under my direct supervision, intravenous moderate sedation was administered during the course of this procedure, with continuous monitoring of hemodynamic parameters. Total time of sedation and amount of sedatives are documented in the nursing logs.    COMPLICATIONS:  None    ESTIMATED BLOOD LOSS: None    SPECIMENS: None    INDICATIONS:    The patient has a history of painless progressive visual loss and difficulty with activities of daily living, which specifically include difficult driving at night due to glare and difficulty reading small print, secondary to cataract formation.  After a thorough discussion of the risks, benefits, and alternatives to cataract surgery, including, but not limited to, the rare risks of infection, retinal detachment, hemorrhage, need for additional surgery, loss of vision, and even loss of the eye, the patient voices understanding and desires to proceed.    DESCRIPTION OF PROCEDURE:      The patients IOL calculations were reviewed, and the lens selection confirmed.   After verification and marking of the proper eye in the preop holding area, the patient was brought to the operating room in supine position where the eye was prepped and draped in standard sterile fashion with 5% Betadine and a lid speculum placed in the eye.   Topical 4% Lidocaine was used in addition to the preoperative anesthesia and the procedure was begun by the creation of a paracentesis incision through which viscoelastic was used to fill the anterior chamber.  Next, a keratome blade was used to create a triplanar temporal clear corneal  incision and a cystotome and Utrata forceps used to fashion a continuous curvilinear capsulorrhexis.  Hydrodissection was carried out using the Marie hydrodissection cannula and the nucleus was found to be mobile.  Phacoemulsification of the nucleus was carried out using a quick chop technique, and all remaining epinuclear and cortical material was removed.  The eye was then reformed with Viscoelastic and ORA was used to verify the proper IOL power. The intraocular lens was then implanted into the capsular bag.  All remaining viscoelastics were removed from the eye and at the end of the case the pupil was round, the lens was well-centered within the capsular bag and all wounds were found to be water tight.  Drops of Vigamox and Pred Forte were instilled and a shield was placed over the eye. The patient will follow up with Dr. Cisneros in the morning.

## 2024-10-07 NOTE — DISCHARGE SUMMARY
Outcome: Successful outpatient ophthalmic surgical procedure  Preprinted Instructions given to patient.  Regular diet.  Activity: No restrictions  Meds: see Med Rec  Condition: stable  Follow up: 1 day with Dr Cisneros  Disposition: Home  Diagnosis: s/p eye surgery  Date of discharge: 10/07/2024

## 2024-10-07 NOTE — PLAN OF CARE
Hugh Batse has met all discharge criteria from Phase II. Vital Signs are stable, ambulating  without difficulty. Discharge instructions given, patient verbalized understanding. Discharged from facility via wheelchair in stable condition.

## 2024-10-07 NOTE — TELEPHONE ENCOUNTER
----- Message from Susanne sent at 10/7/2024  2:45 PM CDT -----  Regarding: missed call  Name of caller:christina       What is the requesting detail: pt is returning a call. Please advise       Can the clinic reply by [facility]:       What number to call back: [phone]

## 2024-10-08 ENCOUNTER — OFFICE VISIT (OUTPATIENT)
Dept: OPHTHALMOLOGY | Facility: CLINIC | Age: 69
End: 2024-10-08
Payer: MEDICARE

## 2024-10-08 DIAGNOSIS — H25.12 NUCLEAR SCLEROSIS OF LEFT EYE: ICD-10-CM

## 2024-10-08 DIAGNOSIS — Z98.890 POST-OPERATIVE STATE: Primary | ICD-10-CM

## 2024-10-08 PROCEDURE — 1160F RVW MEDS BY RX/DR IN RCRD: CPT | Mod: CPTII,S$GLB,, | Performed by: OPHTHALMOLOGY

## 2024-10-08 PROCEDURE — 1159F MED LIST DOCD IN RCRD: CPT | Mod: CPTII,S$GLB,, | Performed by: OPHTHALMOLOGY

## 2024-10-08 PROCEDURE — 1126F AMNT PAIN NOTED NONE PRSNT: CPT | Mod: CPTII,S$GLB,, | Performed by: OPHTHALMOLOGY

## 2024-10-08 PROCEDURE — 3288F FALL RISK ASSESSMENT DOCD: CPT | Mod: CPTII,S$GLB,, | Performed by: OPHTHALMOLOGY

## 2024-10-08 PROCEDURE — 99024 POSTOP FOLLOW-UP VISIT: CPT | Mod: S$GLB,,, | Performed by: OPHTHALMOLOGY

## 2024-10-08 PROCEDURE — 1101F PT FALLS ASSESS-DOCD LE1/YR: CPT | Mod: CPTII,S$GLB,, | Performed by: OPHTHALMOLOGY

## 2024-10-08 PROCEDURE — 99999 PR PBB SHADOW E&M-EST. PATIENT-LVL III: CPT | Mod: PBBFAC,,, | Performed by: OPHTHALMOLOGY

## 2024-10-08 NOTE — PROGRESS NOTES
HPI    10/07/2024 IMPLANT: [id number] 15.0  OS    Patient is here today for 1 day phaco OS. Vision is doing well. No pain or   discomfort.     PMB TID OS    Last edited by Dayami Tapia on 10/8/2024  9:19 AM.            Assessment /Plan     For exam results, see Encounter Report.    Post-operative state    Nuclear sclerosis of left eye      Slit lamp exam:  L/L: nl  K: clear, wound sealed  AC: 1+ cell  Lens: IOL centered and stable    POD1 s/p Phaco/IOL  Appropriate precautions and post op medications reviewed.  Patient instructed to call or come in if symptoms of redness, decreased vision, or pain are experienced.

## 2024-10-09 ENCOUNTER — TELEPHONE (OUTPATIENT)
Dept: SPORTS MEDICINE | Facility: CLINIC | Age: 69
End: 2024-10-09
Payer: MEDICARE

## 2024-10-09 ENCOUNTER — TELEPHONE (OUTPATIENT)
Dept: ORTHOPEDICS | Facility: CLINIC | Age: 69
End: 2024-10-09
Payer: MEDICARE

## 2024-10-09 ENCOUNTER — OFFICE VISIT (OUTPATIENT)
Dept: INTERNAL MEDICINE | Facility: CLINIC | Age: 69
End: 2024-10-09
Payer: MEDICARE

## 2024-10-09 VITALS
BODY MASS INDEX: 23.22 KG/M2 | DIASTOLIC BLOOD PRESSURE: 78 MMHG | OXYGEN SATURATION: 97 % | SYSTOLIC BLOOD PRESSURE: 118 MMHG | HEIGHT: 69 IN | HEART RATE: 57 BPM | WEIGHT: 156.75 LBS

## 2024-10-09 DIAGNOSIS — M25.561 RECURRENT PAIN OF RIGHT KNEE: Primary | ICD-10-CM

## 2024-10-09 DIAGNOSIS — M25.561 RIGHT KNEE PAIN, UNSPECIFIED CHRONICITY: Primary | ICD-10-CM

## 2024-10-09 PROCEDURE — 99213 OFFICE O/P EST LOW 20 MIN: CPT | Mod: HCNC,S$GLB,,

## 2024-10-09 PROCEDURE — 3288F FALL RISK ASSESSMENT DOCD: CPT | Mod: HCNC,CPTII,S$GLB,

## 2024-10-09 PROCEDURE — 3008F BODY MASS INDEX DOCD: CPT | Mod: HCNC,CPTII,S$GLB,

## 2024-10-09 PROCEDURE — 3078F DIAST BP <80 MM HG: CPT | Mod: HCNC,CPTII,S$GLB,

## 2024-10-09 PROCEDURE — 1159F MED LIST DOCD IN RCRD: CPT | Mod: HCNC,CPTII,S$GLB,

## 2024-10-09 PROCEDURE — 1101F PT FALLS ASSESS-DOCD LE1/YR: CPT | Mod: HCNC,CPTII,S$GLB,

## 2024-10-09 PROCEDURE — 1126F AMNT PAIN NOTED NONE PRSNT: CPT | Mod: HCNC,CPTII,S$GLB,

## 2024-10-09 PROCEDURE — 99999 PR PBB SHADOW E&M-EST. PATIENT-LVL III: CPT | Mod: PBBFAC,HCNC,,

## 2024-10-09 PROCEDURE — 3074F SYST BP LT 130 MM HG: CPT | Mod: HCNC,CPTII,S$GLB,

## 2024-10-09 NOTE — PROGRESS NOTES
CHIEF COMPLAINT     Chief Complaint   Patient presents with    Knee Pain       HPI     Hugh Bates is a 69 y.o. male who presents for xxx today.    PCP is Joseph Santana MD, patient is new to me. Pt consents to  recording of visit for documentation purposes.     History of Present Illness    CHIEF COMPLAINT:  Hugh presents today for evaluation of knee pain.    KNEE PAIN AND EXERCISE HISTORY:  He reports a 23-year history of knee pain, initially from basketball, which recently exacerbated after hiking in [place] and running a 5-mile [place] race. He describes a 'stabbing' sensation during rolling exercises, even on padded surfaces. He wears a compression sleeve during exercise for joint stabilization and pain reduction. He has a 30-40 year history of jogging on grass and levees, and now plays pickleball instead of basketball. Since the [place] race, he has continued light jogging and some speed work at approximately 70% intensity, noting he can still jog despite current discomfort.    PREVIOUS MEDICAL CARE:  Approximately 23 years ago, he consulted Dr. Coulter, an orthopedist, for knee pain following a basketball injury. Dr. Coulter advised strengthening the muscles around the knee and suggested a follow-up MRI if symptoms persisted. The pain subsequently resolved with only occasional recurrence.    OTHER MUSCULOSKELETAL ISSUES:  He reports pulling a muscle while weightlifting but believes it will resolve on its own without specific treatment or medication.    PAIN MANAGEMENT:  He denies regular use of pain medication, citing high pain tolerance. He occasionally uses ibuprofen if needed and applies ice for pain management, particularly after physical activities such as hiking or running, noting significant improvement in recovery.    UPCOMING APPOINTMENTS AND CONCERNS:  He has an appointment with Dr. May scheduled for November 7th. He expresses a desire to determine his physical  limitations and is considering high dose statin therapy, though he reports previously discussing low dose options.      ROS:  General: -fever, -chills, -fatigue, -weight gain, -weight loss  Eyes: -vision changes, -redness, -discharge  ENT: -ear pain, -nasal congestion, -sore throat  Cardiovascular: -chest pain, -palpitations, -lower extremity edema  Respiratory: -cough, -shortness of breath  Gastrointestinal: -abdominal pain, -nausea, -vomiting, -diarrhea, -constipation, -blood in stool  Genitourinary: -dysuria, -hematuria, -frequency  Musculoskeletal: +joint pain, +muscle pain  Skin: -rash, -lesion  Neurological: -headache, -dizziness, -numbness, -tingling  Psychiatric: -anxiety, -depression, -sleep difficulty          Past Medical History:  Past Medical History:   Diagnosis Date    Basal cell carcinoma     Urinary tract infection        Home Medications:  Prior to Admission medications    Medication Sig Start Date End Date Taking? Authorizing Provider   collagen, hydrolysate, bovine, (COLLAGEN, HYDR, BOVINE,, BULK,) 100 % Powd  2/1/20  Yes Provider, Historical   COVID-19 (COMIRNATY 2024-25, 12Y UP,,PF,) 30 mcg/0.3 mL IM vaccine (>/= 11 yo) Inject 0.3 mLs into the muscle once. for 1 dose 10/8/24 10/9/24 Yes Celi Decker, PharmD   fish,bora,flax oils-om3,6,9no1 400-400-400 mg Cap Take by mouth.   Yes Provider, Historical   glucosamine-chondroitin 500-400 mg tablet Take 2 tablets by mouth once daily.   Yes Provider, Historical   influenza, adjuvanted, (FLUAD TRIV 2024-25,65Y UP,,PF,) 45 mcg (15 mcg x 3)/0.5 mL IM vaccine (> or = 64 yo) Inject 0.5 mLs into the muscle once. for 1 dose 10/8/24 10/9/24 Yes Celi Decker, PharmD   loratadine (CLARITIN) 10 mg tablet  3/15/10  Yes Provider, Historical   multivitamin (THERAGRAN) per tablet Take 1 tablet by mouth once daily.   Yes Provider, Historical   PNEUMOVAX-23 25 mcg/0.5 mL vaccine  8/18/21  Yes Provider, Historical   prednisolon/gatiflox/bromfenac (PREDNISOL  "ACE-GATIFLOX-BROMFEN) 1-0.5-0.075 % DrpS Apply 1 drop to eye 3 (three) times daily. in operative eye for 1 month after surgery 8/21/24  Yes Sepideh Cisneros MD   RSVPreF3 antigen-AS01E, PF, (AREXVY) 120 mcg/0.5 mL SusR vaccine Inject 0.5mL into the muscle once 10/4/23  Yes Gerri Zelaya, PharmD       Review of Systems:  Review of Systems   Constitutional: Negative.    Respiratory: Negative.     Cardiovascular: Negative.    Musculoskeletal:  Positive for arthralgias.       Health Maintainence:   Immunizations:  Health Maintenance         Date Due Completion Date    High Dose Statin Never done ---    TETANUS VACCINE 12/14/2025 12/14/2015 (Done)    Override on 12/14/2015: Done    Colorectal Cancer Screening 09/30/2026 9/30/2016    Override on 5/16/2007: Done    Lipid Panel 09/04/2029 9/4/2024             PHYSICAL EXAM     /78   Pulse (!) 57   Ht 5' 9" (1.753 m)   Wt 71.1 kg (156 lb 12 oz)   SpO2 97%   BMI 23.15 kg/m²     Physical Exam  Vitals reviewed.   Constitutional:       Appearance: He is well-developed.   HENT:      Head: Normocephalic and atraumatic.   Eyes:      Conjunctiva/sclera: Conjunctivae normal.   Cardiovascular:      Rate and Rhythm: Normal rate.   Pulmonary:      Effort: Pulmonary effort is normal. No respiratory distress.   Skin:     General: Skin is warm and dry.      Findings: No rash.   Neurological:      Mental Status: He is alert and oriented to person, place, and time.      Coordination: Coordination normal.   Psychiatric:         Behavior: Behavior normal.         LABS     Lab Results   Component Value Date    HGBA1C 5.5 11/03/2023     CMP  Sodium   Date Value Ref Range Status   11/03/2023 133 (L) 136 - 145 mmol/L Final     Potassium   Date Value Ref Range Status   11/03/2023 5.4 (H) 3.5 - 5.1 mmol/L Final     Comment:     *No Visible Hemolysis     Chloride   Date Value Ref Range Status   11/03/2023 100 95 - 110 mmol/L Final     CO2   Date Value Ref Range Status   11/03/2023 24 23 - " 29 mmol/L Final     Glucose   Date Value Ref Range Status   11/03/2023 90 70 - 110 mg/dL Final     BUN   Date Value Ref Range Status   11/03/2023 18 8 - 23 mg/dL Final     Creatinine   Date Value Ref Range Status   11/03/2023 1.0 0.5 - 1.4 mg/dL Final     Calcium   Date Value Ref Range Status   11/03/2023 9.5 8.7 - 10.5 mg/dL Final     Total Protein   Date Value Ref Range Status   11/03/2023 6.5 6.0 - 8.4 g/dL Final     Albumin   Date Value Ref Range Status   11/03/2023 3.8 3.5 - 5.2 g/dL Final     Total Bilirubin   Date Value Ref Range Status   11/03/2023 0.7 0.1 - 1.0 mg/dL Final     Comment:     For infants and newborns, interpretation of results should be based  on gestational age, weight and in agreement with clinical  observations.    Premature Infant recommended reference ranges:  Up to 24 hours.............<8.0 mg/dL  Up to 48 hours............<12.0 mg/dL  3-5 days..................<15.0 mg/dL  6-29 days.................<15.0 mg/dL       Alkaline Phosphatase   Date Value Ref Range Status   11/03/2023 46 (L) 55 - 135 U/L Final     AST   Date Value Ref Range Status   11/03/2023 39 10 - 40 U/L Final     ALT   Date Value Ref Range Status   11/03/2023 34 10 - 44 U/L Final     Anion Gap   Date Value Ref Range Status   11/03/2023 9 8 - 16 mmol/L Final     eGFR if    Date Value Ref Range Status   12/07/2021 >60 >60 mL/min/1.73 m^2 Final     eGFR if non    Date Value Ref Range Status   12/07/2021 >60 >60 mL/min/1.73 m^2 Final     Comment:     Calculation used to obtain the estimated glomerular filtration  rate (eGFR) is the CKD-EPI equation.        Lab Results   Component Value Date    WBC 2.86 (L) 11/03/2023    HGB 14.2 11/03/2023    HCT 42.6 11/03/2023    MCV 96 11/03/2023     11/03/2023     Lab Results   Component Value Date    CHOL 262 (H) 09/04/2024    CHOL 282 (H) 11/03/2023    CHOL 259 (H) 12/05/2022     Lab Results   Component Value Date    HDL 94 (H) 09/04/2024    HDL  103 (H) 11/03/2023    HDL 95 (H) 12/05/2022     Lab Results   Component Value Date    LDLCALC 159.0 09/04/2024    LDLCALC 171.0 (H) 11/03/2023    LDLCALC 154.4 12/05/2022     Lab Results   Component Value Date    TRIG 45 09/04/2024    TRIG 40 11/03/2023    TRIG 48 12/05/2022     Lab Results   Component Value Date    CHOLHDL 35.9 09/04/2024    CHOLHDL 36.5 11/03/2023    CHOLHDL 36.7 12/05/2022     Lab Results   Component Value Date    TSH 1.669 11/03/2023       ASSESSMENT/PLAN   Assessment & Plan    Suspected meniscus wear with possible ragged edges or bone fragments causing knee pain  Considered orthopedic evaluation and imaging to assess knee condition and determine appropriate treatment options  Discussed potential for arthroscopic procedure if meniscus damage confirmed    KNEE PAIN:  - Educated on benefits of icing after physical activity to reduce inflammation and improve recovery time.  - Discussed advantages of using compression sleeves during exercise to stabilize the joint and potentially alleviate pain.  - Apply ice pack for 10-15 minutes after strenuous activities like hiking or running.  - Consider using compression sleeve during exercise for joint stabilization.  - Recommend OTC NSAIDs (e.g., ibuprofen) as needed for pain management.  - Offered to prescribe mild muscle relaxer for bedtime use if muscle pain persists, to be taken for 1-2 weeks.  - Referred to orthopedics for evaluation of knee pain, including appropriate imaging studies.    FOLLOW UP:  - Hugh to contact office through portal if any additional needs arise.          Hugh was seen today for knee pain.    Diagnoses and all orders for this visit:    Recurrent pain of right knee  -     Ambulatory referral/consult to Orthopedics; Future          Scott Shrestha NP   Department of Internal Medicine - [facility]  9:41 AM

## 2024-10-14 ENCOUNTER — OFFICE VISIT (OUTPATIENT)
Dept: SPORTS MEDICINE | Facility: CLINIC | Age: 69
End: 2024-10-14
Payer: MEDICARE

## 2024-10-14 ENCOUNTER — HOSPITAL ENCOUNTER (OUTPATIENT)
Dept: RADIOLOGY | Facility: HOSPITAL | Age: 69
Discharge: HOME | End: 2024-10-14
Attending: ORTHOPAEDIC SURGERY
Payer: MEDICARE

## 2024-10-14 VITALS — WEIGHT: 156.31 LBS | BODY MASS INDEX: 23.15 KG/M2 | HEIGHT: 69 IN

## 2024-10-14 DIAGNOSIS — M25.561 RECURRENT PAIN OF RIGHT KNEE: ICD-10-CM

## 2024-10-14 DIAGNOSIS — M65.261 CALCIFIC TENDINITIS OF RIGHT KNEE: Primary | ICD-10-CM

## 2024-10-14 DIAGNOSIS — M25.561 RIGHT KNEE PAIN, UNSPECIFIED CHRONICITY: ICD-10-CM

## 2024-10-14 PROCEDURE — 3288F FALL RISK ASSESSMENT DOCD: CPT | Mod: HCNC,CPTII,S$GLB, | Performed by: ORTHOPAEDIC SURGERY

## 2024-10-14 PROCEDURE — 73564 X-RAY EXAM KNEE 4 OR MORE: CPT | Mod: 26,50,HCNC, | Performed by: RADIOLOGY

## 2024-10-14 PROCEDURE — 73564 X-RAY EXAM KNEE 4 OR MORE: CPT | Mod: TC,50,HCNC,PN

## 2024-10-14 PROCEDURE — 1125F AMNT PAIN NOTED PAIN PRSNT: CPT | Mod: HCNC,CPTII,S$GLB, | Performed by: ORTHOPAEDIC SURGERY

## 2024-10-14 PROCEDURE — 1160F RVW MEDS BY RX/DR IN RCRD: CPT | Mod: HCNC,CPTII,S$GLB, | Performed by: ORTHOPAEDIC SURGERY

## 2024-10-14 PROCEDURE — 1159F MED LIST DOCD IN RCRD: CPT | Mod: HCNC,CPTII,S$GLB, | Performed by: ORTHOPAEDIC SURGERY

## 2024-10-14 PROCEDURE — 99214 OFFICE O/P EST MOD 30 MIN: CPT | Mod: HCNC,S$GLB,, | Performed by: ORTHOPAEDIC SURGERY

## 2024-10-14 PROCEDURE — 3008F BODY MASS INDEX DOCD: CPT | Mod: HCNC,CPTII,S$GLB, | Performed by: ORTHOPAEDIC SURGERY

## 2024-10-14 PROCEDURE — 99999 PR PBB SHADOW E&M-EST. PATIENT-LVL III: CPT | Mod: PBBFAC,HCNC,, | Performed by: ORTHOPAEDIC SURGERY

## 2024-10-14 PROCEDURE — 1101F PT FALLS ASSESS-DOCD LE1/YR: CPT | Mod: HCNC,CPTII,S$GLB, | Performed by: ORTHOPAEDIC SURGERY

## 2024-10-14 NOTE — PROGRESS NOTES
"Subjective:     Chief Complaint: Hugh Bates is a 69 y.o. male who had concerns including Pain of the Right Knee.    HPI    Patient who is an avid runner and pickleball player presents to clinic with chronic right knee pain x several years. Patient states he was always had mild knee pain ever since an injury about 20 years ago.  This is always been very tolerable, but has increased slightly in severity in the past few months following a strenuous hike.  Pain is localized suprapatellar in this usually "achy" following periods of increased activity.  He rates this pain as 1/10.  He has attempted multiple conservative measures that include activity modification, ice & elevation, and oral medications (anti-inflammatories), with relief. He denies any mechanical symptoms to include locking and catching or instability.  Denies numbness, tingling, radiation, and inability to bear weight. He is here today to discuss treatment options.    No previous surgeries or trauma on bilateral knees    Review of Systems   Constitutional: Negative.   HENT: Negative.     Eyes: Negative.    Cardiovascular: Negative.    Respiratory: Negative.     Endocrine: Negative.    Hematologic/Lymphatic: Negative.    Skin: Negative.    Musculoskeletal:  Positive for joint pain. Negative for arthritis, falls, joint swelling, muscle weakness, myalgias and stiffness.   Neurological: Negative.    Psychiatric/Behavioral: Negative.     Allergic/Immunologic: Negative.                  Objective:     General: Hugh is well-developed, well-nourished, appears stated age, in no acute distress, alert and oriented to time, place and person.     General    Nursing note and vitals reviewed.  Constitutional: He is oriented to person, place, and time. He appears well-developed and well-nourished. No distress.   HENT:   Head: Normocephalic and atraumatic.   Nose: Nose normal.   Eyes: EOM are normal.   Cardiovascular:  Intact distal pulses.          "   Pulmonary/Chest: Effort normal. No respiratory distress.   Neurological: He is alert and oriented to person, place, and time.   Psychiatric: He has a normal mood and affect. His behavior is normal. Judgment and thought content normal.           Right Knee Exam     Inspection   Erythema: absent  Scars: absent  Swelling: absent  Effusion: absent  Deformity: absent  Bruising: absent    Tenderness   The patient is experiencing no tenderness.     Range of Motion   Extension:  -5   Flexion:  140     Tests   Meniscus   Marian:  Medial - negative Lateral - negative  Ligament Examination   Lachman: normal (-1 to 2mm)   PCL-Posterior Drawer: normal (0 to 2mm)     MCL - Valgus: normal (0 to 2mm)  LCL - Varus: normal  Patella   Patellar apprehension: negative  Passive Patellar Tilt: neutral  Patellar Tracking: normal  Patellar Grind: negative    Other   Sensation: normal    Left Knee Exam     Inspection   Erythema: absent  Scars: absent  Swelling: absent  Effusion: absent  Deformity: absent  Bruising: absent    Tenderness   The patient is experiencing no tenderness.     Range of Motion   Extension:  -5   Flexion:  140     Tests   Meniscus   Marian:  Medial - negative Lateral - negative  Stability   Lachman: normal (-1 to 2mm)   PCL-Posterior Drawer: normal (0 to 2mm)  MCL - Valgus: normal (0 to 2mm)  LCL - Varus: normal (0 to 2mm)  Patella   Patellar apprehension: negative  Passive Patellar Tilt: neutral  Patellar Tracking: normal  Patellar Grind: negative    Other   Sensation: normal    Muscle Strength   Right Lower Extremity   Quadriceps:  5/5   Hamstrin/5   Left Lower Extremity   Quadriceps:  5/5   Hamstrin/5     Vascular Exam     Right Pulses  Dorsalis Pedis:      2+  Posterior Tibial:      2+        Left Pulses  Dorsalis Pedis:      2+  Posterior Tibial:      2+        Edema  Left Lower Leg: absent    Radiographs bilateral knee     My interpretation:     Increased calcification seen at the superior aspect  of the patella      Assessment:     Encounter Diagnoses   Name Primary?    Recurrent pain of right knee     Calcific tendinitis of right knee Yes        Plan:     1. I made the decision to order new imaging of the extremity or extremities evaluated. I independently reviewed and interpreted the radiographs and/or MRIs today. These images were shown to the patient where I then discussed my findings in detail.    2. We discussed at length different treatment options including conservative vs surgical management. These include anti-inflammatories, acetaminophen, rest, ice, heat, formal physical therapy including strengthening and stretching exercises, home exercise programs, dry needling, and finally surgical intervention.  Given the mild level of pain, patient would like to continue to manage these symptoms on his own.  We discussed knee strengthening exercises that he can perform on his own.    3. RTC to see Kirk millard. patient will contact our clinic in the future if symptoms become more severe and he would like to discuss alternative treatment options.      All of the patient's questions were answered. Patient was advised to call the clinic or contact me through the patient portal for any questions or concerns.       Medical Dictation software was used during the dictation of portions or the entirety of this medical record.  Phonetic or grammatic errors may exist due to the use of this software. For clarification, refer to the author of the document.        Patient questionnaires may have been collected.

## 2024-10-15 ENCOUNTER — OFFICE VISIT (OUTPATIENT)
Dept: OPHTHALMOLOGY | Facility: CLINIC | Age: 69
End: 2024-10-15
Payer: MEDICARE

## 2024-10-15 DIAGNOSIS — Z98.890 POST-OPERATIVE STATE: Primary | ICD-10-CM

## 2024-10-15 DIAGNOSIS — H25.12 NUCLEAR SCLEROSIS OF LEFT EYE: ICD-10-CM

## 2024-10-15 DIAGNOSIS — H25.11 NUCLEAR SCLEROSIS OF RIGHT EYE: ICD-10-CM

## 2024-10-15 PROCEDURE — 1126F AMNT PAIN NOTED NONE PRSNT: CPT | Mod: CPTII,S$GLB,, | Performed by: OPHTHALMOLOGY

## 2024-10-15 PROCEDURE — 1160F RVW MEDS BY RX/DR IN RCRD: CPT | Mod: CPTII,S$GLB,, | Performed by: OPHTHALMOLOGY

## 2024-10-15 PROCEDURE — 1159F MED LIST DOCD IN RCRD: CPT | Mod: CPTII,S$GLB,, | Performed by: OPHTHALMOLOGY

## 2024-10-15 PROCEDURE — 99024 POSTOP FOLLOW-UP VISIT: CPT | Mod: S$GLB,,, | Performed by: OPHTHALMOLOGY

## 2024-10-15 PROCEDURE — 99999 PR PBB SHADOW E&M-EST. PATIENT-LVL III: CPT | Mod: PBBFAC,,, | Performed by: OPHTHALMOLOGY

## 2024-10-15 RX ORDER — SODIUM CHLORIDE 0.9 % (FLUSH) 0.9 %
10 SYRINGE (ML) INJECTION
OUTPATIENT
Start: 2024-10-15

## 2024-10-15 RX ORDER — TROPICAMIDE 10 MG/ML
1 SOLUTION/ DROPS OPHTHALMIC
OUTPATIENT
Start: 2024-10-15

## 2024-10-15 RX ORDER — MOXIFLOXACIN 5 MG/ML
1 SOLUTION/ DROPS OPHTHALMIC
OUTPATIENT
Start: 2024-10-15

## 2024-10-15 RX ORDER — TETRACAINE HYDROCHLORIDE 5 MG/ML
1 SOLUTION OPHTHALMIC
OUTPATIENT
Start: 2024-10-15

## 2024-10-15 RX ORDER — PHENYLEPHRINE HYDROCHLORIDE 25 MG/ML
1 SOLUTION/ DROPS OPHTHALMIC
OUTPATIENT
Start: 2024-10-15

## 2024-10-15 RX ORDER — PHENYLEPHRINE HYDROCHLORIDE 100 MG/ML
1 SOLUTION/ DROPS OPHTHALMIC
OUTPATIENT
Start: 2024-10-15

## 2024-10-15 NOTE — PROGRESS NOTES
HPI    10/07/2024 IMPLANT: [id number] 15.0  OS    Patient is here today for 1 week phaco OS.     PMB TID OS  Refresh PRN OU    Last edited by Dayami Tapia on 10/15/2024  2:23 PM.            Assessment /Plan     For exam results, see Encounter Report.    Post-operative state    Nuclear sclerosis of left eye      Slit lamp exam:  L/L: nl  K: clear, wound sealed  AC: trace cell  Iris/Lens: IOL centered and stable    POW1 s/p phaco: Surgery healing well with no signs of infection or abnormal inflammation.    Patient wishes to proceed with surgery in the second eye. Risks, benefits, alternatives reviewed. IOL selection reviewed.    Right eye  IOL: [id number] 15.0       HX RD OS, MILD NSC OD  MYOPE, WEARS CTLS    The patient expresses a desire to reduce spectacle dependence. I reviewed various IOL and LASER refractive surgical options and we will attempt to minimize spectacle dependence by managing astigmatism and optimizing IOL selection. Customized Cataract Surgery with Vision Correction (CCVC) was explained to the patient with educational videos and discussion.  The patient voices understanding and wishes to implement this technology during the cataract procedure.  I explained the increased precision of the LASER versus manual techniques, especially as it relates to astigmatism reduction with arcuate incisions.  I emphasized that although our goal is to reduce the need for refractive correction (glasses or contacts) after surgery, there may still be a need for spectacle correction to achieve optimal visual acuity, and that a reasonable range of functional vision should be the expectation.  No guarantees are made about post operative refraction or visual acuity, as the eye may heal in unpredictable ways, and the standard risks, benefits, and alternatives to cataract surgery were explained.  The patient understands that the refractive portions of this cataract procedure are not covered by insurance, and that there  is an out of pocket expense of $2000 per eye. I also explained that even though our pre-operative plan is to utilize advanced refractive technologies during surgery, that I may decide to eliminate part or all of this plan if surgical challenges or complications arise, or I feel that it is not in the patient's best interest. Consent forms and an ABN form were given to the patient to review.    ANAIS ROA

## 2024-10-24 ENCOUNTER — TELEPHONE (OUTPATIENT)
Dept: OPHTHALMOLOGY | Facility: CLINIC | Age: 69
End: 2024-10-24
Payer: MEDICARE

## 2024-10-24 NOTE — TELEPHONE ENCOUNTER
Spoke with patient and gave arrival time of 7:00 am at [facility]. I went over instructions of nothing to eat after midnight, start drops on Saturday TID in sx eye, no lotions or creams around eye, and wear something comfortable.  Informed patient they must have someone with them that drives and be prepared to be here 3-4 hours. EB

## 2024-10-28 ENCOUNTER — HOSPITAL ENCOUNTER (OUTPATIENT)
Facility: HOSPITAL | Age: 69
Discharge: HOME | End: 2024-10-28
Attending: OPHTHALMOLOGY | Admitting: OPHTHALMOLOGY
Payer: MEDICARE

## 2024-10-28 ENCOUNTER — PATIENT MESSAGE (OUTPATIENT)
Dept: OPTOMETRY | Facility: CLINIC | Age: 69
End: 2024-10-28
Payer: MEDICARE

## 2024-10-28 VITALS
BODY MASS INDEX: 22.66 KG/M2 | HEIGHT: 69 IN | WEIGHT: 153 LBS | HEART RATE: 43 BPM | TEMPERATURE: 98 F | DIASTOLIC BLOOD PRESSURE: 70 MMHG | SYSTOLIC BLOOD PRESSURE: 127 MMHG | OXYGEN SATURATION: 99 % | RESPIRATION RATE: 16 BRPM

## 2024-10-28 DIAGNOSIS — H25.11 NUCLEAR SCLEROTIC CATARACT OF RIGHT EYE: Primary | ICD-10-CM

## 2024-10-28 DIAGNOSIS — H25.11 NUCLEAR SCLEROSIS OF RIGHT EYE: ICD-10-CM

## 2024-10-28 DIAGNOSIS — Z98.890 POST-OPERATIVE STATE: ICD-10-CM

## 2024-10-28 PROCEDURE — 36000707: Performed by: OPHTHALMOLOGY

## 2024-10-28 PROCEDURE — 36000706: Performed by: OPHTHALMOLOGY

## 2024-10-28 PROCEDURE — 99152 MOD SED SAME PHYS/QHP 5/>YRS: CPT | Mod: ,,, | Performed by: OPHTHALMOLOGY

## 2024-10-28 PROCEDURE — 63600175 PHARM REV CODE 636 W HCPCS: Performed by: OPHTHALMOLOGY

## 2024-10-28 PROCEDURE — 71000015 HC POSTOP RECOV 1ST HR: Performed by: OPHTHALMOLOGY

## 2024-10-28 PROCEDURE — 25000003 PHARM REV CODE 250: Performed by: OPHTHALMOLOGY

## 2024-10-28 PROCEDURE — 94761 N-INVAS EAR/PLS OXIMETRY MLT: CPT | Mod: 59

## 2024-10-28 PROCEDURE — 66984 XCAPSL CTRC RMVL W/O ECP: CPT | Mod: 79,RT,, | Performed by: OPHTHALMOLOGY

## 2024-10-28 PROCEDURE — V2787 ASTIGMATISM-CORRECT FUNCTION: HCPCS | Performed by: OPHTHALMOLOGY

## 2024-10-28 PROCEDURE — 99900035 HC TECH TIME PER 15 MIN (STAT)

## 2024-10-28 PROCEDURE — 99152 MOD SED SAME PHYS/QHP 5/>YRS: CPT | Performed by: OPHTHALMOLOGY

## 2024-10-28 DEVICE — IMPLANTABLE DEVICE: Type: IMPLANTABLE DEVICE | Site: EYE | Status: FUNCTIONAL

## 2024-10-28 RX ORDER — TROPICAMIDE 10 MG/ML
1 SOLUTION/ DROPS OPHTHALMIC
Status: DISCONTINUED | OUTPATIENT
Start: 2024-10-28 | End: 2024-10-28

## 2024-10-28 RX ORDER — LIDOCAINE HYDROCHLORIDE 40 MG/ML
INJECTION, SOLUTION RETROBULBAR
Status: DISCONTINUED | OUTPATIENT
Start: 2024-10-28 | End: 2024-10-28 | Stop reason: HOSPADM

## 2024-10-28 RX ORDER — SODIUM CHLORIDE 0.9 % (FLUSH) 0.9 %
10 SYRINGE (ML) INJECTION
Status: DISCONTINUED | OUTPATIENT
Start: 2024-10-28 | End: 2024-10-28 | Stop reason: HOSPADM

## 2024-10-28 RX ORDER — PROPARACAINE HYDROCHLORIDE 5 MG/ML
SOLUTION/ DROPS OPHTHALMIC
Status: DISCONTINUED | OUTPATIENT
Start: 2024-10-28 | End: 2024-10-28 | Stop reason: HOSPADM

## 2024-10-28 RX ORDER — MOXIFLOXACIN 5 MG/ML
1 SOLUTION/ DROPS OPHTHALMIC
Status: COMPLETED | OUTPATIENT
Start: 2024-10-28 | End: 2024-10-28

## 2024-10-28 RX ORDER — PHENYLEPHRINE HYDROCHLORIDE 25 MG/ML
1 SOLUTION/ DROPS OPHTHALMIC
Status: DISCONTINUED | OUTPATIENT
Start: 2024-10-28 | End: 2024-10-28

## 2024-10-28 RX ORDER — TETRACAINE HYDROCHLORIDE 5 MG/ML
1 SOLUTION OPHTHALMIC
Status: DISCONTINUED | OUTPATIENT
Start: 2024-10-28 | End: 2024-10-28

## 2024-10-28 RX ORDER — MOXIFLOXACIN 5 MG/ML
SOLUTION/ DROPS OPHTHALMIC
Status: DISCONTINUED | OUTPATIENT
Start: 2024-10-28 | End: 2024-10-28 | Stop reason: HOSPADM

## 2024-10-28 RX ORDER — PHENYLEPHRINE HYDROCHLORIDE 100 MG/ML
1 SOLUTION/ DROPS OPHTHALMIC
Status: DISCONTINUED | OUTPATIENT
Start: 2024-10-28 | End: 2024-10-28 | Stop reason: HOSPADM

## 2024-10-28 RX ORDER — ACETAMINOPHEN 325 MG/1
650 TABLET ORAL EVERY 4 HOURS PRN
Status: DISCONTINUED | OUTPATIENT
Start: 2024-10-28 | End: 2024-10-28 | Stop reason: HOSPADM

## 2024-10-28 RX ORDER — PROPARACAINE HYDROCHLORIDE 5 MG/ML
1 SOLUTION/ DROPS OPHTHALMIC
Status: DISCONTINUED | OUTPATIENT
Start: 2024-10-28 | End: 2024-10-28 | Stop reason: HOSPADM

## 2024-10-28 RX ORDER — MIDAZOLAM HYDROCHLORIDE 1 MG/ML
1 INJECTION, SOLUTION INTRAMUSCULAR; INTRAVENOUS
Status: DISCONTINUED | OUTPATIENT
Start: 2024-10-28 | End: 2024-10-28 | Stop reason: HOSPADM

## 2024-10-28 RX ORDER — CYCLOP/TROP/PROPA/PHEN/KET/WAT 1-1-0.1%
1 DROPS (EA) OPHTHALMIC (EYE)
Status: COMPLETED | OUTPATIENT
Start: 2024-10-28 | End: 2024-10-28

## 2024-10-28 RX ADMIN — MOXIFLOXACIN OPHTHALMIC 1 DROP: 5 SOLUTION/ DROPS OPHTHALMIC at 07:10

## 2024-10-28 RX ADMIN — MIDAZOLAM HYDROCHLORIDE 2 MG: 1 INJECTION, SOLUTION INTRAMUSCULAR; INTRAVENOUS at 09:10

## 2024-10-28 RX ADMIN — MOXIFLOXACIN 1 DROP: 5 SOLUTION/ DROPS OPHTHALMIC at 09:10

## 2024-10-28 RX ADMIN — Medication 1 DROP: at 07:10

## 2024-10-29 ENCOUNTER — OFFICE VISIT (OUTPATIENT)
Dept: OPHTHALMOLOGY | Facility: CLINIC | Age: 69
End: 2024-10-29
Payer: MEDICARE

## 2024-10-29 DIAGNOSIS — H25.11 NUCLEAR SCLEROSIS OF RIGHT EYE: ICD-10-CM

## 2024-10-29 DIAGNOSIS — Z98.890 POST-OPERATIVE STATE: Primary | ICD-10-CM

## 2024-10-29 PROCEDURE — 99999 PR PBB SHADOW E&M-EST. PATIENT-LVL III: CPT | Mod: PBBFAC,,, | Performed by: OPHTHALMOLOGY

## 2024-11-07 ENCOUNTER — LAB VISIT (OUTPATIENT)
Dept: LAB | Facility: OTHER | Age: 69
End: 2024-11-07
Attending: INTERNAL MEDICINE
Payer: MEDICARE

## 2024-11-07 ENCOUNTER — OFFICE VISIT (OUTPATIENT)
Dept: INTERNAL MEDICINE | Facility: CLINIC | Age: 69
End: 2024-11-07
Attending: INTERNAL MEDICINE
Payer: MEDICARE

## 2024-11-07 VITALS
HEIGHT: 69 IN | SYSTOLIC BLOOD PRESSURE: 114 MMHG | HEART RATE: 52 BPM | OXYGEN SATURATION: 98 % | WEIGHT: 155.44 LBS | BODY MASS INDEX: 23.02 KG/M2 | DIASTOLIC BLOOD PRESSURE: 64 MMHG

## 2024-11-07 DIAGNOSIS — Z12.5 PROSTATE CANCER SCREENING: ICD-10-CM

## 2024-11-07 DIAGNOSIS — R79.9 ABNORMAL FINDING OF BLOOD CHEMISTRY, UNSPECIFIED: ICD-10-CM

## 2024-11-07 DIAGNOSIS — M51.360 DEGENERATION OF INTERVERTEBRAL DISC OF LUMBAR REGION WITH DISCOGENIC BACK PAIN: ICD-10-CM

## 2024-11-07 DIAGNOSIS — Z82.0 FAMILY HISTORY OF ALZHEIMER'S DISEASE: Primary | ICD-10-CM

## 2024-11-07 DIAGNOSIS — I10 PRIMARY HYPERTENSION: ICD-10-CM

## 2024-11-07 DIAGNOSIS — E78.00 HYPERCHOLESTEREMIA: ICD-10-CM

## 2024-11-07 DIAGNOSIS — D64.9 ANEMIA, UNSPECIFIED TYPE: ICD-10-CM

## 2024-11-07 LAB
ALBUMIN SERPL BCP-MCNC: 3.9 G/DL (ref 3.5–5.2)
ALP SERPL-CCNC: 49 U/L (ref 40–150)
ALT SERPL W/O P-5'-P-CCNC: 32 U/L (ref 10–44)
ANION GAP SERPL CALC-SCNC: 7 MMOL/L (ref 8–16)
AST SERPL-CCNC: 39 U/L (ref 10–40)
BASOPHILS # BLD AUTO: 0.03 K/UL (ref 0–0.2)
BASOPHILS NFR BLD: 0.7 % (ref 0–1.9)
BILIRUB SERPL-MCNC: 0.6 MG/DL (ref 0.1–1)
BUN SERPL-MCNC: 20 MG/DL (ref 8–23)
CALCIUM SERPL-MCNC: 9.4 MG/DL (ref 8.7–10.5)
CHLORIDE SERPL-SCNC: 97 MMOL/L (ref 95–110)
CHOLEST SERPL-MCNC: 265 MG/DL (ref 120–199)
CHOLEST/HDLC SERPL: 2.8 {RATIO} (ref 2–5)
CO2 SERPL-SCNC: 27 MMOL/L (ref 23–29)
COMPLEXED PSA SERPL-MCNC: 0.88 NG/ML (ref 0–4)
CREAT SERPL-MCNC: 1 MG/DL (ref 0.5–1.4)
DIFFERENTIAL METHOD BLD: ABNORMAL
EOSINOPHIL # BLD AUTO: 0 K/UL (ref 0–0.5)
EOSINOPHIL NFR BLD: 0.5 % (ref 0–8)
ERYTHROCYTE [DISTWIDTH] IN BLOOD BY AUTOMATED COUNT: 12.3 % (ref 11.5–14.5)
EST. GFR  (NO RACE VARIABLE): >60 ML/MIN/1.73 M^2
ESTIMATED AVG GLUCOSE: 111 MG/DL (ref 68–131)
GLUCOSE SERPL-MCNC: 97 MG/DL (ref 70–110)
HBA1C MFR BLD: 5.5 % (ref 4–5.6)
HCT VFR BLD AUTO: 41.3 % (ref 40–54)
HDLC SERPL-MCNC: 96 MG/DL (ref 40–75)
HDLC SERPL: 36.2 % (ref 20–50)
HGB BLD-MCNC: 13.7 G/DL (ref 14–18)
IMM GRANULOCYTES # BLD AUTO: 0 K/UL (ref 0–0.04)
IMM GRANULOCYTES NFR BLD AUTO: 0 % (ref 0–0.5)
LDLC SERPL CALC-MCNC: 158 MG/DL (ref 63–159)
LYMPHOCYTES # BLD AUTO: 0.8 K/UL (ref 1–4.8)
LYMPHOCYTES NFR BLD: 19.9 % (ref 18–48)
MCH RBC QN AUTO: 31.3 PG (ref 27–31)
MCHC RBC AUTO-ENTMCNC: 33.2 G/DL (ref 32–36)
MCV RBC AUTO: 94 FL (ref 82–98)
MONOCYTES # BLD AUTO: 0.4 K/UL (ref 0.3–1)
MONOCYTES NFR BLD: 9.5 % (ref 4–15)
NEUTROPHILS # BLD AUTO: 2.9 K/UL (ref 1.8–7.7)
NEUTROPHILS NFR BLD: 69.4 % (ref 38–73)
NONHDLC SERPL-MCNC: 169 MG/DL
NRBC BLD-RTO: 0 /100 WBC
PLATELET # BLD AUTO: 221 K/UL (ref 150–450)
PMV BLD AUTO: 9.8 FL (ref 9.2–12.9)
POTASSIUM SERPL-SCNC: 5.4 MMOL/L (ref 3.5–5.1)
PROT SERPL-MCNC: 6.6 G/DL (ref 6–8.4)
RBC # BLD AUTO: 4.38 M/UL (ref 4.6–6.2)
SODIUM SERPL-SCNC: 131 MMOL/L (ref 136–145)
TRIGL SERPL-MCNC: 55 MG/DL (ref 30–150)
TSH SERPL DL<=0.005 MIU/L-ACNC: 1.34 UIU/ML (ref 0.4–4)
WBC # BLD AUTO: 4.12 K/UL (ref 3.9–12.7)

## 2024-11-07 PROCEDURE — 3078F DIAST BP <80 MM HG: CPT | Mod: HCNC,CPTII,S$GLB, | Performed by: INTERNAL MEDICINE

## 2024-11-07 PROCEDURE — 83036 HEMOGLOBIN GLYCOSYLATED A1C: CPT | Mod: HCNC | Performed by: INTERNAL MEDICINE

## 2024-11-07 PROCEDURE — 80053 COMPREHEN METABOLIC PANEL: CPT | Mod: HCNC | Performed by: INTERNAL MEDICINE

## 2024-11-07 PROCEDURE — 84443 ASSAY THYROID STIM HORMONE: CPT | Mod: HCNC | Performed by: INTERNAL MEDICINE

## 2024-11-07 PROCEDURE — 99999 PR PBB SHADOW E&M-EST. PATIENT-LVL III: CPT | Mod: PBBFAC,HCNC,, | Performed by: INTERNAL MEDICINE

## 2024-11-07 PROCEDURE — 84153 ASSAY OF PSA TOTAL: CPT | Mod: HCNC | Performed by: INTERNAL MEDICINE

## 2024-11-07 PROCEDURE — 1159F MED LIST DOCD IN RCRD: CPT | Mod: HCNC,CPTII,S$GLB, | Performed by: INTERNAL MEDICINE

## 2024-11-07 PROCEDURE — 1126F AMNT PAIN NOTED NONE PRSNT: CPT | Mod: HCNC,CPTII,S$GLB, | Performed by: INTERNAL MEDICINE

## 2024-11-07 PROCEDURE — 1101F PT FALLS ASSESS-DOCD LE1/YR: CPT | Mod: HCNC,CPTII,S$GLB, | Performed by: INTERNAL MEDICINE

## 2024-11-07 PROCEDURE — 3288F FALL RISK ASSESSMENT DOCD: CPT | Mod: HCNC,CPTII,S$GLB, | Performed by: INTERNAL MEDICINE

## 2024-11-07 PROCEDURE — 99214 OFFICE O/P EST MOD 30 MIN: CPT | Mod: HCNC,S$GLB,, | Performed by: INTERNAL MEDICINE

## 2024-11-07 PROCEDURE — 80061 LIPID PANEL: CPT | Mod: HCNC | Performed by: INTERNAL MEDICINE

## 2024-11-07 PROCEDURE — 85025 COMPLETE CBC W/AUTO DIFF WBC: CPT | Mod: HCNC | Performed by: INTERNAL MEDICINE

## 2024-11-07 PROCEDURE — G2211 COMPLEX E/M VISIT ADD ON: HCPCS | Mod: HCNC,S$GLB,, | Performed by: INTERNAL MEDICINE

## 2024-11-07 PROCEDURE — 3074F SYST BP LT 130 MM HG: CPT | Mod: HCNC,CPTII,S$GLB, | Performed by: INTERNAL MEDICINE

## 2024-11-07 PROCEDURE — 1160F RVW MEDS BY RX/DR IN RCRD: CPT | Mod: HCNC,CPTII,S$GLB, | Performed by: INTERNAL MEDICINE

## 2024-11-07 PROCEDURE — 36415 COLL VENOUS BLD VENIPUNCTURE: CPT | Mod: HCNC | Performed by: INTERNAL MEDICINE

## 2024-11-07 PROCEDURE — 3008F BODY MASS INDEX DOCD: CPT | Mod: HCNC,CPTII,S$GLB, | Performed by: INTERNAL MEDICINE

## 2024-11-07 RX ORDER — ROSUVASTATIN CALCIUM 5 MG/1
5 TABLET, COATED ORAL DAILY
Qty: 90 TABLET | Refills: 3 | Status: SHIPPED | OUTPATIENT
Start: 2024-11-07 | End: 2025-11-07

## 2024-11-07 NOTE — PROGRESS NOTES
"Subjective:       Patient ID: Hugh Bates is a 69 y.o. male.    Chief Complaint: Annual Exam    Here for annual exam    Knee pain has improved significantly. Was locking up on him.     Detached retina repair followed by cataract.     Recurrent neck pain. Hx of radiculopathy while in high school after being slammed on mat while wrestling.     Mother and sister both passed this past year. Mother had dementia last 5 years of her life. Sister also developed a dementia        History of Present Illness              Review of Systems   Constitutional:  Negative for appetite change, chills, fever and unexpected weight change.   HENT:  Negative for hearing loss, sore throat and trouble swallowing.    Eyes:  Negative for visual disturbance.   Respiratory:  Negative for cough, chest tightness and shortness of breath.    Cardiovascular:  Negative for chest pain and leg swelling.   Gastrointestinal:  Negative for abdominal pain, blood in stool, constipation, diarrhea, nausea and vomiting.   Endocrine: Negative for polydipsia and polyuria.   Genitourinary:  Negative for decreased urine volume, difficulty urinating, dysuria, frequency and urgency.   Musculoskeletal:  Negative for gait problem.   Skin:  Negative for rash.   Neurological:  Negative for dizziness and numbness.   Psychiatric/Behavioral:  The patient is not nervous/anxious.        Objective:      Vitals:    11/07/24 0915   BP: 114/64   BP Location: Left arm   Patient Position: Sitting   Pulse: (!) 52   SpO2: 98%   Weight: 70.5 kg (155 lb 6.8 oz)   Height: 5' 9" (1.753 m)      Physical Exam  Vitals and nursing note reviewed.   Constitutional:       General: He is not in acute distress.     Appearance: Normal appearance. He is well-developed.   HENT:      Head: Normocephalic and atraumatic.      Mouth/Throat:      Pharynx: No oropharyngeal exudate.   Eyes:      General: No scleral icterus.     Conjunctiva/sclera: Conjunctivae normal.      Pupils: Pupils are " equal, round, and reactive to light.   Neck:      Thyroid: No thyromegaly.   Cardiovascular:      Rate and Rhythm: Normal rate and regular rhythm.      Heart sounds: Normal heart sounds. No murmur heard.  Pulmonary:      Effort: Pulmonary effort is normal.      Breath sounds: Normal breath sounds. No wheezing or rales.   Abdominal:      General: There is no distension.   Musculoskeletal:         General: No tenderness.   Lymphadenopathy:      Cervical: No cervical adenopathy.   Skin:     General: Skin is warm and dry.   Neurological:      Mental Status: He is alert and oriented to person, place, and time.   Psychiatric:         Behavior: Behavior normal.         Assessment:       1. Family history of Alzheimer's disease    2. Hypercholesteremia    3. Degeneration of intervertebral disc of lumbar region with discogenic back pain    4. Primary hypertension    5. Anemia, unspecified type    6. Prostate cancer screening    7. Abnormal finding of blood chemistry, unspecified        Plan:       Hugh was seen today for annual exam.    Diagnoses and all orders for this visit:    Family history of Alzheimer's disease    Hypercholesteremia  -     Lipid Panel; Future   START -     rosuvastatin (CRESTOR) 5 MG tablet; Take 1 tablet (5 mg total) by mouth once daily.    Degeneration of intervertebral disc of lumbar region with discogenic back pain   No red flags on chronic Hx or acute Hx. No s/s on exam to suggest emergent evaluation needed. Common pathologies of recurrent MSK discussed. Common triggers discussed. Common OTC and Rx modalities discussed. Discussed and/or given HEP.     Primary hypertension  -     Comprehensive Metabolic Panel; Future  -     Lipid Panel; Future  -     TSH; Future  -     CBC Auto Differential; Future  -     Hemoglobin A1C; Future    Anemia, unspecified type  -     CBC Auto Differential; Future    Prostate cancer screening  -     PSA, Screening; Future    Abnormal finding of blood chemistry,  unspecified  -     Hemoglobin A1C; Future    Other orders             Assessment & Plan            Visit today is associated with current or anticipated ongoing medical care related to this patient's single serious condition/complex condition of HTN. The patient will return to see me as these issues will be followed longitudinally.    This note was generated with the assistance of ambient listening technology. Verbal consent was obtained by the patient and accompanying visitor(s) for the recording of patient appointment to facilitate this note. I attest to having reviewed and edited the generated note for accuracy, though some syntax or spelling errors may persist. Please contact the author of this note for any clarification.      Joseph Strong MD  Internal Medicine-[facility]        Side effects of medication(s) were discussed in detail and patient voiced understanding.  Patient will call back for any issues or complications.

## 2024-11-08 NOTE — PROGRESS NOTES
I have reviewed your recent lab work.  Your labs look good, except:    1) Your potassium levels are elevated. Please avoid high potassium containing foods such as bananas, avocados, white beans, spinach, baked potatoes, and yogurt. For the time being avoid NSAIDS such as Advil, ibuprofen, motrin, goody's, aspirin.We will need to monitor blood pressure during this. If blood pressure is elevated and develops SOB, CP, dizziness then please call clinic or go to ED.      We will repeat this with your lipids in a couple weeks.    Rest of labs look fine.    Respectfully,  Joseph Strong        Your kidney function is normal.  Your liver studies are normal.  You do not have diabetes.  Your thyroid studies are normal.  Your cholesterol levels look good.    If you have any questions or concerns about particular lab results please notify me via [facility] messaging or by calling our office at [phone].    Respectfully,  Joseph Strong

## 2024-12-11 ENCOUNTER — TELEPHONE (OUTPATIENT)
Dept: OPTOMETRY | Facility: CLINIC | Age: 69
End: 2024-12-11
Payer: MEDICARE

## 2024-12-12 ENCOUNTER — OFFICE VISIT (OUTPATIENT)
Dept: OPTOMETRY | Facility: CLINIC | Age: 69
End: 2024-12-12
Payer: MEDICARE

## 2024-12-12 DIAGNOSIS — H26.492 CLOUDY POSTERIOR CAPSULE, LEFT: Primary | ICD-10-CM

## 2024-12-12 PROCEDURE — 1159F MED LIST DOCD IN RCRD: CPT | Mod: HCNC,CPTII,S$GLB, | Performed by: OPTOMETRIST

## 2024-12-12 PROCEDURE — 99024 POSTOP FOLLOW-UP VISIT: CPT | Mod: HCNC,S$GLB,, | Performed by: OPTOMETRIST

## 2024-12-12 PROCEDURE — 1101F PT FALLS ASSESS-DOCD LE1/YR: CPT | Mod: HCNC,CPTII,S$GLB, | Performed by: OPTOMETRIST

## 2024-12-12 PROCEDURE — 99999 PR PBB SHADOW E&M-EST. PATIENT-LVL III: CPT | Mod: PBBFAC,HCNC,, | Performed by: OPTOMETRIST

## 2024-12-12 PROCEDURE — 1160F RVW MEDS BY RX/DR IN RCRD: CPT | Mod: HCNC,CPTII,S$GLB, | Performed by: OPTOMETRIST

## 2024-12-12 PROCEDURE — 3288F FALL RISK ASSESSMENT DOCD: CPT | Mod: HCNC,CPTII,S$GLB, | Performed by: OPTOMETRIST

## 2024-12-12 PROCEDURE — 1126F AMNT PAIN NOTED NONE PRSNT: CPT | Mod: HCNC,CPTII,S$GLB, | Performed by: OPTOMETRIST

## 2024-12-12 PROCEDURE — 3044F HG A1C LEVEL LT 7.0%: CPT | Mod: HCNC,CPTII,S$GLB, | Performed by: OPTOMETRIST

## 2024-12-12 NOTE — PROGRESS NOTES
HPI    68 Y/o male is here for 1 month post op with C/o pt is here today is   stating the vision in OS is not as clear as OD. Pt wears otc readers +2.50  Pt denies pain and discomfort  No f/f    Eye med: Refresh OU TID   Last edited by Elena Guerrero MA on 12/12/2024  8:25 AM.            Assessment /Plan     For exam results, see Encounter Report.    Cloudy posterior capsule, left      Cloudy capsule OS sp pciol OU  Hx retina repair for RRD OS--see retina notes.  Even w best correction OS will not see as well as OD, but discussed w pt YAG should help VA OS    PLAN:    Consult--Dr Cisneros--YAG OS

## 2025-02-05 ENCOUNTER — PROCEDURE VISIT (OUTPATIENT)
Dept: OPHTHALMOLOGY | Facility: CLINIC | Age: 70
End: 2025-02-05
Payer: MEDICARE

## 2025-02-05 DIAGNOSIS — H26.492 PCO (POSTERIOR CAPSULAR OPACIFICATION), LEFT: Primary | ICD-10-CM

## 2025-02-05 DIAGNOSIS — H33.022 RETINAL DETACHMENT OF LEFT EYE WITH MULTIPLE BREAKS: ICD-10-CM

## 2025-02-05 DIAGNOSIS — Z96.1 PSEUDOPHAKIA: ICD-10-CM

## 2025-02-05 PROCEDURE — 66821 AFTER CATARACT LASER SURGERY: CPT | Mod: LT,S$GLB,, | Performed by: OPHTHALMOLOGY

## 2025-02-05 PROCEDURE — 92014 COMPRE OPH EXAM EST PT 1/>: CPT | Mod: 57,S$GLB,, | Performed by: OPHTHALMOLOGY

## 2025-02-05 NOTE — PROGRESS NOTES
Subjective:       Patient ID: Hugh Bates is a 69 y.o. male.    Chief Complaint: Laser Treatment (Patient Hugh Bates is a 69 year old male.)    HPI     Laser Treatment     Additional comments: Patient Hugh Bates is a 69 year old male.           Comments    Pt referred by Dr. Fuller for YAG laser    DLS: 12/12/2024 with Dr. Fuller    Meds: Refresh TID OU    POHx:  1. Cloudy posterior capsule, left  2. RRD OS      Macula involving with large ST tear      S/p 25g PPV/AFx/EL/C3F8 OS 2/7/24      Recurrent inferior RRD with small tuft of PVR and associated break at   6:00 5/14/24      S/p Laser retinopexy  OS 5/14/24  3. PCIOL OU          Last edited by Khloe Correa on 2/5/2025  3:10 PM.             Assessment:       1. PCO (posterior capsular opacification), left    2. 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 Pseudophakia        Plan:       Visually significant  PCO OS- Pt. Wants Laser.  Hx/o RRD OS s/p repair-Doing well.      YAG CAP left eye (OS) today. TE= 86 mj, Avg. 2.0 mj, 43 pulses.  PF taper OS.  RTC 2-3 wks.    YAG laser Capsulotomy Procedure Note:  Informed consent was obtained and correct eye was verified with patient.   One drop of topical Proparacaine 1% was instilled.  YAG laser applied to posterior capsule in cruciate pattern.  One drop of Apraclonidine 0.5% and 1 drop of Pred Acetate 1% applied to eye after laser.  Pt tolerated procedure well. No complications.  Follow up in 2-3 weeks.

## 2025-02-17 ENCOUNTER — NURSE TRIAGE (OUTPATIENT)
Dept: ADMINISTRATIVE | Facility: CLINIC | Age: 70
End: 2025-02-17
Payer: MEDICARE

## 2025-02-17 ENCOUNTER — HOSPITAL ENCOUNTER (EMERGENCY)
Facility: HOSPITAL | Age: 70
Discharge: HOME | End: 2025-02-17
Attending: EMERGENCY MEDICINE
Payer: MEDICARE

## 2025-02-17 VITALS
HEART RATE: 64 BPM | DIASTOLIC BLOOD PRESSURE: 73 MMHG | TEMPERATURE: 100 F | OXYGEN SATURATION: 98 % | HEIGHT: 70 IN | SYSTOLIC BLOOD PRESSURE: 152 MMHG | BODY MASS INDEX: 21.9 KG/M2 | RESPIRATION RATE: 18 BRPM | WEIGHT: 153 LBS

## 2025-02-17 DIAGNOSIS — R07.89 RIGHT-SIDED CHEST WALL PAIN: ICD-10-CM

## 2025-02-17 LAB
ALBUMIN SERPL BCP-MCNC: 3.6 G/DL (ref 3.5–5.2)
ALP SERPL-CCNC: 60 U/L (ref 40–150)
ALT SERPL W/O P-5'-P-CCNC: 50 U/L (ref 10–44)
ANION GAP SERPL CALC-SCNC: 8 MMOL/L (ref 8–16)
AST SERPL-CCNC: 43 U/L (ref 10–40)
BASOPHILS # BLD AUTO: 0.01 K/UL (ref 0–0.2)
BASOPHILS NFR BLD: 0.1 % (ref 0–1.9)
BILIRUB SERPL-MCNC: 0.6 MG/DL (ref 0.1–1)
BILIRUB UR QL STRIP: NEGATIVE
BUN SERPL-MCNC: 16 MG/DL (ref 8–23)
CALCIUM SERPL-MCNC: 8.7 MG/DL (ref 8.7–10.5)
CHLORIDE SERPL-SCNC: 101 MMOL/L (ref 95–110)
CLARITY UR: CLEAR
CO2 SERPL-SCNC: 25 MMOL/L (ref 23–29)
COLOR UR: YELLOW
CREAT SERPL-MCNC: 0.9 MG/DL (ref 0.5–1.4)
DIFFERENTIAL METHOD BLD: ABNORMAL
EOSINOPHIL # BLD AUTO: 0 K/UL (ref 0–0.5)
EOSINOPHIL NFR BLD: 0 % (ref 0–8)
ERYTHROCYTE [DISTWIDTH] IN BLOOD BY AUTOMATED COUNT: 12.5 % (ref 11.5–14.5)
EST. GFR  (NO RACE VARIABLE): >60 ML/MIN/1.73 M^2
GLUCOSE SERPL-MCNC: 130 MG/DL (ref 70–110)
GLUCOSE UR QL STRIP: NEGATIVE
HCT VFR BLD AUTO: 39.3 % (ref 40–54)
HGB BLD-MCNC: 13.4 G/DL (ref 14–18)
HGB UR QL STRIP: ABNORMAL
IMM GRANULOCYTES # BLD AUTO: 0.03 K/UL (ref 0–0.04)
IMM GRANULOCYTES NFR BLD AUTO: 0.4 % (ref 0–0.5)
INFLUENZA A, MOLECULAR: NEGATIVE
INFLUENZA B, MOLECULAR: NEGATIVE
KETONES UR QL STRIP: ABNORMAL
LEUKOCYTE ESTERASE UR QL STRIP: NEGATIVE
LYMPHOCYTES # BLD AUTO: 0.2 K/UL (ref 1–4.8)
LYMPHOCYTES NFR BLD: 2.3 % (ref 18–48)
MCH RBC QN AUTO: 31.6 PG (ref 27–31)
MCHC RBC AUTO-ENTMCNC: 34.1 G/DL (ref 32–36)
MCV RBC AUTO: 93 FL (ref 82–98)
MONOCYTES # BLD AUTO: 0.3 K/UL (ref 0.3–1)
MONOCYTES NFR BLD: 3.5 % (ref 4–15)
NEUTROPHILS # BLD AUTO: 6.9 K/UL (ref 1.8–7.7)
NEUTROPHILS NFR BLD: 93.7 % (ref 38–73)
NITRITE UR QL STRIP: NEGATIVE
NRBC BLD-RTO: 0 /100 WBC
PH UR STRIP: 7 [PH] (ref 5–8)
PLATELET # BLD AUTO: 187 K/UL (ref 150–450)
PMV BLD AUTO: 9.9 FL (ref 9.2–12.9)
POTASSIUM SERPL-SCNC: 4.3 MMOL/L (ref 3.5–5.1)
PROT SERPL-MCNC: 6.3 G/DL (ref 6–8.4)
PROT UR QL STRIP: NEGATIVE
RBC # BLD AUTO: 4.24 M/UL (ref 4.6–6.2)
SARS-COV-2 RDRP RESP QL NAA+PROBE: NEGATIVE
SODIUM SERPL-SCNC: 134 MMOL/L (ref 136–145)
SP GR UR STRIP: 1.02 (ref 1–1.03)
SPECIMEN SOURCE: NORMAL
URN SPEC COLLECT METH UR: ABNORMAL
UROBILINOGEN UR STRIP-ACNC: 1 EU/DL
WBC # BLD AUTO: 7.39 K/UL (ref 3.9–12.7)

## 2025-02-17 PROCEDURE — 71046 X-RAY EXAM CHEST 2 VIEWS: CPT | Mod: TC

## 2025-02-17 PROCEDURE — 85025 COMPLETE CBC W/AUTO DIFF WBC: CPT | Performed by: EMERGENCY MEDICINE

## 2025-02-17 PROCEDURE — 80053 COMPREHEN METABOLIC PANEL: CPT | Performed by: EMERGENCY MEDICINE

## 2025-02-17 PROCEDURE — 36415 COLL VENOUS BLD VENIPUNCTURE: CPT | Performed by: EMERGENCY MEDICINE

## 2025-02-17 PROCEDURE — 87502 INFLUENZA DNA AMP PROBE: CPT | Performed by: EMERGENCY MEDICINE

## 2025-02-17 PROCEDURE — 99284 EMERGENCY DEPT VISIT MOD MDM: CPT | Mod: 25,HCWC

## 2025-02-17 PROCEDURE — 81003 URINALYSIS AUTO W/O SCOPE: CPT | Performed by: EMERGENCY MEDICINE

## 2025-02-17 PROCEDURE — 87635 SARS-COV-2 COVID-19 AMP PRB: CPT | Performed by: EMERGENCY MEDICINE

## 2025-02-17 RX ORDER — CEPHALEXIN 250 MG/1
250 CAPSULE ORAL EVERY 12 HOURS
Qty: 14 CAPSULE | Refills: 0 | Status: ON HOLD | OUTPATIENT
Start: 2025-02-17 | End: 2025-02-22 | Stop reason: HOSPADM

## 2025-02-17 NOTE — DISCHARGE INSTRUCTIONS
Your lab work and chest x-ray were normal.  No evidence for pneumonia, no evidence for skeletal injury etc..  Your pain may be secondary to strained muscles, but could also be secondary to inflammation of the lymph nodes in your arm pit as a result of the abrasion on your knuckle.  Take Keflex for this.  Use generic triple antibiotic ointment on the abrasion twice daily and keep it covered with a loose bandage.  Return here for any worsening signs or symptoms, otherwise follow-up with your primary care doctor.

## 2025-02-17 NOTE — TELEPHONE ENCOUNTER
Pt's wife reports pt was seen in the ED today for chest pain and did discuss a cut/scrape pt obtained while cleaning out a garage, but wife is now concerned if pt should get a tetanus shot as his last one was 11/2015. Wife denied needing to have the cut/scrape triaged as the ED doctor did already look at it, just more the question of when pt should get a tetanus booster. Wife/Pt advised for dirty cuts/scrapes it advised a booster if not shot given in the last 5 years per cut/laceration protocol information, Wife plans to call the [facility] or [facility] near them to see if pt can have a booster done there tonight as protocol info recommended a booster within 72 hours of the injury. Wife/Pt encouraged to call back with any worsening symptoms or questions. She verbalized understanding.    Reason for Disposition   Health information question, no triage required and triager able to answer question    Protocols used: Information Only Call - No Triage-A-

## 2025-02-17 NOTE — ED PROVIDER NOTES
Encounter Date: 2/17/2025       History     Chief Complaint   Patient presents with    Muscle Pain     Pt stated he has had muscle pain to his right chest wall and arm pit. Pt stated the pain started yesterday morning. Pt did take motrin this morning at 0630. Pt has also had chills that started this morning. Pain increases with palpation.      69-year-old male here from home via private vehicle complaining of pain in the right axilla.  He 1st noticed the symptoms yesterday evening and states that pain kept him awake last night.  He does not remember any overexertion or straining.  He does lift which occasionally, and plays pickleball, but does not remember anything unusual.  Denies any cough or shortness of breath.  He does have a slight low-grade fever here this morning, at 99.5°.  Denies sore throat, congestion, cough, wheezing, etc..  The area in the right axilla is tender to palpation.  He has not noticed any abscess, enlarged lymph nodes, etc..  He tried over-the-counter Tylenol without relief.        Review of patient's allergies indicates:  No Known Allergies  Past Medical History:   Diagnosis Date    Basal cell carcinoma     Urinary tract infection      Past Surgical History:   Procedure Laterality Date    CATARACT EXTRACTION W/  INTRAOCULAR LENS IMPLANT Left 10/7/2024    Procedure: EXTRACTION, CATARACT, WITH IOL INSERTION;  Surgeon: Sepideh Cisneros MD;  Location: [facility] OR;  Service: Ophthalmology;  Laterality: Left;  ORA, MC    CATARACT EXTRACTION W/  INTRAOCULAR LENS IMPLANT Right 10/28/2024    Procedure: EXTRACTION, CATARACT, WITH IOL INSERTION;  Surgeon: Sepideh Cisneros MD;  Location: [facility] OR;  Service: Ophthalmology;  Laterality: Right;  JANINA     COLONOSCOPY N/A 9/30/2016    Procedure: COLONOSCOPY;  Surgeon: George Virgen MD;  Location: [facility] ENDO (4TH FLR);  Service: Colon and Rectal;  Laterality: N/A;    COLONOSCOPY      FRACTURE SURGERY  1974    broken thumb    REPAIR OF RETINAL DETACHMENT WITH  VITRECTOMY Left 2024    Procedure: REPAIR, RETINAL DETACHMENT, WITH VITRECTOMY;  Surgeon: JOHN Ortiz MD;  Location: [facility] OR [address] [place] [address];  Service: Ophthalmology;  Laterality: Left;     Family History   Problem Relation Name Age of Onset    Colon polyps Mother Jimmy Bates     Squamous cell carcinoma Mother Jimmy Batse     Hearing loss Mother Jimmy Bates         Bad hearing, but will not get hearing aid    Cataracts Mother Jimmy Bates     Cancer Father Cristian Bates Jr.          in     Early death Father Cristian Bates Jr.         was 44 in , [place]    No Known Problems Sister      No Known Problems Sister      No Known Problems Sister      No Known Problems Brother      No Known Problems Maternal Aunt      No Known Problems Maternal Uncle      No Known Problems Paternal Aunt      No Known Problems Paternal Uncle      Arthritis Maternal Grandmother Michaela iWseman     No Known Problems Maternal Grandfather      No Known Problems Paternal Grandmother      No Known Problems Paternal Grandfather      No Known Problems Other      Diabetes Neg Hx      Melanoma Neg Hx       Social History[1]  Review of Systems   Constitutional:  Positive for fever (Low-grade). Negative for chills.   HENT:  Negative for congestion, rhinorrhea and sore throat.    Respiratory:  Negative for cough, shortness of breath and wheezing.    Cardiovascular:  Negative for chest pain and palpitations.   Gastrointestinal:  Negative for abdominal pain, nausea and vomiting.   Genitourinary:  Negative for dysuria, flank pain, frequency and hematuria.   Musculoskeletal:  Positive for myalgias (Pain in right axilla). Negative for arthralgias, back pain, neck pain and neck stiffness.   Skin:  Negative for pallor and rash.   Neurological:  Negative for dizziness, weakness, numbness and headaches.   All other systems reviewed and are negative.      Physical Exam     Initial Vitals [25 0703]   BP Pulse  Resp Temp SpO2   (!) 152/73 64 18 99.5 °F (37.5 °C) 98 %      MAP       --         Physical Exam    Nursing note and vitals reviewed.  Constitutional: He appears well-developed and well-nourished. He is not diaphoretic. No distress.   HENT:   Head: Normocephalic and atraumatic.   Nose: Nose normal. Mouth/Throat: Oropharynx is clear and moist. No oropharyngeal exudate.   Eyes: Conjunctivae and EOM are normal. Pupils are equal, round, and reactive to light. No scleral icterus.   Neck: Neck supple. No JVD present.   Normal range of motion.  Cardiovascular:  Normal rate, regular rhythm, normal heart sounds and intact distal pulses.           No murmur heard.  Pulmonary/Chest: Breath sounds normal. No stridor. No respiratory distress. He has no wheezes. He has no rhonchi. He has no rales.   Abdominal: Abdomen is soft. Bowel sounds are normal. He exhibits no distension. There is no abdominal tenderness.   Musculoskeletal:         General: Tenderness present. No edema. Normal range of motion.      Cervical back: Normal range of motion and neck supple.      Comments: There is some tenderness to palpation in the patient's anterior right axilla.  Pain appears to be deep in the axilla, not associated with the pectoralis muscle.  No enlarged lymph nodes or other masses were noted.  No palpable bony abnormality.  No bruising, swelling, etc.     Neurological: He is alert and oriented to person, place, and time. He has normal strength. No cranial nerve deficit or sensory deficit. GCS score is 15. GCS eye subscore is 4. GCS verbal subscore is 5. GCS motor subscore is 6.   Skin: Skin is warm and dry. Capillary refill takes less than 2 seconds. No erythema.   There is a slight abrasion on the dorsum of the right hand, over the MIP joint of the index finger.  It is slightly erythematous, but not fluctuant, with no obvious cellulitic component.   Psychiatric: He has a normal mood and affect. His behavior is normal.         ED Course    Procedures  Labs Reviewed   CBC W/ AUTO DIFFERENTIAL - Abnormal       Result Value    WBC 7.39      RBC 4.24 (*)     Hemoglobin 13.4 (*)     Hematocrit 39.3 (*)     MCV 93      MCH 31.6 (*)     MCHC 34.1      RDW 12.5      Platelets 187      MPV 9.9      Immature Granulocytes 0.4      Gran # (ANC) 6.9      Immature Grans (Abs) 0.03      Lymph # 0.2 (*)     Mono # 0.3      Eos # 0.0      Baso # 0.01      nRBC 0      Gran % 93.7 (*)     Lymph % 2.3 (*)     Mono % 3.5 (*)     Eosinophil % 0.0      Basophil % 0.1      Differential Method Automated     COMPREHENSIVE METABOLIC PANEL - Abnormal    Sodium 134 (*)     Potassium 4.3      Chloride 101      CO2 25      Glucose 130 (*)     BUN 16      Creatinine 0.9      Calcium 8.7      Total Protein 6.3      Albumin 3.6      Total Bilirubin 0.6      Alkaline Phosphatase 60      AST 43 (*)     ALT 50 (*)     eGFR >60.0      Anion Gap 8     URINALYSIS, REFLEX TO URINE CULTURE - Abnormal    Specimen UA Urine, Unspecified      Color, UA Yellow      Appearance, UA Clear      pH, UA 7.0      Specific Gravity, UA 1.020      Protein, UA Negative      Glucose, UA Negative      Ketones, UA Trace (*)     Bilirubin (UA) Negative      Occult Blood UA Trace (*)     Nitrite, UA Negative      Urobilinogen, UA 1.0      Leukocytes, UA Negative      Narrative:     Preferred Collection Type->Urine, Clean Catch  Specimen Source->Urine   INFLUENZA A & B BY MOLECULAR    Influenza A, Molecular Negative      Influenza B, Molecular Negative      Flu A & B Source Nasal Swab     SARS-COV-2 RNA AMPLIFICATION, QUAL    SARS-CoV-2 RNA, Amplification, Qual Negative            Imaging Results              X-Ray Chest PA And Lateral (Final result)  Result time 02/17/25 09:11:53      Final result by Julian Rain MD (02/17/25 09:11:53)                   Impression:      No acute chest disease.      Electronically signed by: Julian Rain  Date:    02/17/2025  Time:    09:11               Narrative:     EXAMINATION:  XR CHEST PA AND LATERAL    CLINICAL HISTORY:  Other chest pain    TECHNIQUE:  PA and lateral views of the chest were performed.    COMPARISON:  02/16/2017.    FINDINGS:  Mild pulmonary hyperinflation.The lungs are clear.  No focal consolidation.  Heart size is normal.  Mediastinal contours unremarkable.    Bony thorax intact.                                       Medications - No data to display  Medical Decision Making  Differential includes muscle strain, pneumonia, pneumothorax, nerve impingement, infectious process, etc.    Patient's lab work was normal with a normal white count, chest x-ray was clear, no evidence of pneumonia or pneumothorax.  No rib fractures etc..  Patient does have a slight abrasion on the dorsum of the right hand, and it may be that he has some mild lymphadenopathy in the right axilla as result.  Will treat with Keflex.  Patient will follow-up with his PCP in [place] when he gets home.  He will return here as needed or if worse in any way.    Amount and/or Complexity of Data Reviewed  Labs: ordered.  Radiology: ordered.    Risk  Prescription drug management.                                      Clinical Impression:  Final diagnoses:  [R07.89] Right-sided chest wall pain          ED Disposition Condition    Discharge Stable          ED Prescriptions       Medication Sig Dispense Start Date End Date Auth. Provider    cephALEXin (KEFLEX) 250 MG capsule Take 1 capsule (250 mg total) by mouth every 12 (twelve) hours. for 7 days 14 capsule 2/17/2025 2/24/2025 Varinder Davis MD          Follow-up Information       Follow up With Specialties Details Why Contact Info    Joseph Santana MD Internal Medicine Schedule an appointment as soon as possible for a visit   [address] [facility]  [address]  [facility] [address]  [phone]      [facility] Emergency Dept Emergency Medicine  If symptoms worsen [address] [facility] [address] [phone]                  [1]   Social History  Tobacco Use    Smoking status: Never    Smokeless tobacco: Never   Substance Use Topics    Alcohol use: Yes     Alcohol/week: 7.0 standard drinks of alcohol     Types: 1 Glasses of wine, 6 Cans of beer per week    Drug use: Never        Varinder Davis MD  02/17/25 0938

## 2025-02-19 ENCOUNTER — OFFICE VISIT (OUTPATIENT)
Dept: INTERNAL MEDICINE | Facility: CLINIC | Age: 70
End: 2025-02-19
Payer: MEDICARE

## 2025-02-19 ENCOUNTER — HOSPITAL ENCOUNTER (INPATIENT)
Facility: OTHER | Age: 70
LOS: 3 days | Discharge: HOME | DRG: 872 | End: 2025-02-23
Admitting: HOSPITALIST
Payer: MEDICARE

## 2025-02-19 ENCOUNTER — OCHSNER VIRTUAL EMERGENCY DEPARTMENT (OUTPATIENT)
Facility: CLINIC | Age: 70
End: 2025-02-19
Payer: MEDICARE

## 2025-02-19 VITALS
OXYGEN SATURATION: 95 % | BODY MASS INDEX: 22.66 KG/M2 | DIASTOLIC BLOOD PRESSURE: 60 MMHG | WEIGHT: 158.31 LBS | SYSTOLIC BLOOD PRESSURE: 98 MMHG | HEIGHT: 70 IN | HEART RATE: 61 BPM

## 2025-02-19 DIAGNOSIS — L03.90 CELLULITIS, UNSPECIFIED CELLULITIS SITE: Primary | ICD-10-CM

## 2025-02-19 DIAGNOSIS — Z13.6 SCREENING FOR CARDIOVASCULAR CONDITION: ICD-10-CM

## 2025-02-19 DIAGNOSIS — A41.9 SEPSIS: ICD-10-CM

## 2025-02-19 DIAGNOSIS — L03.311 CELLULITIS OF ABDOMINAL WALL: ICD-10-CM

## 2025-02-19 DIAGNOSIS — L03.313 CELLULITIS OF CHEST WALL: Primary | ICD-10-CM

## 2025-02-19 PROBLEM — E78.5 DYSLIPIDEMIA: Status: ACTIVE | Noted: 2025-02-19

## 2025-02-19 LAB
ALBUMIN SERPL BCP-MCNC: 3 G/DL (ref 3.5–5.2)
ALP SERPL-CCNC: 70 U/L (ref 40–150)
ALT SERPL W/O P-5'-P-CCNC: 45 U/L (ref 10–44)
ANION GAP SERPL CALC-SCNC: 10 MMOL/L (ref 8–16)
ANION GAP SERPL CALC-SCNC: 7 MMOL/L (ref 8–16)
AST SERPL-CCNC: 37 U/L (ref 10–40)
BASOPHILS # BLD AUTO: 0.05 K/UL (ref 0–0.2)
BASOPHILS NFR BLD: 0.6 % (ref 0–1.9)
BILIRUB SERPL-MCNC: 1 MG/DL (ref 0.1–1)
BILIRUB UR QL STRIP: NEGATIVE
BUN SERPL-MCNC: 26 MG/DL (ref 8–23)
BUN SERPL-MCNC: 31 MG/DL (ref 8–23)
CALCIUM SERPL-MCNC: 7.8 MG/DL (ref 8.7–10.5)
CALCIUM SERPL-MCNC: 8.8 MG/DL (ref 8.7–10.5)
CHLORIDE SERPL-SCNC: 90 MMOL/L (ref 95–110)
CHLORIDE SERPL-SCNC: 97 MMOL/L (ref 95–110)
CLARITY UR: CLEAR
CO2 SERPL-SCNC: 23 MMOL/L (ref 23–29)
CO2 SERPL-SCNC: 24 MMOL/L (ref 23–29)
COLOR UR: YELLOW
CREAT SERPL-MCNC: 0.9 MG/DL (ref 0.5–1.4)
CREAT SERPL-MCNC: 1.2 MG/DL (ref 0.5–1.4)
CREAT UR-MCNC: 36.9 MG/DL (ref 23–375)
DIFFERENTIAL METHOD BLD: ABNORMAL
EOSINOPHIL # BLD AUTO: 0.1 K/UL (ref 0–0.5)
EOSINOPHIL NFR BLD: 1.1 % (ref 0–8)
ERYTHROCYTE [DISTWIDTH] IN BLOOD BY AUTOMATED COUNT: 12.5 % (ref 11.5–14.5)
EST. GFR  (NO RACE VARIABLE): >60 ML/MIN/1.73 M^2
EST. GFR  (NO RACE VARIABLE): >60 ML/MIN/1.73 M^2
GLUCOSE SERPL-MCNC: 94 MG/DL (ref 70–110)
GLUCOSE SERPL-MCNC: 99 MG/DL (ref 70–110)
GLUCOSE UR QL STRIP: NEGATIVE
HCT VFR BLD AUTO: 37.5 % (ref 40–54)
HCV AB SERPL QL IA: NEGATIVE
HGB BLD-MCNC: 13.1 G/DL (ref 14–18)
HGB UR QL STRIP: ABNORMAL
HIV 1+2 AB+HIV1 P24 AG SERPL QL IA: NEGATIVE
IMM GRANULOCYTES # BLD AUTO: 0.04 K/UL (ref 0–0.04)
IMM GRANULOCYTES NFR BLD AUTO: 0.5 % (ref 0–0.5)
KETONES UR QL STRIP: NEGATIVE
LACTATE SERPL-SCNC: 1 MMOL/L (ref 0.5–2.2)
LDH SERPL L TO P-CCNC: 1.24 MMOL/L (ref 0.5–2.2)
LEUKOCYTE ESTERASE UR QL STRIP: NEGATIVE
LYMPHOCYTES # BLD AUTO: 0.1 K/UL (ref 1–4.8)
LYMPHOCYTES NFR BLD: 1.6 % (ref 18–48)
MCH RBC QN AUTO: 32.3 PG (ref 27–31)
MCHC RBC AUTO-ENTMCNC: 34.9 G/DL (ref 32–36)
MCV RBC AUTO: 92 FL (ref 82–98)
MONOCYTES # BLD AUTO: 0.3 K/UL (ref 0.3–1)
MONOCYTES NFR BLD: 3.5 % (ref 4–15)
NEUTROPHILS # BLD AUTO: 7.3 K/UL (ref 1.8–7.7)
NEUTROPHILS NFR BLD: 92.7 % (ref 38–73)
NITRITE UR QL STRIP: NEGATIVE
NRBC BLD-RTO: 0 /100 WBC
OHS QRS DURATION: 94 MS
OHS QTC CALCULATION: 394 MS
OSMOLALITY SERPL: 269 MOSM/KG (ref 280–300)
OSMOLALITY UR: 223 MOSM/KG (ref 50–1200)
PH UR STRIP: 6 [PH] (ref 5–8)
PLATELET # BLD AUTO: 141 K/UL (ref 150–450)
PLATELET BLD QL SMEAR: ABNORMAL
PMV BLD AUTO: 10.5 FL (ref 9.2–12.9)
POTASSIUM SERPL-SCNC: 3.9 MMOL/L (ref 3.5–5.1)
POTASSIUM SERPL-SCNC: 4.3 MMOL/L (ref 3.5–5.1)
PROT SERPL-MCNC: 6.4 G/DL (ref 6–8.4)
PROT UR QL STRIP: ABNORMAL
RBC # BLD AUTO: 4.06 M/UL (ref 4.6–6.2)
SAMPLE: NORMAL
SODIUM SERPL-SCNC: 124 MMOL/L (ref 136–145)
SODIUM SERPL-SCNC: 127 MMOL/L (ref 136–145)
SODIUM UR-SCNC: <20 MMOL/L (ref 20–250)
SP GR UR STRIP: 1.01 (ref 1–1.03)
TSH SERPL DL<=0.005 MIU/L-ACNC: 1.51 UIU/ML (ref 0.4–4)
URN SPEC COLLECT METH UR: ABNORMAL
UROBILINOGEN UR STRIP-ACNC: NEGATIVE EU/DL
WBC # BLD AUTO: 7.9 K/UL (ref 3.9–12.7)

## 2025-02-19 PROCEDURE — 86803 HEPATITIS C AB TEST: CPT | Mod: HCNC

## 2025-02-19 PROCEDURE — 96366 THER/PROPH/DIAG IV INF ADDON: CPT

## 2025-02-19 PROCEDURE — 80053 COMPREHEN METABOLIC PANEL: CPT | Mod: HCNC

## 2025-02-19 PROCEDURE — 63600175 PHARM REV CODE 636 W HCPCS

## 2025-02-19 PROCEDURE — 96376 TX/PRO/DX INJ SAME DRUG ADON: CPT

## 2025-02-19 PROCEDURE — 63600175 PHARM REV CODE 636 W HCPCS: Performed by: HOSPITALIST

## 2025-02-19 PROCEDURE — 93005 ELECTROCARDIOGRAM TRACING: CPT

## 2025-02-19 PROCEDURE — 96366 THER/PROPH/DIAG IV INF ADDON: CPT | Mod: HCNC

## 2025-02-19 PROCEDURE — 96367 TX/PROPH/DG ADDL SEQ IV INF: CPT | Mod: HCNC

## 2025-02-19 PROCEDURE — 96375 TX/PRO/DX INJ NEW DRUG ADDON: CPT | Mod: HCNC

## 2025-02-19 PROCEDURE — 84300 ASSAY OF URINE SODIUM: CPT | Mod: HCNC | Performed by: HOSPITALIST

## 2025-02-19 PROCEDURE — 96361 HYDRATE IV INFUSION ADD-ON: CPT

## 2025-02-19 PROCEDURE — G0378 HOSPITAL OBSERVATION PER HR: HCPCS | Mod: HCNC

## 2025-02-19 PROCEDURE — 87389 HIV-1 AG W/HIV-1&-2 AB AG IA: CPT | Mod: HCNC

## 2025-02-19 PROCEDURE — 63600175 PHARM REV CODE 636 W HCPCS: Mod: HCNC | Performed by: HOSPITALIST

## 2025-02-19 PROCEDURE — 84443 ASSAY THYROID STIM HORMONE: CPT | Mod: HCNC | Performed by: HOSPITALIST

## 2025-02-19 PROCEDURE — 80048 BASIC METABOLIC PNL TOTAL CA: CPT | Mod: HCNC,XB | Performed by: HOSPITALIST

## 2025-02-19 PROCEDURE — 83605 ASSAY OF LACTIC ACID: CPT | Mod: HCNC | Performed by: HOSPITALIST

## 2025-02-19 PROCEDURE — 87040 BLOOD CULTURE FOR BACTERIA: CPT | Mod: 59,HCNC

## 2025-02-19 PROCEDURE — 25000003 PHARM REV CODE 250: Performed by: HOSPITALIST

## 2025-02-19 PROCEDURE — 82570 ASSAY OF URINE CREATININE: CPT | Mod: HCNC | Performed by: HOSPITALIST

## 2025-02-19 PROCEDURE — 25000003 PHARM REV CODE 250

## 2025-02-19 PROCEDURE — 83930 ASSAY OF BLOOD OSMOLALITY: CPT | Mod: HCNC | Performed by: HOSPITALIST

## 2025-02-19 PROCEDURE — 96372 THER/PROPH/DIAG INJ SC/IM: CPT | Performed by: HOSPITALIST

## 2025-02-19 PROCEDURE — 83935 ASSAY OF URINE OSMOLALITY: CPT | Mod: HCNC | Performed by: HOSPITALIST

## 2025-02-19 PROCEDURE — 85025 COMPLETE CBC W/AUTO DIFF WBC: CPT | Mod: HCNC

## 2025-02-19 PROCEDURE — 81003 URINALYSIS AUTO W/O SCOPE: CPT | Mod: HCNC | Performed by: HOSPITALIST

## 2025-02-19 PROCEDURE — 96365 THER/PROPH/DIAG IV INF INIT: CPT | Mod: HCNC

## 2025-02-19 PROCEDURE — 93010 ELECTROCARDIOGRAM REPORT: CPT | Mod: ,,, | Performed by: INTERNAL MEDICINE

## 2025-02-19 PROCEDURE — 25000003 PHARM REV CODE 250: Mod: HCNC | Performed by: HOSPITALIST

## 2025-02-19 PROCEDURE — 99285 EMERGENCY DEPT VISIT HI MDM: CPT | Mod: 25,HCNC

## 2025-02-19 RX ORDER — ATORVASTATIN CALCIUM 20 MG/1
20 TABLET, FILM COATED ORAL DAILY
Status: DISCONTINUED | OUTPATIENT
Start: 2025-02-19 | End: 2025-02-23 | Stop reason: HOSPADM

## 2025-02-19 RX ORDER — ENOXAPARIN SODIUM 100 MG/ML
40 INJECTION SUBCUTANEOUS EVERY 24 HOURS
Status: DISCONTINUED | OUTPATIENT
Start: 2025-02-19 | End: 2025-02-23 | Stop reason: HOSPADM

## 2025-02-19 RX ORDER — ACETAMINOPHEN 500 MG
1000 TABLET ORAL EVERY 8 HOURS PRN
Status: DISCONTINUED | OUTPATIENT
Start: 2025-02-19 | End: 2025-02-23 | Stop reason: HOSPADM

## 2025-02-19 RX ORDER — NALOXONE HCL 0.4 MG/ML
0.02 VIAL (ML) INJECTION
Status: DISCONTINUED | OUTPATIENT
Start: 2025-02-19 | End: 2025-02-23 | Stop reason: HOSPADM

## 2025-02-19 RX ORDER — SODIUM CHLORIDE 9 MG/ML
INJECTION, SOLUTION INTRAVENOUS CONTINUOUS
Status: DISCONTINUED | OUTPATIENT
Start: 2025-02-19 | End: 2025-02-23 | Stop reason: HOSPADM

## 2025-02-19 RX ORDER — OXYCODONE HYDROCHLORIDE 5 MG/1
5 TABLET ORAL EVERY 6 HOURS PRN
Refills: 0 | Status: DISCONTINUED | OUTPATIENT
Start: 2025-02-19 | End: 2025-02-23 | Stop reason: HOSPADM

## 2025-02-19 RX ORDER — DIPHENHYDRAMINE HYDROCHLORIDE 50 MG/ML
25 INJECTION INTRAMUSCULAR; INTRAVENOUS EVERY 6 HOURS
Status: DISCONTINUED | OUTPATIENT
Start: 2025-02-19 | End: 2025-02-20

## 2025-02-19 RX ORDER — ONDANSETRON HYDROCHLORIDE 2 MG/ML
8 INJECTION, SOLUTION INTRAVENOUS EVERY 8 HOURS PRN
Status: DISCONTINUED | OUTPATIENT
Start: 2025-02-19 | End: 2025-02-23 | Stop reason: HOSPADM

## 2025-02-19 RX ORDER — VANCOMYCIN 1.75 GRAM/500 ML IN 0.9 % SODIUM CHLORIDE INTRAVENOUS
25 ONCE
Status: COMPLETED | OUTPATIENT
Start: 2025-02-19 | End: 2025-02-19

## 2025-02-19 RX ORDER — CETIRIZINE HYDROCHLORIDE 10 MG/1
10 TABLET ORAL DAILY
Status: DISCONTINUED | OUTPATIENT
Start: 2025-02-19 | End: 2025-02-23 | Stop reason: HOSPADM

## 2025-02-19 RX ADMIN — VANCOMYCIN HYDROCHLORIDE 1750 MG: 500 INJECTION, POWDER, LYOPHILIZED, FOR SOLUTION INTRAVENOUS at 11:02

## 2025-02-19 RX ADMIN — CETIRIZINE HYDROCHLORIDE 10 MG: 10 TABLET, FILM COATED ORAL at 01:02

## 2025-02-19 RX ADMIN — PIPERACILLIN SODIUM AND TAZOBACTAM SODIUM 4.5 G: 4; .5 INJECTION, POWDER, LYOPHILIZED, FOR SOLUTION INTRAVENOUS at 10:02

## 2025-02-19 RX ADMIN — SODIUM CHLORIDE 200 ML: 9 INJECTION, SOLUTION INTRAVENOUS at 12:02

## 2025-02-19 RX ADMIN — SODIUM CHLORIDE 1000 ML: 9 INJECTION, SOLUTION INTRAVENOUS at 10:02

## 2025-02-19 RX ADMIN — PIPERACILLIN SODIUM AND TAZOBACTAM SODIUM 4.5 G: 4; .5 INJECTION, POWDER, FOR SOLUTION INTRAVENOUS at 05:02

## 2025-02-19 RX ADMIN — DIPHENHYDRAMINE HYDROCHLORIDE 25 MG: 50 INJECTION, SOLUTION INTRAMUSCULAR; INTRAVENOUS at 11:02

## 2025-02-19 RX ADMIN — ACETAMINOPHEN 1000 MG: 500 TABLET, FILM COATED ORAL at 05:02

## 2025-02-19 RX ADMIN — ENOXAPARIN SODIUM 40 MG: 40 INJECTION SUBCUTANEOUS at 05:02

## 2025-02-19 RX ADMIN — DIPHENHYDRAMINE HYDROCHLORIDE 25 MG: 50 INJECTION, SOLUTION INTRAMUSCULAR; INTRAVENOUS at 12:02

## 2025-02-19 RX ADMIN — SODIUM CHLORIDE: 9 INJECTION, SOLUTION INTRAVENOUS at 11:02

## 2025-02-19 RX ADMIN — DIPHENHYDRAMINE HYDROCHLORIDE 25 MG: 50 INJECTION, SOLUTION INTRAMUSCULAR; INTRAVENOUS at 05:02

## 2025-02-19 RX ADMIN — SODIUM CHLORIDE: 9 INJECTION, SOLUTION INTRAVENOUS at 03:02

## 2025-02-19 NOTE — PROGRESS NOTES
"Pharmacokinetic Initial Assessment: IV Vancomycin    Assessment/Plan:    Initiate intravenous vancomycin with loading dose of 1750 mg once followed by a maintenance dose of vancomycin 1500mg IV every 24 hours  Desired empiric serum trough concentration is 10 to 20 mcg/mL  Draw vancomycin trough level 60 min prior to 1100 dose on 2/21 at approximately 1000  Pharmacy will continue to follow and monitor vancomycin.      Please contact pharmacy at extension [phone] with any questions regarding this assessment.     Thank you for the consult,   Toyin Tong       Patient brief summary:  Hugh Bates is a 69 y.o. male initiated on antimicrobial therapy with IV Vancomycin for treatment of suspected bacteremia    Drug Allergies:   Review of patient's allergies indicates:  No Known Allergies    Actual Body Weight:   71.7kg    Renal Function:   Estimated Creatinine Clearance: 58.9 mL/min (based on SCr of 1.2 mg/dL).,     Dialysis Method (if applicable):  N/A    CBC (last 72 hours):  Recent Labs   Lab Result Units 02/17/25  0801 02/19/25  0954   WBC K/uL 7.39 7.90   Hemoglobin g/dL 13.4* 13.1*   Hematocrit % 39.3* 37.5*   Platelets K/uL 187 141*   Gran % % 93.7* 92.7*   Lymph % % 2.3* 1.6*   Mono % % 3.5* 3.5*   Eosinophil % % 0.0 1.1   Basophil % % 0.1 0.6   Differential Method  Automated Automated       Metabolic Panel (last 72 hours):  Recent Labs   Lab Result Units 02/17/25  0801 02/17/25  0803 02/19/25  0954   Sodium mmol/L 134*  --  124*   Potassium mmol/L 4.3  --  4.3   Chloride mmol/L 101  --  90*   CO2 mmol/L 25  --  24   Glucose mg/dL 130*  --  99   Glucose, UA   --  Negative  --    BUN mg/dL 16  --  31*   Creatinine mg/dL 0.9  --  1.2   Albumin g/dL 3.6  --  3.0*   Total Bilirubin mg/dL 0.6  --  1.0   Alkaline Phosphatase U/L 60  --  70   AST U/L 43*  --  37   ALT U/L 50*  --  45*       Drug levels (last 3 results):  No results for input(s): "VANCOMYCINRA", "VANCORANDOM", "VANCOMYCINPE", "VANCOPEAK", " ""VANCOMYCINTR", "VANCOTROUGH" in the last 72 hours.    Microbiologic Results:  Microbiology Results (last 7 days)       Procedure Component Value Units Date/Time    Blood culture x two cultures. Draw prior to antibiotics. [[id number]] Collected: 02/19/25 0955    Order Status: Sent Specimen: Blood from Peripheral, Hand, Left     Blood culture x two cultures. Draw prior to antibiotics. [[id number]] Collected: 02/19/25 0954    Order Status: Sent Specimen: Blood from Peripheral, Antecubital, Right             "

## 2025-02-19 NOTE — SUBJECTIVE & OBJECTIVE
Past Medical History:   Diagnosis Date    Basal cell carcinoma     Urinary tract infection        Past Surgical History:   Procedure Laterality Date    CATARACT EXTRACTION W/  INTRAOCULAR LENS IMPLANT Left 10/7/2024    Procedure: EXTRACTION, CATARACT, WITH IOL INSERTION;  Surgeon: Sepideh Cisneros MD;  Location: [facility] OR;  Service: Ophthalmology;  Laterality: Left;  ORA,     CATARACT EXTRACTION W/  INTRAOCULAR LENS IMPLANT Right 10/28/2024    Procedure: EXTRACTION, CATARACT, WITH IOL INSERTION;  Surgeon: Sepideh Cisneros MD;  Location: [facility] OR;  Service: Ophthalmology;  Laterality: Right;  JANINA     COLONOSCOPY N/A 9/30/2016    Procedure: COLONOSCOPY;  Surgeon: George Virgen MD;  Location: [facility] (4TH FLR);  Service: Colon and Rectal;  Laterality: N/A;    COLONOSCOPY      FRACTURE SURGERY  1974    broken thumb    REPAIR OF RETINAL DETACHMENT WITH VITRECTOMY Left 2/7/2024    Procedure: REPAIR, RETINAL DETACHMENT, WITH VITRECTOMY;  Surgeon: JOHN Ortiz MD;  Location: [facility] OR 1ST FLR;  Service: Ophthalmology;  Laterality: Left;       Review of patient's allergies indicates:  No Known Allergies    No current facility-administered medications on file prior to encounter.     Current Outpatient Medications on File Prior to Encounter   Medication Sig    amoxicillin-clavulanate 875-125mg (AUGMENTIN) 875-125 mg per tablet Take 1 tablet by mouth every 12 (twelve) hours for 7 days.    cephALEXin (KEFLEX) 250 MG capsule Take 1 capsule (250 mg total) by mouth every 12 (twelve) hours. for 7 days    collagen, hydrolysate, bovine, (COLLAGEN, HYDR, BOVINE,, BULK,) 100 % Powd     fish,bora,flax oils-om3,6,9no1 400-400-400 mg Cap Take by mouth.    glucosamine-chondroitin 500-400 mg tablet Take 2 tablets by mouth once daily.    loratadine (CLARITIN) 10 mg tablet     multivitamin (THERAGRAN) per tablet Take 1 tablet by mouth once daily.    PNEUMOVAX-23 25 mcg/0.5 mL vaccine     prednisolon/gatiflox/bromfenac (PREDNISOL  ACE-GATIFLOX-BROMFEN) 1-0.5-0.075 % DrpS Apply 1 drop to eye 3 (three) times daily. in operative eye for 1 month after surgery    rosuvastatin (CRESTOR) 5 MG tablet Take 1 tablet (5 mg total) by mouth once daily.    RSVPreF3 antigen-AS01E, PF, (AREXVY) 120 mcg/0.5 mL SusR vaccine Inject 0.5mL into the muscle once     Family History       Problem Relation (Age of Onset)    Arthritis Maternal Grandmother    Cancer Father    Cataracts Mother    Colon polyps Mother    Early death Father    Hearing loss Mother    No Known Problems Sister, Sister, Sister, Brother, Maternal Aunt, Maternal Uncle, Paternal Aunt, Paternal Uncle, Maternal Grandfather, Paternal Grandmother, Paternal Grandfather, Other    Squamous cell carcinoma Mother          Tobacco Use    Smoking status: Never    Smokeless tobacco: Never   Substance and Sexual Activity    Alcohol use: Yes     Alcohol/week: 7.0 standard drinks of alcohol     Types: 1 Glasses of wine, 6 Cans of beer per week    Drug use: Never    Sexual activity: Yes     Partners: Female     Birth control/protection: None     Review of Systems   Constitutional:  Positive for chills and fever. Negative for diaphoresis and fatigue.   HENT:  Negative for congestion, ear pain, hearing loss and tinnitus.    Eyes:  Negative for photophobia, pain, discharge and visual disturbance.   Respiratory:  Negative for chest tightness, shortness of breath and wheezing.    Cardiovascular:  Negative for chest pain, palpitations and leg swelling.   Gastrointestinal:  Negative for abdominal distention, abdominal pain, blood in stool, constipation, diarrhea, nausea and vomiting.   Endocrine: Negative for cold intolerance, heat intolerance, polydipsia, polyphagia and polyuria.   Genitourinary:  Negative for dysuria, flank pain, frequency, hematuria and urgency.   Musculoskeletal:  Negative for arthralgias, back pain, gait problem, myalgias and neck pain.   Skin:  Positive for color change, rash and wound. Negative  for pallor.   Allergic/Immunologic: Negative for environmental allergies, food allergies and immunocompromised state.   Neurological:  Negative for seizures, syncope, facial asymmetry, weakness and headaches.   Psychiatric/Behavioral:  Negative for agitation, behavioral problems, confusion and hallucinations.      Objective:     Vital Signs (Most Recent):  Temp: 97.9 °F (36.6 °C) (02/19/25 0936)  Pulse: 61 (02/19/25 1144)  Resp: 13 (02/19/25 1144)  BP: (!) 97/55 (02/19/25 1102)  SpO2: 100 % (02/19/25 1144) Vital Signs (24h Range):  Temp:  [97.9 °F (36.6 °C)] 97.9 °F (36.6 °C)  Pulse:  [58-62] 61  Resp:  [11-15] 13  SpO2:  [95 %-100 %] 100 %  BP: (92-99)/(54-60) 97/55     Weight: 71.7 kg (158 lb)  Body mass index is 23.33 kg/m².     Physical Exam  Constitutional:       General: He is not in acute distress.     Appearance: He is not diaphoretic.   HENT:      Head: Normocephalic and atraumatic.      Nose: Nose normal.      Mouth/Throat:      Pharynx: No oropharyngeal exudate.   Eyes:      General: No scleral icterus.        Right eye: No discharge.         Left eye: No discharge.      Conjunctiva/sclera: Conjunctivae normal.      Pupils: Pupils are equal, round, and reactive to light.   Neck:      Thyroid: No thyromegaly.      Vascular: No JVD.      Trachea: No tracheal deviation.   Cardiovascular:      Rate and Rhythm: Normal rate and regular rhythm.      Heart sounds: Normal heart sounds. No murmur heard.     No friction rub. No gallop.   Pulmonary:      Effort: Pulmonary effort is normal. No respiratory distress.      Breath sounds: Normal breath sounds. No stridor. No wheezing or rales.   Chest:      Chest wall: No tenderness.   Abdominal:      General: Bowel sounds are normal. There is no distension.      Palpations: Abdomen is soft. There is no mass.      Tenderness: There is no abdominal tenderness. There is no guarding or rebound.   Musculoskeletal:         General: No tenderness. Normal range of motion.       Cervical back: Normal range of motion and neck supple.   Lymphadenopathy:      Cervical: No cervical adenopathy.   Skin:     General: Skin is warm and dry.      Coloration: Skin is not pale.      Findings: Erythema and rash present.      Comments: See photo   Neurological:      Mental Status: He is alert and oriented to person, place, and time.      Cranial Nerves: No cranial nerve deficit.      Motor: No abnormal muscle tone.      Coordination: Coordination normal.      Deep Tendon Reflexes: Reflexes are normal and symmetric. Reflexes normal.   Psychiatric:         Behavior: Behavior normal.         Thought Content: Thought content normal.         Judgment: Judgment normal.              CRANIAL NERVES     CN III, IV, VI   Pupils are equal, round, and reactive to light.         Significant Labs: All pertinent labs within the past 24 hours have been reviewed.    Significant Imaging: I have reviewed all pertinent imaging results/findings within the past 24 hours.

## 2025-02-19 NOTE — ASSESSMENT & PLAN NOTE
Patient presents with subjective fevers, chills, and worsening erythema and tenderness involving right axillae and right chest wall concerning for worsening cellulitis with treatment failure with oral antibiotics.  Mildly hypotensive but not in shock.  White count normal range but with neutrophil prominence consistent with bacteria infection.  Area of initial injury to right hand seems to be improved but might have been source of infection.  Allergic reaction to cefalexin also possible but would expect to see more of a symmetric skin reaction.  Avoid cefalexin for now.  Treat with diphenhydramine.  Continue to cover broad-spectrum antibiotics pending blood culture results and response to treatment.

## 2025-02-19 NOTE — HPI
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  He reports bleeding from his abrasion and he placed a Band-Aid with resolution of bleeding.  He reports developed diffuse muscle pains on Saturday/ which he initially attributed to recent exercise along with statin use.  However he reports he developed worsening right arm pain with chills and low grade fever.  Patient evaluated in the emergency department and prescribed cephalexin to treat cellulitis.  Patient reports taking 4 doses of cephalexin without improvement and noted worsening pain in his right axilla and redness that extended to his right chest wall and spread down the right side towards his waistline.  He denies any redness elsewhere.    He denies any known drug allergies.  He takes loratadine for allergic sinusitis. [place] [facility] [place] [facility] and maybe touched the water with his right hand but otherwise denies any other recent exposures.  He reports not any any raw oysters or other uncooked food. No recent sick contacts. No wheezing or shortness of breath or cough. No nausea, vomiting, diarrhea, or abdominal pain.     He reports non-tobacco smoker. No history of substance abuse.  He reports that his father  of lung cancer at 43 years of age but otherwise denies any other family history.  He reports he is mostly retried .

## 2025-02-19 NOTE — PLAN OF CARE-OVED
[facility] Emergency Department Plan of Care Note  Referral Source: Primary Care Provider                               Chief Complaint   Patient presents with    Rash     Concern for infection     He was seen in the ED Monday and dx w/ axillary lymphadenopathy and cellulitis from an abrasion on his hand and started on keflex.  PA called from clinic with concerns from systemic infection / lymphadenitis / erythema on torso. Pictures included.  Will likely need admission for iv abx.   Recommendation: Emergency Department            Emergency Department: [employer]               No diagnosis found.

## 2025-02-19 NOTE — PLAN OF CARE
Problem: Adult Inpatient Plan of Care  Goal: Plan of Care Review  Outcome: Progressing  Goal: Patient-Specific Goal (Individualized)  Outcome: Progressing  Goal: Absence of Hospital-Acquired Illness or Injury  Outcome: Progressing  Goal: Optimal Comfort and Wellbeing  Outcome: Progressing  Goal: Readiness for Transition of Care  Outcome: Progressing     Problem: Sepsis/Septic Shock  Goal: Optimal Coping  Outcome: Progressing  Goal: Absence of Bleeding  Outcome: Progressing  Goal: Blood Glucose Level Within Targeted Range  Outcome: Progressing  Goal: Absence of Infection Signs and Symptoms  Outcome: Progressing  Goal: Optimal Nutrition Intake  Outcome: Progressing     Problem: Infection  Goal: Absence of Infection Signs and Symptoms  Outcome: Progressing     Problem: Pain Acute  Goal: Optimal Pain Control and Function  Outcome: Progressing

## 2025-02-19 NOTE — ED PROVIDER NOTES
Encounter Date: 2/19/2025    SCRIBE #1 NOTE: I, Mike Clements, am scribing for, and in the presence of,  Vi Irby MD. I have scribed the following portions of the note - Other sections scribed: HPI.       History     Chief Complaint   Patient presents with    Wound Infection     69 y.o. male to ED with c.o. cut to knuckle on right pointer finger on 2/14, patient had progressive redness and tenderness up the right arm and down right chest and right abdomen. Patient was seen in the ER at [place] on Saturday and was referred to PCP. Patient went to PCP this morning who referred him to ER here. Patient endorses a fever and vomiting on Monday which has resolved and patient also c.o. diarrhea. Patient denies all other medical complaints at this time.     70 y/o male with a past medical history of axillary lymphadenopathy and cellulitis, diagnosed on 2/17, presents to the ED for worsening wound infection. The patient sustained a right knuckle laceration on 2/14 while cleaning a heavily soiled room with gloves. He was evaluated on 2/17, diagnosed with cellulitis secondary to the abrasion, and started on Keflex. Today, the clinic PA called with concerns for systemic infection, lymphadenitis, and erythema spreading to the torso. The patient reports worsening redness and tenderness extending from the axilla to the right upper chest area. He denies chest pain or SOB. He has been taking Keflex (2 doses on 2/18, 2 on 2/17, and 1 this morning). He denies fever currently but had a mild fever of 99.5°F on arrival to the ED on 2/17. He reports dark urine yesterday but denies other urinary symptoms. Additionally, he developed diarrhea since starting antibiotics, with associated abdominal pain during episodes. For pain, he has been using Tylenol and ibuprofen with minimal relief. He also received a Tdap booster on 2/17.      The history is provided by the patient. No  was used.     Review of patient's allergies  indicates:  No Known Allergies  Past Medical History:   Diagnosis Date    Basal cell carcinoma     Urinary tract infection      Past Surgical History:   Procedure Laterality Date    CATARACT EXTRACTION W/  INTRAOCULAR LENS IMPLANT Left 10/7/2024    Procedure: EXTRACTION, CATARACT, WITH IOL INSERTION;  Surgeon: Sepideh Cisneros MD;  Location: [facility] OR;  Service: Ophthalmology;  Laterality: Left;  [facility]    CATARACT EXTRACTION W/  INTRAOCULAR LENS IMPLANT Right 10/28/2024    Procedure: EXTRACTION, CATARACT, WITH IOL INSERTION;  Surgeon: Sepideh Cisneros MD;  Location: [facility] OR;  Service: Ophthalmology;  Laterality: Right;  [facility]    COLONOSCOPY N/A 2016    Procedure: COLONOSCOPY;  Surgeon: George Virgen MD;  Location: [facility] ENDO (4TH FLR);  Service: Colon and Rectal;  Laterality: N/A;    COLONOSCOPY      FRACTURE SURGERY      broken thumb    REPAIR OF RETINAL DETACHMENT WITH VITRECTOMY Left 2024    Procedure: REPAIR, RETINAL DETACHMENT, WITH VITRECTOMY;  Surgeon: JOHN Ortiz MD;  Location: [facility] OR 1ST FLR;  Service: Ophthalmology;  Laterality: Left;     Family History   Problem Relation Name Age of Onset    Colon polyps Mother Jimmy Bates     Squamous cell carcinoma Mother Jimmy Bates     Hearing loss Mother Jimmy Bates         Bad hearing, but will not get hearing aid    Cataracts Mother Jimmy Bates     Cancer Father Cristian RIDDLE Jana Aleman.          in     Early death Father Cristian RIDDLE Jana Aleman.         was 44 in , [employer]    No Known Problems Sister      No Known Problems Sister      No Known Problems Sister      No Known Problems Brother      No Known Problems Maternal Aunt      No Known Problems Maternal Uncle      No Known Problems Paternal Aunt      No Known Problems Paternal Uncle      Arthritis Maternal Grandmother Michaela Wiseman     No Known Problems Maternal Grandfather      No Known Problems Paternal Grandmother      No Known Problems Paternal  Grandfather      No Known Problems Other      Diabetes Neg Hx      Melanoma Neg Hx       Social History[1]      Physical Exam     Initial Vitals [02/19/25 0936]   BP Pulse Resp Temp SpO2   (!) 92/55 62 14 97.9 °F (36.6 °C) 99 %      MAP       --         Physical Exam    Nursing note and vitals reviewed.  Constitutional: He is not diaphoretic. No distress.   HENT:   Head: Normocephalic and atraumatic.   Neck: Neck supple.   Right axillary lymphadenopathy   Normal range of motion.  Cardiovascular:  Normal rate and regular rhythm.           Pulmonary/Chest: Breath sounds normal. No respiratory distress. He has no wheezes. He has no rhonchi. He has no rales.   Abdominal: Abdomen is soft. He exhibits no distension. There is no abdominal tenderness. There is no rebound and no guarding.   Musculoskeletal:         General: No edema.      Cervical back: Normal range of motion and neck supple.     Neurological: He is alert.   Skin: Skin is warm and dry. Rash noted. There is erythema.   Psychiatric: He has a normal mood and affect. Thought content normal.         ED Course   Procedures  Labs Reviewed   CBC W/ AUTO DIFFERENTIAL - Abnormal       Result Value    WBC 7.90      RBC 4.06 (*)     Hemoglobin 13.1 (*)     Hematocrit 37.5 (*)     MCV 92      MCH 32.3 (*)     MCHC 34.9      RDW 12.5      Platelets 141 (*)     MPV 10.5      Immature Granulocytes 0.5      Gran # (ANC) 7.3      Immature Grans (Abs) 0.04      Lymph # 0.1 (*)     Mono # 0.3      Eos # 0.1      Baso # 0.05      nRBC 0      Gran % 92.7 (*)     Lymph % 1.6 (*)     Mono % 3.5 (*)     Eosinophil % 1.1      Basophil % 0.6      Platelet Estimate Appears normal      Differential Method Automated     COMPREHENSIVE METABOLIC PANEL - Abnormal    Sodium 124 (*)     Potassium 4.3      Chloride 90 (*)     CO2 24      Glucose 99      BUN 31 (*)     Creatinine 1.2      Calcium 8.8      Total Protein 6.4      Albumin 3.0 (*)     Total Bilirubin 1.0      Alkaline Phosphatase  70      AST 37      ALT 45 (*)     eGFR >60      Anion Gap 10     URINALYSIS, REFLEX TO URINE CULTURE - Abnormal    Specimen UA Urine, Clean Catch      Color, UA Yellow      Appearance, UA Clear      pH, UA 6.0      Specific Gravity, UA 1.010      Protein, UA Trace (*)     Glucose, UA Negative      Ketones, UA Negative      Bilirubin (UA) Negative      Occult Blood UA Trace (*)     Nitrite, UA Negative      Urobilinogen, UA Negative      Leukocytes, UA Negative      Narrative:     Specimen Source->Urine   BASIC METABOLIC PANEL - Abnormal    Sodium 127 (*)     Potassium 3.9      Chloride 97      CO2 23      Glucose 94      BUN 26 (*)     Creatinine 0.9      Calcium 7.8 (*)     Anion Gap 7 (*)     eGFR >60     OSMOLALITY, SERUM - Abnormal    Osmolality 269 (*)    CULTURE, BLOOD   CULTURE, BLOOD   HEPATITIS C ANTIBODY    Hepatitis C Ab Negative      Narrative:     Release to patient->Immediate   HIV 1 / 2 ANTIBODY    HIV 1/2 Ag/Ab Negative      Narrative:     Release to patient->Immediate   LACTIC ACID, PLASMA    Lactate (Lactic Acid) 1.0     OSMOLALITY, URINE RANDOM    Osmolality, Urine 223      Narrative:     Specimen Source->Urine   SODIUM, URINE, RANDOM   CREATININE, URINE, RANDOM   TSH   ISTAT LACTATE    POC Lactate 1.24      Sample VENOUS       EKG Readings: (Independently Interpreted)   Initial Reading: No STEMI. Previous EKG: Compared with most recent EKG Rhythm: Normal Sinus Rhythm. Heart Rate: 58. ST Segments: Normal ST Segments. T Waves: Normal.   Artifact present in V3     ECG Results              EKG 12-lead (Final result)        Collection Time Result Time QRS Duration OHS QTC Calculation    02/19/25 10:03:01 02/19/25 13:34:07 94 394                     Final result by Interface, Lab [facility] (02/19/25 13:34:11)                   Narrative:    Test Reason : Z13.6,    Vent. Rate :  58 BPM     Atrial Rate :  58 BPM     P-R Int : 196 ms          QRS Dur :  94 ms      QT Int : 402 ms       P-R-T Axes : 142   77  89 degrees    QTcB Int : 394 ms    Unusual P axis, possible ectopic atrial bradycardia  Abnormal ECG    Confirmed by Cornelio Collins ([id number]) on 2/19/2025 1:34:04 PM    Referred By: AAAREFERRAL SELF           Confirmed By: Cornelio Collins                                  Imaging Results              X-Ray Chest AP Portable (Final result)  Result time 02/19/25 10:47:49      Final result by Alem Estrella MD (02/19/25 10:47:49)                   Impression:      No acute cardiopulmonary process seen.      Electronically signed by: Alem Estrella  Date:    02/19/2025  Time:    10:47               Narrative:    EXAMINATION:  XR CHEST AP PORTABLE    CLINICAL HISTORY:  Sepsis;    TECHNIQUE:  Single frontal view of the chest was performed.    COMPARISON:  02/17/2025    FINDINGS:  Lungs are well expanded.  No acute consolidation, pleural effusion, or pneumothorax.    Cardiac silhouette is normal in size.  Calcified plaque lines arch.                                       Medications   diphenhydrAMINE injection 25 mg (25 mg Intravenous Given 2/19/25 1219)   vancomycin - pharmacy to dose (has no administration in time range)   piperacillin-tazobactam (ZOSYN) 4.5 g in D5W 100 mL IVPB (MB+) (has no administration in time range)   vancomycin 1,500 mg in 0.9% NaCl 250 mL IVPB (admixture device) (1,500 mg Intravenous Trough Due As Scheduled Before Dose 2/21/25 1000)   cetirizine tablet 10 mg (10 mg Oral Given 2/19/25 1339)   atorvastatin tablet 20 mg (20 mg Oral Not Given 2/19/25 1245)   naloxone 0.4 mg/mL injection 0.02 mg (has no administration in time range)   enoxaparin injection 40 mg (has no administration in time range)   acetaminophen tablet 1,000 mg (has no administration in time range)   oxyCODONE immediate release tablet 5 mg (has no administration in time range)   ondansetron injection 8 mg (has no administration in time range)   sodium chloride 0.9% bolus 1,000 mL 1,000 mL (0 mLs Intravenous Stopped 2/19/25 1056)    vancomycin 1750 mg in 0.9% sodium chloride 500 mL IVPB (0 mg Intravenous Stopped 2/19/25 1322)   sodium chloride 0.9% bolus 1,000 mL 1,000 mL (0 mLs Intravenous Stopped 2/19/25 1220)   sodium chloride 0.9% bolus 200 mL 200 mL (0 mLs Intravenous Stopped 2/19/25 1322)     Medical Decision Making  68 y/o male with a past medical history of axillary lymphadenopathy and cellulitis, diagnosed on 2/17, presents to the ED for worsening erythema spreading to the right upper chest/torso.  Sepsis orders placed.  Erythema is non pruritic, low suspicion for allergic reaction.  Hypotensive on arrival.  IV fluids ordered.  Anticipate admission to the hospital for IV antibiotics given several days of Keflex without resolution.  See ED course.    Amount and/or Complexity of Data Reviewed  External Data Reviewed: labs, radiology, ECG and notes.  Labs: ordered. Decision-making details documented in ED Course.  Radiology: ordered and independent interpretation performed. Decision-making details documented in ED Course.  ECG/medicine tests: ordered and independent interpretation performed. Decision-making details documented in ED Course.    Risk  Prescription drug management.               ED Course as of 02/19/25 1436   Wed Feb 19, 2025   1040 Comprehensive metabolic panel(!)  CMP grossly unremarkable.  No significant acidosis. Normal kidney function. Normal LFTs. [KL]   1041 POC Lactate: 1.24  Lactate within normal limits. [KL]   1047 CBC auto differential(!)  CBC is grossly unremarkable.  No leukocytosis.  Chronic, stable anemia.  [KL]   1048 BP(!): 97/54  Blood pressure remains soft.  Will order additional IV fluids. [KL]   1048 CXR shows no acute disease per my independent interpretation. [KL]   1103 X-Ray Chest AP Portable  CXR shows no acute disease per my independent interpretation. [KL]   1103 This patient does have evidence of infective focus  My overall impression is sepsis.  Source: Skin and Soft Tissue (location RUE,  "torso)  Antibiotics given- Antibiotics (72h ago, onward)    Start     Stop Route Frequency Ordered    02/19/25 1000  piperacillin-tazobactam (ZOSYN) 4.5 g in D5W 100 mL IVPB   (MB+)  (ED Adult Sepsis Treatment)         02/20/25 0959 IV Every 8 hours (non-standard times) 02/19/25 0951    02/19/25 1000  vancomycin 1750 mg in 0.9% sodium chloride 500 mL IVPB    (ED Adult Sepsis Treatment)         -- IV Once 02/19/25 0951      Latest lactate reviewed-  @[id number](lactate,poclac)@  Organ dysfunction indicated by n/a    Fluid challenge Actual Body weight- Patient will receive 30ml/kg actual body weight to calculate fluid bolus for treatment of septic shock.     Post- resuscitation assessment Yes - I attest a sepsis perfusion exam was performed within 6 hours of sepsis, severe sepsis, or septic shock presentation, following fluid resuscitation.      Will Start Pressors- Levophed for MAP of 65  Source control achieved by:  Karthikeyan and Corasyn   [KL]   1105 Critical care time spent on the evaluation and treatment of severe organ dysfunction, review of pertinent labs and imaging studies, discussions with consulting providers and discussions with patient/family: 30 minutes.   [KL]   1125 Discussed case with Hospital Medicine. Plan for observation in the hospital for continued monitoring, evaluation and treatment.  Urinalysis, blood cultures pending at the time of observation. To be followed by inpatient team.  [KL]      ED Course User Index  [KL] Vi Irby MD    Sepsis Perfusion Assessment: "I attest a sepsis perfusion exam was performed within 6 hours of sepsis, severe sepsis, or septic shock presentation, following fluid resuscitation."                      Clinical Impression:  Final diagnoses:  [Z13.6] Screening for cardiovascular condition  [A41.9] Sepsis  [L03.313] Cellulitis of chest wall (Primary)  [L03.311] Cellulitis of abdominal wall     IMike, personally performed the services described in this " documentation. All medical record entries made by the scribe were at my direction and in my presence.  I have reviewed the chart and agree that the record reflects my personal performance and is accurate and complete.      ED Disposition Condition    Observation Stable           Physician Attestation for Scribe: I, Vi Irby, reviewed documentation as scribed in my presence, which is both accurate and complete.          [1]   Social History  Tobacco Use    Smoking status: Never    Smokeless tobacco: Never   Substance Use Topics    Alcohol use: Yes     Alcohol/week: 7.0 standard drinks of alcohol     Types: 1 Glasses of wine, 6 Cans of beer per week    Drug use: Never        Vi Irby MD  02/19/25 9839

## 2025-02-19 NOTE — ASSESSMENT & PLAN NOTE
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  Serum sodium worse now.  Suspect some degree of volume depletion now.  Patient given isotonic fluids in the emergency department due to concern for sepsis with hypotension.  Repeat chemistry with serum and urine osmolality and TSH.

## 2025-02-19 NOTE — H&P
[facility] Medicine  History & Physical    Patient Name: Hugh Bates  MRN: [id number]  Patient Class: OP- Observation  Admission Date: 2/19/2025  Attending Physician: Johny Iglesias MD   Primary Care Provider: Joseph Santana MD         Patient information was obtained from patient, spouse/SO, past medical records, and ER records.     Subjective:     Principal Problem:Sepsis    Chief Complaint:   Chief Complaint   Patient presents with    Wound Infection     69 y.o. male to ED with c.o. cut to knuckle on right pointer finger on 2/14, patient had progressive redness and tenderness up the right arm and down right chest and right abdomen. Patient was seen in the ER at [place] on Saturday and was referred to PCP. Patient went to PCP this morning who referred him to ER here. Patient endorses a fever and vomiting on Monday which has resolved and patient also c.o. diarrhea. Patient denies all other medical complaints at this time.        HPI: Patient is a 69 year-old man with history of dyslipidemia who reports suffering an abrasion to his right hand while cleaning her late mother's house in [facility] this last Friday on 2/14/2024.  He reports bleeding from his abrasion and he placed a Band-Aid with resolution of bleeding.  He reports developed diffuse muscle pains on Saturday/Sunday which he initially attributed to recent exercise along with statin use.  However he reports he developed worsening right arm pain with chills and low grade fever.  Patient evaluated in the emergency department and prescribed cephalexin to treat cellulitis.  Patient reports taking 4 doses of cephalexin without improvement and noted worsening pain in his right axilla and redness that extended to his right chest wall and spread down the right side towards his waistline.  He denies any redness elsewhere.    He denies any known drug allergies.  He takes loratadine for allergic sinusitis. Reports he [place] and maybe touched the water with his right hand but otherwise denies any other recent exposures.  He reports not any any raw oysters or other uncooked food. No recent sick contacts. No wheezing or shortness of breath or cough. No nausea, vomiting, diarrhea, or abdominal pain.     He reports non-tobacco smoker. No history of substance abuse.  He reports that his father  of lung cancer at 43 years of age but otherwise denies any other family history.  He reports he is mostly retried .    Past Medical History:   Diagnosis Date    Basal cell carcinoma     Urinary tract infection        Past Surgical History:   Procedure Laterality Date    CATARACT EXTRACTION W/  INTRAOCULAR LENS IMPLANT Left 10/7/2024    Procedure: EXTRACTION, CATARACT, WITH IOL INSERTION;  Surgeon: Sepideh Cisneros MD;  Location: [facility] OR;  Service: Ophthalmology;  Laterality: Left;  ORA, [facility]    CATARACT EXTRACTION W/  INTRAOCULAR LENS IMPLANT Right 10/28/2024    Procedure: EXTRACTION, CATARACT, WITH IOL INSERTION;  Surgeon: Sepideh Cisneros MD;  Location: [facility] OR;  Service: Ophthalmology;  Laterality: Right;  JANINA     COLONOSCOPY N/A 2016    Procedure: COLONOSCOPY;  Surgeon: George Virgen MD;  Location: [facility] (4TH FLR);  Service: Colon and Rectal;  Laterality: N/A;    COLONOSCOPY      FRACTURE SURGERY  1974    broken thumb    REPAIR OF RETINAL DETACHMENT WITH VITRECTOMY Left 2024    Procedure: REPAIR, RETINAL DETACHMENT, WITH VITRECTOMY;  Surgeon: JOHN Ortiz MD;  Location: [facility] OR [facility] FLR;  Service: Ophthalmology;  Laterality: Left;       Review of patient's allergies indicates:  No Known Allergies    No current facility-administered medications on file prior to encounter.     Current Outpatient Medications on File Prior to Encounter   Medication Sig    amoxicillin-clavulanate 875-125mg (AUGMENTIN) 875-125 mg per tablet Take 1 tablet by mouth every 12 (twelve) hours for 7 days.     cephALEXin (KEFLEX) 250 MG capsule Take 1 capsule (250 mg total) by mouth every 12 (twelve) hours. for 7 days    collagen, hydrolysate, bovine, (COLLAGEN, HYDR, BOVINE,, BULK,) 100 % Powd     fish,bora,flax oils-om3,6,9no1 400-400-400 mg Cap Take by mouth.    glucosamine-chondroitin 500-400 mg tablet Take 2 tablets by mouth once daily.    loratadine (CLARITIN) 10 mg tablet     multivitamin (THERAGRAN) per tablet Take 1 tablet by mouth once daily.    PNEUMOVAX-23 25 mcg/0.5 mL vaccine     prednisolon/gatiflox/bromfenac (PREDNISOL ACE-GATIFLOX-BROMFEN) 1-0.5-0.075 % DrpS Apply 1 drop to eye 3 (three) times daily. in operative eye for 1 month after surgery    rosuvastatin (CRESTOR) 5 MG tablet Take 1 tablet (5 mg total) by mouth once daily.    RSVPreF3 antigen-AS01E, PF, (AREXVY) 120 mcg/0.5 mL SusR vaccine Inject 0.5mL into the muscle once     Family History       Problem Relation (Age of Onset)    Arthritis Maternal Grandmother    Cancer Father    Cataracts Mother    Colon polyps Mother    Early death Father    Hearing loss Mother    No Known Problems Sister, Sister, Sister, Brother, Maternal Aunt, Maternal Uncle, Paternal Aunt, Paternal Uncle, Maternal Grandfather, Paternal Grandmother, Paternal Grandfather, Other    Squamous cell carcinoma Mother          Tobacco Use    Smoking status: Never    Smokeless tobacco: Never   Substance and Sexual Activity    Alcohol use: Yes     Alcohol/week: 7.0 standard drinks of alcohol     Types: 1 Glasses of wine, 6 Cans of beer per week    Drug use: Never    Sexual activity: Yes     Partners: Female     Birth control/protection: None     Review of Systems   Constitutional:  Positive for chills and fever. Negative for diaphoresis and fatigue.   HENT:  Negative for congestion, ear pain, hearing loss and tinnitus.    Eyes:  Negative for photophobia, pain, discharge and visual disturbance.   Respiratory:  Negative for chest tightness, shortness of breath and wheezing.     Cardiovascular:  Negative for chest pain, palpitations and leg swelling.   Gastrointestinal:  Negative for abdominal distention, abdominal pain, blood in stool, constipation, diarrhea, nausea and vomiting.   Endocrine: Negative for cold intolerance, heat intolerance, polydipsia, polyphagia and polyuria.   Genitourinary:  Negative for dysuria, flank pain, frequency, hematuria and urgency.   Musculoskeletal:  Negative for arthralgias, back pain, gait problem, myalgias and neck pain.   Skin:  Positive for color change, rash and wound. Negative for pallor.   Allergic/Immunologic: Negative for environmental allergies, food allergies and immunocompromised state.   Neurological:  Negative for seizures, syncope, facial asymmetry, weakness and headaches.   Psychiatric/Behavioral:  Negative for agitation, behavioral problems, confusion and hallucinations.      Objective:     Vital Signs (Most Recent):  Temp: 97.9 °F (36.6 °C) (02/19/25 0936)  Pulse: 61 (02/19/25 1144)  Resp: 13 (02/19/25 1144)  BP: (!) 97/55 (02/19/25 1102)  SpO2: 100 % (02/19/25 1144) Vital Signs (24h Range):  Temp:  [97.9 °F (36.6 °C)] 97.9 °F (36.6 °C)  Pulse:  [58-62] 61  Resp:  [11-15] 13  SpO2:  [95 %-100 %] 100 %  BP: (92-99)/(54-60) 97/55     Weight: 71.7 kg (158 lb)  Body mass index is 23.33 kg/m².     Physical Exam  Constitutional:       General: He is not in acute distress.     Appearance: He is not diaphoretic.   HENT:      Head: Normocephalic and atraumatic.      Nose: Nose normal.      Mouth/Throat:      Pharynx: No oropharyngeal exudate.   Eyes:      General: No scleral icterus.        Right eye: No discharge.         Left eye: No discharge.      Conjunctiva/sclera: Conjunctivae normal.      Pupils: Pupils are equal, round, and reactive to light.   Neck:      Thyroid: No thyromegaly.      Vascular: No JVD.      Trachea: No tracheal deviation.   Cardiovascular:      Rate and Rhythm: Normal rate and regular rhythm.      Heart sounds: Normal  heart sounds. No murmur heard.     No friction rub. No gallop.   Pulmonary:      Effort: Pulmonary effort is normal. No respiratory distress.      Breath sounds: Normal breath sounds. No stridor. No wheezing or rales.   Chest:      Chest wall: No tenderness.   Abdominal:      General: Bowel sounds are normal. There is no distension.      Palpations: Abdomen is soft. There is no mass.      Tenderness: There is no abdominal tenderness. There is no guarding or rebound.   Musculoskeletal:         General: No tenderness. Normal range of motion.      Cervical back: Normal range of motion and neck supple.   Lymphadenopathy:      Cervical: No cervical adenopathy.   Skin:     General: Skin is warm and dry.      Coloration: Skin is not pale.      Findings: Erythema and rash present.      Comments: See photo   Neurological:      Mental Status: He is alert and oriented to person, place, and time.      Cranial Nerves: No cranial nerve deficit.      Motor: No abnormal muscle tone.      Coordination: Coordination normal.      Deep Tendon Reflexes: Reflexes are normal and symmetric. Reflexes normal.   Psychiatric:         Behavior: Behavior normal.         Thought Content: Thought content normal.         Judgment: Judgment normal.              CRANIAL NERVES     CN III, IV, VI   Pupils are equal, round, and reactive to light.         Significant Labs: All pertinent labs within the past 24 hours have been reviewed.    Significant Imaging: I have reviewed all pertinent imaging results/findings within the past 24 hours.  Assessment/Plan:     * Sepsis  Patient presents with subjective fevers, chills, and worsening erythema and tenderness involving right axillae and right chest wall concerning for worsening cellulitis with treatment failure with oral antibiotics.  Mildly hypotensive but not in shock.  White count normal range but with neutrophil prominence consistent with bacteria infection.  Area of initial injury to right hand seems  to be improved but might have been source of infection.  Allergic reaction to cefalexin also possible but would expect to see more of a symmetric skin reaction.  Avoid cefalexin for now.  Treat with diphenhydramine.  Continue to cover broad-spectrum antibiotics pending blood culture results and response to treatment.    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  Serum sodium worse now.  Suspect some degree of volume depletion now.  Patient given isotonic fluids in the emergency department due to concern for sepsis with hypotension.  Repeat chemistry with serum and urine osmolality and TSH.    Dyslipidemia  Continue statin therapy.        VTE Risk Mitigation (From admission, onward)           Ordered     enoxaparin injection 40 mg  Daily         02/19/25 1237     IP VTE HIGH RISK PATIENT  Once         02/19/25 1237     Place sequential compression device  Until discontinued         02/19/25 1237                      Johny Iglesias MD  Department of Hospital Medicine  [facility]

## 2025-02-19 NOTE — PROGRESS NOTES
Subjective:       Patient ID: Hugh Bates is a 69 y.o. male with history of GERD, HLD, lumbar DDD, left retinal detachment.    Chief Complaint: Cellulitis, unspecified cellulitis site [L03.90]    Patient is new to me, PCP is Dr. Santana. Here today for the following:    History of Present Illness    CHIEF COMPLAINT:  Patient presents today for evaluation of a spreading infection on his right arm and chest.    HISTORY OF PRESENT ILLNESS:  He sustained a cut on his knuckle while cleaning a particularly dirty room with gloves on Friday, initially treating it with liquid bandage. By Saturday and Sunday, he developed slight right axilla soreness. He went to the gym Saturday and walked on the beach Sunday. Symptoms significantly worsened Monday morning with chills, increased pain, vomiting, and diarrhea. Presented to ED 2/17 with temp of 99.5 F. Workup largely normal with chest XR and blood work. Given Keflex for 5 days. Today, presents for spreading redness and tenderness from the axilla to the breast area. He also has had diarrhea since starting the antibiotics, but denies any chills or vomiting. He has reduced food intake but is maintaining hydration. Had Tdap booster Monday since last was 2015.     CURRENT MEDICATIONS:  He was prescribed Keflex at the ER with last dose taken at 7 AM today. He has been using Tylenol or Ibuprofen for pain management with good relief.    MEDICAL HISTORY:  He has a history of skin cancer. He denies history of high blood pressure or blood clots.    ROS:  General: -fever, +chills, -fatigue, -weight gain, -weight loss  Eyes: -vision changes, -redness, -discharge  ENT: -ear pain, -nasal congestion, -sore throat  Cardiovascular: -chest pain, -palpitations, -lower extremity edema  Respiratory: -cough, -shortness of breath  Gastrointestinal: -abdominal pain, -nausea, +vomiting, +diarrhea, -constipation, -blood in stool  Genitourinary: -dysuria, -hematuria, -frequency  Musculoskeletal:  "-joint pain, +muscle pain  Skin: -rash, -lesion  Neurological: -headache, -dizziness, -numbness, -tingling  Psychiatric: -anxiety, -depression, -sleep difficulty          Current Outpatient Medications   Medication Instructions    amoxicillin-clavulanate 875-125mg (AUGMENTIN) 875-125 mg per tablet Take 1 tablet by mouth every 12 (twelve) hours for 7 days.    cephALEXin (KEFLEX) 250 mg, Oral, Every 12 hours    collagen, hydrolysate, bovine, (COLLAGEN, HYDR, BOVINE,, BULK,) 100 % Powd No dose, route, or frequency recorded.    fish,bora,flax oils-om3,6,9no1 400-400-400 mg Cap Take by mouth.    glucosamine-chondroitin 500-400 mg tablet 2 tablets, Daily    loratadine (CLARITIN) 10 mg tablet No dose, route, or frequency recorded.    multivitamin (THERAGRAN) per tablet 1 tablet, Daily    PNEUMOVAX-23 25 mcg/0.5 mL vaccine No dose, route, or frequency recorded.    prednisolon/gatiflox/bromfenac (PREDNISOL ACE-GATIFLOX-BROMFEN) 1-0.5-0.075 % DrpS 1 drop, Ophthalmic, 3 times daily, in operative eye for 1 month after surgery    rosuvastatin (CRESTOR) 5 mg, Oral, Daily    RSVPreF3 antigen-AS01E, PF, (AREXVY) 120 mcg/0.5 mL SusR vaccine Inject 0.5mL into the muscle once     Objective:      Vitals:    02/19/25 0814   BP: 98/60   Pulse: 61   SpO2: 95%   Weight: 71.8 kg (158 lb 4.6 oz)   Height: 5' 10" (1.778 m)   PainSc:   8   PainLoc: Generalized     Body mass index is 22.71 kg/m².    Physical Exam  Constitutional:       General: He is not in acute distress.     Appearance: Normal appearance. He is normal weight. He is not ill-appearing, toxic-appearing or diaphoretic.   HENT:      Head: Normocephalic and atraumatic.   Eyes:      Extraocular Movements: Extraocular movements intact.      Conjunctiva/sclera: Conjunctivae normal.   Cardiovascular:      Rate and Rhythm: Normal rate and regular rhythm.      Pulses: Normal pulses.      Heart sounds: Normal heart sounds. No murmur heard.     No friction rub. No gallop.   Pulmonary:      " Effort: Pulmonary effort is normal. No respiratory distress.      Breath sounds: Normal breath sounds. No stridor. No wheezing, rhonchi or rales.   Chest:      Chest wall: Tenderness present.   Musculoskeletal:      Cervical back: Normal range of motion and neck supple. No rigidity.      Right lower leg: No edema.      Left lower leg: No edema.      Comments: Cut on dorsum of MCP joint of second digit, right hand. Swelling with erythema noted. Good ROM without pain, nontender to palpation, no purulent discharge noted. Rest of arm without lesions, redness, or swelling.     No lymphadenopathy appreciated.     Tenderness to palpation on anterior portion of right axilla on edge of pectoral muscles.   Significant erythema noted from clavicle to inguinal region on right side of torso. Tender to palpation primarily over right pectoralis and slightly on lateral aspect of abdomen. Skin was hot to touch compared to left. Picture attached.    Lymphadenopathy:      Cervical: No cervical adenopathy.   Skin:     General: Skin is warm.      Capillary Refill: Capillary refill takes less than 2 seconds.      Findings: Erythema and lesion present.   Neurological:      General: No focal deficit present.      Mental Status: He is alert and oriented to person, place, and time. Mental status is at baseline.   Psychiatric:         Mood and Affect: Mood normal.         Behavior: Behavior normal.         Thought Content: Thought content normal.         Judgment: Judgment normal.               Assessment:       1. Cellulitis, unspecified cellulitis site        IMPRESSION:  - Suspect localized infection progressing to possible systemic infection, likely introduced through hand laceration acquired in unsanitary environment  - Concerned about progression of symptoms (expanding redness, pain) despite antibiotic treatment  - Noted low blood pressure as potential sign of systemic involvement  - Considering need for IV antibiotics and further  workup vs. outpatient management    Plan:   Cellulitis, unspecified cellulitis site  - Concern for systemic infection despite oral antibiotic therapy. Reviewed previous notes and imaging/lab results. Discussed case with [facility] doctor and they agreed with recommendation for referral to ED for possible systemic IV antibiotics. Patient expressed understanding of possibility of systemic infection and agreeable to go to ED. Walked patient with MA to ED.   - Other diagnoses considered include antibiotic adverse reaction, but patient has never had this reaction before.   -     Refer to Emergency Dept.        This note was generated with the assistance of ambient listening technology. Verbal consent was obtained by the patient and accompanying visitor(s) for the recording of patient appointment to facilitate this note. I attest to having reviewed and edited the generated note for accuracy, though some syntax or spelling errors may persist. Please contact the author of this note for any clarification.    Tomer Bejarano PA-C    2/19/2025

## 2025-02-20 ENCOUNTER — PATIENT OUTREACH (OUTPATIENT)
Facility: OTHER | Age: 70
End: 2025-02-20
Payer: MEDICARE

## 2025-02-20 PROBLEM — E83.39 HYPOPHOSPHATEMIA: Status: ACTIVE | Noted: 2025-02-20

## 2025-02-20 LAB
ANION GAP SERPL CALC-SCNC: 8 MMOL/L (ref 8–16)
ANISOCYTOSIS BLD QL SMEAR: SLIGHT
BASOPHILS # BLD AUTO: 0.02 K/UL (ref 0–0.2)
BASOPHILS NFR BLD: 0.3 % (ref 0–1.9)
BUN SERPL-MCNC: 20 MG/DL (ref 8–23)
BURR CELLS BLD QL SMEAR: ABNORMAL
CALCIUM SERPL-MCNC: 8 MG/DL (ref 8.7–10.5)
CHLORIDE SERPL-SCNC: 104 MMOL/L (ref 95–110)
CO2 SERPL-SCNC: 20 MMOL/L (ref 23–29)
CREAT SERPL-MCNC: 0.9 MG/DL (ref 0.5–1.4)
DIFFERENTIAL METHOD BLD: ABNORMAL
EOSINOPHIL # BLD AUTO: 0.2 K/UL (ref 0–0.5)
EOSINOPHIL NFR BLD: 3.3 % (ref 0–8)
ERYTHROCYTE [DISTWIDTH] IN BLOOD BY AUTOMATED COUNT: 12.6 % (ref 11.5–14.5)
EST. GFR  (NO RACE VARIABLE): >60 ML/MIN/1.73 M^2
GLUCOSE SERPL-MCNC: 99 MG/DL (ref 70–110)
HCT VFR BLD AUTO: 30.5 % (ref 40–54)
HGB BLD-MCNC: 10.8 G/DL (ref 14–18)
IMM GRANULOCYTES # BLD AUTO: 0.02 K/UL (ref 0–0.04)
IMM GRANULOCYTES NFR BLD AUTO: 0.3 % (ref 0–0.5)
LYMPHOCYTES # BLD AUTO: 0.3 K/UL (ref 1–4.8)
LYMPHOCYTES NFR BLD: 3.7 % (ref 18–48)
MAGNESIUM SERPL-MCNC: 1.9 MG/DL (ref 1.6–2.6)
MCH RBC QN AUTO: 32.4 PG (ref 27–31)
MCHC RBC AUTO-ENTMCNC: 35.4 G/DL (ref 32–36)
MCV RBC AUTO: 92 FL (ref 82–98)
MONOCYTES # BLD AUTO: 0.2 K/UL (ref 0.3–1)
MONOCYTES NFR BLD: 3.4 % (ref 4–15)
NEUTROPHILS # BLD AUTO: 6 K/UL (ref 1.8–7.7)
NEUTROPHILS NFR BLD: 89 % (ref 38–73)
NRBC BLD-RTO: 0 /100 WBC
OSMOLALITY SERPL: 279 MOSM/KG (ref 280–300)
PHOSPHATE SERPL-MCNC: 1.5 MG/DL (ref 2.7–4.5)
PLATELET # BLD AUTO: 130 K/UL (ref 150–450)
PLATELET BLD QL SMEAR: ABNORMAL
PMV BLD AUTO: 10.7 FL (ref 9.2–12.9)
POIKILOCYTOSIS BLD QL SMEAR: SLIGHT
POTASSIUM SERPL-SCNC: 3.7 MMOL/L (ref 3.5–5.1)
RBC # BLD AUTO: 3.33 M/UL (ref 4.6–6.2)
SODIUM SERPL-SCNC: 132 MMOL/L (ref 136–145)
WBC # BLD AUTO: 6.69 K/UL (ref 3.9–12.7)

## 2025-02-20 PROCEDURE — 36415 COLL VENOUS BLD VENIPUNCTURE: CPT | Mod: HCNC | Performed by: HOSPITALIST

## 2025-02-20 PROCEDURE — 80048 BASIC METABOLIC PNL TOTAL CA: CPT | Mod: HCNC | Performed by: HOSPITALIST

## 2025-02-20 PROCEDURE — 94761 N-INVAS EAR/PLS OXIMETRY MLT: CPT | Mod: HCNC

## 2025-02-20 PROCEDURE — 96366 THER/PROPH/DIAG IV INF ADDON: CPT

## 2025-02-20 PROCEDURE — 21400001 HC TELEMETRY ROOM: Mod: HCNC

## 2025-02-20 PROCEDURE — 25000003 PHARM REV CODE 250: Mod: HCNC | Performed by: HOSPITALIST

## 2025-02-20 PROCEDURE — 96376 TX/PRO/DX INJ SAME DRUG ADON: CPT

## 2025-02-20 PROCEDURE — 63600175 PHARM REV CODE 636 W HCPCS: Mod: HCNC | Performed by: HOSPITALIST

## 2025-02-20 PROCEDURE — 96361 HYDRATE IV INFUSION ADD-ON: CPT

## 2025-02-20 PROCEDURE — 85025 COMPLETE CBC W/AUTO DIFF WBC: CPT | Mod: HCNC | Performed by: HOSPITALIST

## 2025-02-20 PROCEDURE — 96367 TX/PROPH/DG ADDL SEQ IV INF: CPT

## 2025-02-20 PROCEDURE — 84100 ASSAY OF PHOSPHORUS: CPT | Mod: HCNC | Performed by: HOSPITALIST

## 2025-02-20 PROCEDURE — 83930 ASSAY OF BLOOD OSMOLALITY: CPT | Mod: HCNC | Performed by: HOSPITALIST

## 2025-02-20 PROCEDURE — 83735 ASSAY OF MAGNESIUM: CPT | Mod: HCNC | Performed by: HOSPITALIST

## 2025-02-20 RX ADMIN — ACETAMINOPHEN 1000 MG: 500 TABLET, FILM COATED ORAL at 06:02

## 2025-02-20 RX ADMIN — DIPHENHYDRAMINE HYDROCHLORIDE 25 MG: 50 INJECTION, SOLUTION INTRAMUSCULAR; INTRAVENOUS at 06:02

## 2025-02-20 RX ADMIN — CETIRIZINE HYDROCHLORIDE 10 MG: 10 TABLET, FILM COATED ORAL at 08:02

## 2025-02-20 RX ADMIN — POTASSIUM PHOSPHATE, MONOBASIC POTASSIUM PHOSPHATE, DIBASIC 15 MMOL: 224; 236 INJECTION, SOLUTION, CONCENTRATE INTRAVENOUS at 08:02

## 2025-02-20 RX ADMIN — SODIUM CHLORIDE: 9 INJECTION, SOLUTION INTRAVENOUS at 01:02

## 2025-02-20 RX ADMIN — SODIUM CHLORIDE: 9 INJECTION, SOLUTION INTRAVENOUS at 10:02

## 2025-02-20 RX ADMIN — PIPERACILLIN SODIUM AND TAZOBACTAM SODIUM 4.5 G: 4; .5 INJECTION, POWDER, FOR SOLUTION INTRAVENOUS at 02:02

## 2025-02-20 RX ADMIN — VANCOMYCIN HYDROCHLORIDE 1500 MG: 1.5 INJECTION, POWDER, LYOPHILIZED, FOR SOLUTION INTRAVENOUS at 10:02

## 2025-02-20 RX ADMIN — ATORVASTATIN CALCIUM 20 MG: 20 TABLET, FILM COATED ORAL at 08:02

## 2025-02-20 RX ADMIN — PIPERACILLIN SODIUM AND TAZOBACTAM SODIUM 4.5 G: 4; .5 INJECTION, POWDER, FOR SOLUTION INTRAVENOUS at 10:02

## 2025-02-20 RX ADMIN — ENOXAPARIN SODIUM 40 MG: 40 INJECTION SUBCUTANEOUS at 05:02

## 2025-02-20 RX ADMIN — PIPERACILLIN SODIUM AND TAZOBACTAM SODIUM 4.5 G: 4; .5 INJECTION, POWDER, FOR SOLUTION INTRAVENOUS at 05:02

## 2025-02-20 NOTE — PROGRESS NOTES
Patient seen at Primary care on 2/19/25 for c/o cellulitis. Charlie Bejarano PA-C consult [facility]. [facility] Provider Dr Diaz disposition recommendation pt to go to ED.  Patient currently admitted to [facility].

## 2025-02-20 NOTE — NURSING
Per tele monitor tech, pt had a couple of 2 sec pauses and hr dipped down to the 34. VSS. Pt asymptomatic. Dr Iglesias notified via secure chat. No new orders rec'd.

## 2025-02-20 NOTE — PLAN OF CARE
[facility] Message     Medicare Outpatient and Observation Notification regarding financial responsibility Explained to patient/caregiver; Signed/date by patient/caregiver   Date GEORGE was signed 2/20/2025   Time GEORGE was signed 1046

## 2025-02-20 NOTE — SUBJECTIVE & OBJECTIVE
Interval History:  Some regression of the erythema and improvement in tenderness involving the right axilla and right chest wall.  Serum sodium trending up.  Blood cultures no growth today.    Review of Systems   Constitutional:  Negative for chills and fever.   Respiratory:  Negative for shortness of breath.    Cardiovascular:  Negative for chest pain.   Gastrointestinal:  Negative for abdominal pain, constipation, diarrhea, nausea and vomiting.     Objective:     Vital Signs (Most Recent):  Temp: 97.9 °F (36.6 °C) (02/20/25 0802)  Pulse: (!) 56 (02/20/25 0802)  Resp: 16 (02/20/25 0802)  BP: (!) 96/56 (02/20/25 0802)  SpO2: 96 % (02/20/25 0802) Vital Signs (24h Range):  Temp:  [97.7 °F (36.5 °C)-98.5 °F (36.9 °C)] 97.9 °F (36.6 °C)  Pulse:  [56-67] 56  Resp:  [11-20] 16  SpO2:  [95 %-100 %] 96 %  BP: ()/(54-67) 96/56     Weight: 71.7 kg (158 lb)  Body mass index is 23.33 kg/m².    Intake/Output Summary (Last 24 hours) at 2/20/2025 0922  Last data filed at 2/20/2025 0632  Gross per 24 hour   Intake 3063.85 ml   Output 3665 ml   Net -601.15 ml         Physical Exam  Cardiovascular:      Rate and Rhythm: Normal rate and regular rhythm.      Heart sounds: Normal heart sounds. No murmur heard.     No friction rub. No gallop.   Pulmonary:      Effort: Pulmonary effort is normal. No respiratory distress.      Breath sounds: Normal breath sounds. No wheezing or rales.   Abdominal:      General: Bowel sounds are normal. There is no distension.      Palpations: Abdomen is soft.      Tenderness: There is no abdominal tenderness.   Musculoskeletal:         General: No tenderness. Normal range of motion.   Skin:     General: Skin is warm and dry.      Coloration: Skin is not pale.      Findings: Erythema present. No rash.      Comments: Some regression area of erythema involving the right chest wall.  Do not appreciate any fluctuance.   Neurological:      Mental Status: He is alert and oriented to person, place, and time.              Significant Labs: All pertinent labs within the past 24 hours have been reviewed.    Significant Imaging: I have reviewed all pertinent imaging results/findings within the past 24 hours.

## 2025-02-20 NOTE — ASSESSMENT & PLAN NOTE
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  Patient reports worsening hyponatremia now.  Clinically and laboratory studies support hypotonic hypovolemic hyponatremia.  Clinically improving with volume resuscitation with isotonic normal saline.  We will continue.  TSH normal.

## 2025-02-20 NOTE — PLAN OF CARE
VSS and afebrile, Ox4. No significant events during shift. Continuous fluids infusing. Antibiotics infused per order. Spouse at bedside, supportive of patient. Purposeful rounding done, call light at bed side, bed at lowest position, brakes on, non-skid socks on, will continue to monitor.     Problem: Adult Inpatient Plan of Care  Goal: Plan of Care Review  Outcome: Progressing  Goal: Optimal Comfort and Wellbeing  Outcome: Progressing     Problem: Sepsis/Septic Shock  Goal: Optimal Coping  Outcome: Progressing  Goal: Absence of Infection Signs and Symptoms  Outcome: Progressing  Goal: Optimal Nutrition Intake  Outcome: Progressing     Problem: Pain Acute  Goal: Optimal Pain Control and Function  Outcome: Progressing

## 2025-02-20 NOTE — HOSPITAL COURSE
Patient is a 69 year-old man admitted to the hospital with evidence of worsening cellulitis despite oral antibiotics with cephalexin.  Erythema and tenderness involved right axilla and right chest wall.  Patient also noted to be hyponatremia.  History of prior hyponatremia however evaluation here revealed hyponatremia secondary to hypovolemic hypotonic hyponatremia at this time with low urine sodium and hypotension.     Patient started on broad-spectrum intravenous antibiotics and volume resuscitated intravenous fluids.  Cellulitis improved.  Serum sodium trending to normal.  Patient developed some induration involving the upper right chest wall.  Ultrasound performed did not show drainable fluid collection.    Infectious disease service consulted recommended switching to intravenous vancomycin and ceftriaxone.  Patient cellulitis continued to improve.  Blood cultures remain no growth to date.  Patient clinically stable to discharge home complete a course of empiric oral antibiotics to target likely skin soft tissue infection organisms.    Patient advised to monitor for further improvement and resolution of his infection.  Patient advised to return to the hospital in the event of fever or worsening redness, swollen this or pain.    Patient noted to have mild laboratory abnormalities including normocytic anemia.  Patient did not manifest any evidence of her bleeding.  Suspect anemia likely related to initial hemoconcentration on presentation along with degree of iatrogenic anemia from phlebotomy to monitor his laboratory studies during this hospitalization.    Patient also noted to have mild elevation in alkaline phosphatase.  Mild elevation in liver enzymes however patient recently started on statin therapy unlikely related to statin therapy.  Patient also developed mild thrombocytopenia which recovered with improvement of his infection.  Suspect on standing and abnormal lab findings will likely resolve with  resolution of his infection.  However recommend repeat laboratory studies a few weeks with his primary care physician to ensure resolution of laboratory abnormalities.

## 2025-02-20 NOTE — ASSESSMENT & PLAN NOTE
Patient presents with subjective fevers, chills, and worsening erythema and tenderness involving right axillae and right chest wall concerning for worsening cellulitis with treatment failure with oral antibiotics.  Mildly hypotensive but not in shock.  White count normal range but with neutrophil prominence consistent with bacteria infection.  Area of initial injury to right hand seems to be improved but likely source of infection.    Allergic reaction to cefalexin also possible but less likely as I would expect to see more of a symmetric skin reaction with allergy.  Also without any appreciable improvement with diphenhydramine.    Patient with some regression of area of erythema on broad-spectrum antibiotics.  Subjectively feeling better today.  Blood cultures no growth today but early.  Continue to cover broad-spectrum antibiotics pending blood culture results and further response to treatment.    Drop in hemoglobin likely due to hemoconcentration on presentation.  Mild thrombocytopenia likely due to sepsis.  Continue to monitor blood counts.    If clinically worsens would have low threshold to check CT chest to evaluate for deeper source of infection.  We will also ask Infectious Disease service to evaluate.

## 2025-02-20 NOTE — PLAN OF CARE
Case Management Assessment     PCP: Joseph Santana J., MD  Pharmacy: [facility]    Patient Arrived From: Home  Existing Help at Home: Independent    Barriers to Discharge: None    Discharge Plan:    A. Home with family   B. Home    A&O x 4; PCP correct on chart; Independent with ADL's; Does not own/need DME; family will provide transport at discharge.      [facility] ([address])  Initial Discharge Assessment       Primary Care Provider: Joseph Santana MD    Admission Diagnosis: Cellulitis of abdominal wall [L03.311]  Cellulitis of chest wall [L03.313]  Screening for cardiovascular condition [Z13.6]  Sepsis [A41.9]    Admission Date: 2/19/2025  Expected Discharge Date:     Transition of Care Barriers: (P) None    Payor: [employer] MEDICARE / Plan: [employer] HMO PPO SPECIAL NEEDS / Product Type: Medicare Advantage /     Extended Emergency Contact Information  Primary Emergency Contact: Sabrina Holloway  Address: [address] [place] [address]  Home Phone: [phone]  Mobile Phone: [phone]  Relation: Spouse    Discharge Plan A: (P) Home with family  Discharge Plan B: (P) Home      [id number] - [place], [place] - [address] AT [address] [facility] [address] [facility]  [address] [facility] [address]  Phone: [phone] Fax: [phone]    [facility] [address] [facility]  [address].  [place] [address]  Phone: [phone] Fax: [phone]    [facility]  [address] [place] [address]  Phone: [phone] Fax: [phone]      Initial Assessment (most recent)       Adult Discharge Assessment - 02/20/25 0900          Discharge Assessment    Assessment Type Discharge Planning Assessment     Confirmed/corrected address, phone number and insurance Yes     Confirmed Demographics Correct on Facesheet     Source of  Information patient;family     Does patient/caregiver understand observation status Yes (P)      Communicated QUITA with patient/caregiver Yes     Reason For Admission Sepsis     People in Home spouse     Facility Arrived From: Home (P)      Do you expect to return to your current living situation? Yes (P)      Do you have help at home or someone to help you manage your care at home? Yes (P)      Who are your caregiver(s) and their phone number(s)? Sabrina Holloway (Spouse)  [phone] (Mobile) (P)      Prior to hospitilization cognitive status: Alert/Oriented (P)      Current cognitive status: Alert/Oriented (P)      Walking or Climbing Stairs Difficulty no (P)      Dressing/Bathing Difficulty no (P)      Equipment Currently Used at Home none (P)      Readmission within 30 days? No (P)      Patient currently being followed by outpatient case management? No (P)      Do you currently have service(s) that help you manage your care at home? No (P)      Do you take prescription medications? Yes (P)      Do you have prescription coverage? Yes (P)      Coverage HUMANA MANAGED MEDICARE - [facility] SPECIAL NEEDS (P)      Do you have any problems affording any of your prescribed medications? No (P)      Who is going to help you get home at discharge? Sabrina Holloway (Spouse)  [phone] (Mobile) (P)      How do you get to doctors appointments? car, drives self (P)      Are you on dialysis? No (P)      Do you take coumadin? No (P)      Discharge Plan A Home with family (P)      Discharge Plan B Home (P)      DME Needed Upon Discharge  none (P)      Discharge Plan discussed with: Spouse/sig other;Patient (P)      Name(s) and Number(s) Sabrina Holloway (Spouse)  [phone] (Mobile) (P)      Transition of Care Barriers None (P)

## 2025-02-20 NOTE — PROGRESS NOTES
[facility] [address] Medicine  Progress Note    Patient Name: Hugh Bates  MRN: [id number]  Patient Class: IP- Inpatient   Admission Date: 2025  Length of Stay: 0 days  Attending Physician: Johny Iglesias MD  Primary Care Provider: Joseph Santana MD          Principal Problem:Sepsis        HPI:  Patient is a 69 year-old man with history of dyslipidemia who reports suffering an abrasion to his right hand while cleaning her late mother's house in [facility] this last Friday on 2024.  He reports bleeding from his abrasion and he placed a Band-Aid with resolution of bleeding.  He reports developed diffuse muscle pains on Saturday/ which he initially attributed to recent exercise along with statin use.  However he reports he developed worsening right arm pain with chills and low grade fever.  Patient evaluated in the emergency department and prescribed cephalexin to treat cellulitis.  Patient reports taking 4 doses of cephalexin without improvement and noted worsening pain in his right axilla and redness that extended to his right chest wall and spread down the right side towards his waistline.  He denies any redness elsewhere.    He denies any known drug allergies.  He takes loratadine for allergic sinusitis. Reports he [place] and maybe touched the water with his right hand but otherwise denies any other recent exposures.  He reports not any any raw oysters or other uncooked food. No recent sick contacts. No wheezing or shortness of breath or cough. No nausea, vomiting, diarrhea, or abdominal pain.     He reports non-tobacco smoker. No history of substance abuse.  He reports that his father  of lung cancer at 43 years of age but otherwise denies any other family history.  He reports he is mostly retried .    Overview/Hospital Course:  Patient is a 69 year-old man admitted to the hospital with evidence of  worsening cellulitis despite oral antibiotics with cephalexin.  Erythema and tenderness involves right axilla and right chest wall.  Patient also noted to be hyponatremia.  History of prior hyponatremia however appears to be hypovolemic hyponatremia at this time with low urine sodium and hypotension.  Clinically improving with broad-spectrum intravenous antibiotics and volume resuscitation with intravenous fluids.    Interval History:  Some regression of the erythema and improvement in tenderness involving the right axilla and right chest wall.  Serum sodium trending up.  Blood cultures no growth today.    Review of Systems   Constitutional:  Negative for chills and fever.   Respiratory:  Negative for shortness of breath.    Cardiovascular:  Negative for chest pain.   Gastrointestinal:  Negative for abdominal pain, constipation, diarrhea, nausea and vomiting.     Objective:     Vital Signs (Most Recent):  Temp: 97.9 °F (36.6 °C) (02/20/25 0802)  Pulse: (!) 56 (02/20/25 0802)  Resp: 16 (02/20/25 0802)  BP: (!) 96/56 (02/20/25 0802)  SpO2: 96 % (02/20/25 0802) Vital Signs (24h Range):  Temp:  [97.7 °F (36.5 °C)-98.5 °F (36.9 °C)] 97.9 °F (36.6 °C)  Pulse:  [56-67] 56  Resp:  [11-20] 16  SpO2:  [95 %-100 %] 96 %  BP: ()/(54-67) 96/56     Weight: 71.7 kg (158 lb)  Body mass index is 23.33 kg/m².    Intake/Output Summary (Last 24 hours) at 2/20/2025 0922  Last data filed at 2/20/2025 0632  Gross per 24 hour   Intake 3063.85 ml   Output 3665 ml   Net -601.15 ml         Physical Exam  Cardiovascular:      Rate and Rhythm: Normal rate and regular rhythm.      Heart sounds: Normal heart sounds. No murmur heard.     No friction rub. No gallop.   Pulmonary:      Effort: Pulmonary effort is normal. No respiratory distress.      Breath sounds: Normal breath sounds. No wheezing or rales.   Abdominal:      General: Bowel sounds are normal. There is no distension.      Palpations: Abdomen is soft.      Tenderness: There is no  abdominal tenderness.   Musculoskeletal:         General: No tenderness. Normal range of motion.   Skin:     General: Skin is warm and dry.      Coloration: Skin is not pale.      Findings: Erythema present. No rash.      Comments: Some regression area of erythema involving the right chest wall.  Do not appreciate any fluctuance.   Neurological:      Mental Status: He is alert and oriented to person, place, and time.             Significant Labs: All pertinent labs within the past 24 hours have been reviewed.    Significant Imaging: I have reviewed all pertinent imaging results/findings within the past 24 hours.    Assessment and Plan     * Sepsis  Patient presents with subjective fevers, chills, and worsening erythema and tenderness involving right axillae and right chest wall concerning for worsening cellulitis with treatment failure with oral antibiotics.  Mildly hypotensive but not in shock.  White count normal range but with neutrophil prominence consistent with bacteria infection.  Area of initial injury to right hand seems to be improved but likely source of infection.    Allergic reaction to cefalexin also possible but less likely as I would expect to see more of a symmetric skin reaction with allergy.  Also without any appreciable improvement with diphenhydramine.    Patient with some regression of area of erythema on broad-spectrum antibiotics.  Subjectively feeling better today.  Blood cultures no growth today but early.  Continue to cover broad-spectrum antibiotics pending blood culture results and further response to treatment.    Drop in hemoglobin likely due to hemoconcentration on presentation.  Mild thrombocytopenia likely due to sepsis.  Continue to monitor blood counts.    If clinically worsens would have low threshold to check CT chest to evaluate for deeper source of infection.  We will also ask Infectious Disease service to evaluate.    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  Patient reports worsening hyponatremia now.  Clinically and laboratory studies support hypotonic hypovolemic hyponatremia.  Clinically improving with volume resuscitation with isotonic normal saline.  We will continue.  TSH normal.    Hypophosphatemia  Will correct hypophosphatemia and encourage intake.  Repeat electrolytes tomorrow.    Dyslipidemia  Continue statin therapy.      VTE Risk Mitigation (From admission, onward)           Ordered     enoxaparin injection 40 mg  Daily         02/19/25 1237     IP VTE HIGH RISK PATIENT  Once         02/19/25 1237     Place sequential compression device  Until discontinued         02/19/25 1237                    Johny Iglesias MD  Department of Hospital Medicine   [facility] ([address])

## 2025-02-20 NOTE — PROGRESS NOTES
Active pharmacy to dose vancomycin consult:     Patient reviewed, renal function stable, cultures reviewed, no new levels, continue current therapy; Next levels due: 2/21 at 10:00    Please contact inpatient pharmacy at [phone] with any questions.     Thank you,  Hedy Dougherty  02/20/2025

## 2025-02-21 PROBLEM — L03.313 CELLULITIS OF CHEST WALL: Status: ACTIVE | Noted: 2025-02-21

## 2025-02-21 LAB
ALBUMIN SERPL BCP-MCNC: 2.2 G/DL (ref 3.5–5.2)
ALP SERPL-CCNC: 182 U/L (ref 40–150)
ALT SERPL W/O P-5'-P-CCNC: 54 U/L (ref 10–44)
ANION GAP SERPL CALC-SCNC: 5 MMOL/L (ref 8–16)
AST SERPL-CCNC: 47 U/L (ref 10–40)
BASOPHILS # BLD AUTO: 0.01 K/UL (ref 0–0.2)
BASOPHILS NFR BLD: 0.2 % (ref 0–1.9)
BILIRUB SERPL-MCNC: 0.5 MG/DL (ref 0.1–1)
BUN SERPL-MCNC: 14 MG/DL (ref 8–23)
CA-I BLDV-SCNC: 1.14 MMOL/L (ref 1.06–1.42)
CALCIUM SERPL-MCNC: 8.3 MG/DL (ref 8.7–10.5)
CHLORIDE SERPL-SCNC: 107 MMOL/L (ref 95–110)
CO2 SERPL-SCNC: 22 MMOL/L (ref 23–29)
CREAT SERPL-MCNC: 0.9 MG/DL (ref 0.5–1.4)
DIFFERENTIAL METHOD BLD: ABNORMAL
EOSINOPHIL # BLD AUTO: 0.2 K/UL (ref 0–0.5)
EOSINOPHIL NFR BLD: 2.7 % (ref 0–8)
ERYTHROCYTE [DISTWIDTH] IN BLOOD BY AUTOMATED COUNT: 12.8 % (ref 11.5–14.5)
EST. GFR  (NO RACE VARIABLE): >60 ML/MIN/1.73 M^2
FIBRINOGEN PPP-MCNC: 699 MG/DL (ref 182–400)
GLUCOSE SERPL-MCNC: 96 MG/DL (ref 70–110)
HCT VFR BLD AUTO: 30.6 % (ref 40–54)
HGB BLD-MCNC: 10.6 G/DL (ref 14–18)
IMM GRANULOCYTES # BLD AUTO: 0.03 K/UL (ref 0–0.04)
IMM GRANULOCYTES NFR BLD AUTO: 0.5 % (ref 0–0.5)
LACTATE SERPL-SCNC: 0.7 MMOL/L (ref 0.5–2.2)
LYMPHOCYTES # BLD AUTO: 0.5 K/UL (ref 1–4.8)
LYMPHOCYTES NFR BLD: 7.8 % (ref 18–48)
MAGNESIUM SERPL-MCNC: 1.9 MG/DL (ref 1.6–2.6)
MCH RBC QN AUTO: 32.1 PG (ref 27–31)
MCHC RBC AUTO-ENTMCNC: 34.6 G/DL (ref 32–36)
MCV RBC AUTO: 93 FL (ref 82–98)
MONOCYTES # BLD AUTO: 0.5 K/UL (ref 0.3–1)
MONOCYTES NFR BLD: 7.9 % (ref 4–15)
NEUTROPHILS # BLD AUTO: 5.1 K/UL (ref 1.8–7.7)
NEUTROPHILS NFR BLD: 80.9 % (ref 38–73)
NRBC BLD-RTO: 0 /100 WBC
OSMOLALITY SERPL: 279 MOSM/KG (ref 280–300)
PHOSPHATE SERPL-MCNC: 2.2 MG/DL (ref 2.7–4.5)
PLATELET # BLD AUTO: 137 K/UL (ref 150–450)
PMV BLD AUTO: 10.4 FL (ref 9.2–12.9)
POTASSIUM SERPL-SCNC: 3.8 MMOL/L (ref 3.5–5.1)
PROT SERPL-MCNC: 5.2 G/DL (ref 6–8.4)
RBC # BLD AUTO: 3.3 M/UL (ref 4.6–6.2)
SODIUM SERPL-SCNC: 134 MMOL/L (ref 136–145)
VANCOMYCIN TROUGH SERPL-MCNC: 4.6 UG/ML (ref 10–22)
WBC # BLD AUTO: 6.29 K/UL (ref 3.9–12.7)

## 2025-02-21 PROCEDURE — 83735 ASSAY OF MAGNESIUM: CPT | Mod: HCNC | Performed by: HOSPITALIST

## 2025-02-21 PROCEDURE — 83930 ASSAY OF BLOOD OSMOLALITY: CPT | Mod: HCNC | Performed by: HOSPITALIST

## 2025-02-21 PROCEDURE — 63600175 PHARM REV CODE 636 W HCPCS: Mod: HCNC | Performed by: INTERNAL MEDICINE

## 2025-02-21 PROCEDURE — 85025 COMPLETE CBC W/AUTO DIFF WBC: CPT | Mod: HCNC | Performed by: HOSPITALIST

## 2025-02-21 PROCEDURE — 80202 ASSAY OF VANCOMYCIN: CPT | Mod: HCNC | Performed by: HOSPITALIST

## 2025-02-21 PROCEDURE — 99223 1ST HOSP IP/OBS HIGH 75: CPT | Mod: HCNC,,, | Performed by: INTERNAL MEDICINE

## 2025-02-21 PROCEDURE — 25000003 PHARM REV CODE 250: Mod: HCNC | Performed by: HOSPITALIST

## 2025-02-21 PROCEDURE — 84100 ASSAY OF PHOSPHORUS: CPT | Mod: HCNC | Performed by: HOSPITALIST

## 2025-02-21 PROCEDURE — 21400001 HC TELEMETRY ROOM: Mod: HCNC

## 2025-02-21 PROCEDURE — 63600175 PHARM REV CODE 636 W HCPCS: Mod: HCNC | Performed by: HOSPITALIST

## 2025-02-21 PROCEDURE — G0545 PR VISIT INHERENT TO INPT OR OBS CARE, INFECTIOUS DISEASE: HCPCS | Mod: HCNC,,, | Performed by: INTERNAL MEDICINE

## 2025-02-21 PROCEDURE — 99222 1ST HOSP IP/OBS MODERATE 55: CPT | Mod: HCNC,,, | Performed by: INTERNAL MEDICINE

## 2025-02-21 PROCEDURE — 80053 COMPREHEN METABOLIC PANEL: CPT | Mod: HCNC | Performed by: HOSPITALIST

## 2025-02-21 PROCEDURE — 83605 ASSAY OF LACTIC ACID: CPT | Mod: HCNC | Performed by: HOSPITALIST

## 2025-02-21 PROCEDURE — 85384 FIBRINOGEN ACTIVITY: CPT | Mod: HCNC | Performed by: HOSPITALIST

## 2025-02-21 PROCEDURE — 82330 ASSAY OF CALCIUM: CPT | Mod: HCNC | Performed by: HOSPITALIST

## 2025-02-21 PROCEDURE — 36415 COLL VENOUS BLD VENIPUNCTURE: CPT | Mod: HCNC | Performed by: HOSPITALIST

## 2025-02-21 RX ORDER — CEFTRIAXONE 2 G/1
2 INJECTION, POWDER, FOR SOLUTION INTRAMUSCULAR; INTRAVENOUS
Status: DISCONTINUED | OUTPATIENT
Start: 2025-02-21 | End: 2025-02-23 | Stop reason: HOSPADM

## 2025-02-21 RX ORDER — VANCOMYCIN 1.75 GRAM/500 ML IN 0.9 % SODIUM CHLORIDE INTRAVENOUS
1750
Status: DISCONTINUED | OUTPATIENT
Start: 2025-02-21 | End: 2025-02-23 | Stop reason: HOSPADM

## 2025-02-21 RX ADMIN — CETIRIZINE HYDROCHLORIDE 10 MG: 10 TABLET, FILM COATED ORAL at 10:02

## 2025-02-21 RX ADMIN — VANCOMYCIN HYDROCHLORIDE 1750 MG: 500 INJECTION, POWDER, LYOPHILIZED, FOR SOLUTION INTRAVENOUS at 09:02

## 2025-02-21 RX ADMIN — SODIUM CHLORIDE: 9 INJECTION, SOLUTION INTRAVENOUS at 06:02

## 2025-02-21 RX ADMIN — ENOXAPARIN SODIUM 40 MG: 40 INJECTION SUBCUTANEOUS at 04:02

## 2025-02-21 RX ADMIN — ACETAMINOPHEN 1000 MG: 500 TABLET, FILM COATED ORAL at 07:02

## 2025-02-21 RX ADMIN — CEFTRIAXONE SODIUM 2 G: 2 INJECTION, POWDER, FOR SOLUTION INTRAMUSCULAR; INTRAVENOUS at 01:02

## 2025-02-21 RX ADMIN — ATORVASTATIN CALCIUM 20 MG: 20 TABLET, FILM COATED ORAL at 10:02

## 2025-02-21 RX ADMIN — VANCOMYCIN HYDROCHLORIDE 1500 MG: 1.5 INJECTION, POWDER, LYOPHILIZED, FOR SOLUTION INTRAVENOUS at 10:02

## 2025-02-21 RX ADMIN — PIPERACILLIN SODIUM AND TAZOBACTAM SODIUM 4.5 G: 4; .5 INJECTION, POWDER, FOR SOLUTION INTRAVENOUS at 02:02

## 2025-02-21 NOTE — HPI
Mr. Bates is a 68y/o M pt with hx of HLD who presented with worsening R chest wall erythema and was admitted 2/19 for cellulitis.     Reports he was cleaning out his mom's old home on 2/14 and noticed he scraped his hand. The next day he went to the gym. On sat he walked on the beach in [place], [place] and picked up trash. Likely submerged his hand in the ocean. He also took a long bath. By Monday morning he was experiencing fevers and chills and pain at R axilla. Denied any erythema or drainage at site of original hand lesion. Went to ED 2/17 and was prescribed keflex. On Tues he noticed the right side of his chest and abdomen were red and warm.     In the ED he was afebrile. CXR clear. Started on empiric vanc and zosyn.       ID consulted for: Extensive right chest wall cellulitis following abrasion to the right hand

## 2025-02-21 NOTE — PLAN OF CARE
Medicare Message     Important Message from Medicare regarding Discharge Appeal Rights -- Explained to patient/caregiver; Signed/date by patient/caregiver   Date IMM was signed -- 2/20/2025   Time IMM was signed -- [id number]

## 2025-02-21 NOTE — ASSESSMENT & PLAN NOTE
68y/o M pt with hx of HLD who presented with worsening R chest wall erythema and was admitted  for cellulitis. Of note, he suffered a small laceration to R 2 finger knuckle on  while cleaning out his  mom's house. Proceeded to have R axilla pain 3 days later as well as fever and chills. Symptoms did not improve on keflex. Then presented with R chest erythema that is now improving on vanc and zosyn.    It is possible that R hand wound may have been the entry point for infection. Exposed wound to sea water while at the beach, so could have an atypical infection. However, is improving on current abx. Has new area of induration near R axilla concerning for LN vs abscess. No animal exposures.    Recommendations:  --agree with US and possible aspiration of area of induration- send for gram stain, aerobic, anaerobic cx  --ok to continue vanc pending cultures  --consider transitioning zosyn to ceftriaxone 2g iv q 24h  --monitor for continued improvement  --if no growth on cx will consider transitioning to doxy + cefpodoxime

## 2025-02-21 NOTE — SUBJECTIVE & OBJECTIVE
Interval History:  Further regression of the erythema.  Area of fluctuance noted to right upper chest wall however ultrasound negative for drainable fluid collection.    Blood cultures no growth to date.    Review of Systems   Constitutional:  Negative for chills and fever.   Respiratory:  Negative for shortness of breath.    Cardiovascular:  Negative for chest pain.   Gastrointestinal:  Negative for abdominal pain, constipation, diarrhea, nausea and vomiting.     Objective:     Vital Signs (Most Recent):  Temp: 98.2 °F (36.8 °C) (02/21/25 1343)  Pulse: 62 (02/21/25 1343)  Resp: 18 (02/21/25 1343)  BP: 105/60 (02/21/25 1343)  SpO2: 97 % (02/21/25 1343) Vital Signs (24h Range):  Temp:  [97.7 °F (36.5 °C)-98.8 °F (37.1 °C)] 98.2 °F (36.8 °C)  Pulse:  [38-66] 62  Resp:  [16-20] 18  SpO2:  [94 %-98 %] 97 %  BP: (100-128)/(60-70) 105/60     Weight: 71.7 kg (158 lb)  Body mass index is 23.33 kg/m².    Intake/Output Summary (Last 24 hours) at 2/21/2025 1411  Last data filed at 2/21/2025 0651  Gross per 24 hour   Intake 4168.03 ml   Output 2550 ml   Net 1618.03 ml         Physical Exam  Cardiovascular:      Rate and Rhythm: Normal rate and regular rhythm.      Heart sounds: Normal heart sounds. No murmur heard.     No friction rub. No gallop.   Pulmonary:      Effort: Pulmonary effort is normal. No respiratory distress.      Breath sounds: Normal breath sounds. No wheezing or rales.   Abdominal:      General: Bowel sounds are normal. There is no distension.      Palpations: Abdomen is soft.      Tenderness: There is no abdominal tenderness.   Musculoskeletal:         General: No tenderness. Normal range of motion.   Skin:     General: Skin is warm and dry.      Coloration: Skin is not pale.      Findings: Erythema present. No rash.      Comments: Further regression area of erythema involving the right chest wall.  Area of fluctuance of the right upper chest wall.   Neurological:      Mental Status: He is alert and oriented  to person, place, and time.             Significant Labs: All pertinent labs within the past 24 hours have been reviewed.    Significant Imaging: I have reviewed all pertinent imaging results/findings within the past 24 hours.

## 2025-02-21 NOTE — CONSULTS
[facility]    Date of admission:  2/19/2025     Reason for Consult:    Sinus pauses    HPI:    69-year-old male was admitted for cellulitis of the chest wall.  He denies prior cardiac problems or workup.  He reports being active without exertional dyspnea or chest discomfort.  Exercises regularly.  No syncope or presyncope.  No palpitations.  No change in his exercise capacity.  He was put on telemetry at admission.  This morning, pauses were noted leading to this cardiology consultation    Medications  Current Medications[1]   Prior to Admission medications    Medication Sig Start Date End Date Taking? Authorizing Provider   cephALEXin (KEFLEX) 250 MG capsule Take 1 capsule (250 mg total) by mouth every 12 (twelve) hours. for 7 days 2/17/25 2/24/25  Varinder Davis MD   collagen, hydrolysate, bovine, (COLLAGEN, HYDR, BOVINE,, BULK,) 100 % Powd  2/1/20   Provider, Historical   fish,bora,flax oils-om3,6,9no1 400-400-400 mg Cap Take by mouth.    Provider, Historical   glucosamine-chondroitin 500-400 mg tablet Take 2 tablets by mouth once daily.    Provider, Historical   loratadine (CLARITIN) 10 mg tablet  3/15/10   Provider, Historical   multivitamin (THERAGRAN) per tablet Take 1 tablet by mouth once daily.    Provider, Historical   PNEUMOVAX-23 25 mcg/0.5 mL vaccine  8/18/21   Provider, Historical   rosuvastatin (CRESTOR) 5 MG tablet Take 1 tablet (5 mg total) by mouth once daily. 11/7/24 11/7/25  Joseph Santana MD   RSVPreF3 antigen-AS01E, PF, (AREXVY) 120 mcg/0.5 mL SusR vaccine Inject 0.5mL into the muscle once 10/4/23   Gerri Zelaya, PharmD       History  Past Medical History:   Diagnosis Date    Basal cell carcinoma     Urinary tract infection      Past Surgical History:   Procedure Laterality Date    CATARACT EXTRACTION W/  INTRAOCULAR LENS IMPLANT Left 10/7/2024    Procedure: EXTRACTION, CATARACT, WITH IOL INSERTION;  Surgeon: Sepideh Cisneros MD;  Location: [facility] OR;  Service:  Ophthalmology;  Laterality: Left;  ORA, MC    CATARACT EXTRACTION W/  INTRAOCULAR LENS IMPLANT Right 10/28/2024    Procedure: EXTRACTION, CATARACT, WITH IOL INSERTION;  Surgeon: Sepideh Cisneros MD;  Location: [facility] OR;  Service: Ophthalmology;  Laterality: Right;  ANAIS ROA    COLONOSCOPY N/A 2016    Procedure: COLONOSCOPY;  Surgeon: George Virgen MD;  Location: [facility] (4TH FLR);  Service: Colon and Rectal;  Laterality: N/A;    COLONOSCOPY      FRACTURE SURGERY  1974    broken thumb    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          in     Early death Father Cristian Bates Jr.         was 44 in , [employer]    No Known Problems Sister      No Known Problems Sister      No Known Problems Sister      No Known Problems Brother      No Known Problems Maternal Aunt      No Known Problems Maternal Uncle      No Known Problems Paternal Aunt      No Known Problems Paternal Uncle      Arthritis Maternal Grandmother Michaela Wiseman     No Known Problems Maternal Grandfather      No Known Problems Paternal Grandmother      No Known Problems Paternal Grandfather      No Known Problems Other      Diabetes Neg Hx      Melanoma Neg Hx          Allergies  Review of patient's allergies indicates:  No Known Allergies    Review of Systems   Review of Systems   Constitutional: Negative for malaise/fatigue.   Cardiovascular:  Negative for chest pain, dyspnea on exertion, leg swelling, orthopnea and  paroxysmal nocturnal dyspnea.   Respiratory:  Negative for cough, shortness of breath and wheezing.    Hematologic/Lymphatic: Negative for bleeding problem.   Gastrointestinal:  Negative for constipation, nausea and vomiting.   Neurological:  Negative for dizziness.       Physical Exam    Temp:  [97.7 °F (36.5 °C)-98.1 °F (36.7 °C)]   Pulse:  [38-66]   Resp:  [17-18]   BP: (115-120)/(69-70)   SpO2:  [97 %-98 %]    Wt Readings from Last 1 Encounters:   02/19/25 71.7 kg (158 lb)     Physical Exam  Constitutional:       General: He is not in acute distress.  Neck:      Vascular: No hepatojugular reflux or JVD.   Cardiovascular:      Rate and Rhythm: Normal rate and regular rhythm.      Heart sounds: S1 normal and S2 normal. No murmur heard.     No S3 or S4 sounds.   Pulmonary:      Effort: Pulmonary effort is normal.      Breath sounds: Normal breath sounds and air entry.   Abdominal:      General: Bowel sounds are normal.      Palpations: Abdomen is soft. There is no hepatomegaly.      Tenderness: There is no abdominal tenderness.   Musculoskeletal:      Right lower leg: No edema.      Left lower leg: No edema.   Skin:     Coloration: Skin is not pale.   Neurological:      Mental Status: He is alert.   Psychiatric:         Behavior: Behavior is cooperative.         Telemetry  Sinus rhythm.  Nonconducted PACs with junctional escape and other times sinus pause    EKG  Ectopic atrial rhythm.  Otherwise normal.    Labs  Recent Results (from the past 72 hours)   Blood culture x two cultures. Draw prior to antibiotics.    Collection Time: 02/19/25  9:54 AM    Specimen: Peripheral, Antecubital, Right; Blood   Result Value Ref Range    Blood Culture, Routine No Growth to date     Blood Culture, Routine No Growth to date    CBC auto differential    Collection Time: 02/19/25  9:54 AM   Result Value Ref Range    WBC 7.90 3.90 - 12.70 K/uL    RBC 4.06 (L) 4.60 - 6.20 M/uL    Hemoglobin 13.1 (L) 14.0 - 18.0 g/dL    Hematocrit 37.5  (L) 40.0 - 54.0 %    MCV 92 82 - 98 fL    MCH 32.3 (H) 27.0 - 31.0 pg    MCHC 34.9 32.0 - 36.0 g/dL    RDW 12.5 11.5 - 14.5 %    Platelets 141 (L) 150 - 450 K/uL    MPV 10.5 9.2 - 12.9 fL    Immature Granulocytes 0.5 0.0 - 0.5 %    Gran # (ANC) 7.3 1.8 - 7.7 K/uL    Immature Grans (Abs) 0.04 0.00 - 0.04 K/uL    Lymph # 0.1 (L) 1.0 - 4.8 K/uL    Mono # 0.3 0.3 - 1.0 K/uL    Eos # 0.1 0.0 - 0.5 K/uL    Baso # 0.05 0.00 - 0.20 K/uL    nRBC 0 0 /100 WBC    Gran % 92.7 (H) 38.0 - 73.0 %    Lymph % 1.6 (L) 18.0 - 48.0 %    Mono % 3.5 (L) 4.0 - 15.0 %    Eosinophil % 1.1 0.0 - 8.0 %    Basophil % 0.6 0.0 - 1.9 %    Platelet Estimate Appears normal     Differential Method Automated    Comprehensive metabolic panel    Collection Time: 02/19/25  9:54 AM   Result Value Ref Range    Sodium 124 (L) 136 - 145 mmol/L    Potassium 4.3 3.5 - 5.1 mmol/L    Chloride 90 (L) 95 - 110 mmol/L    CO2 24 23 - 29 mmol/L    Glucose 99 70 - 110 mg/dL    BUN 31 (H) 8 - 23 mg/dL    Creatinine 1.2 0.5 - 1.4 mg/dL    Calcium 8.8 8.7 - 10.5 mg/dL    Total Protein 6.4 6.0 - 8.4 g/dL    Albumin 3.0 (L) 3.5 - 5.2 g/dL    Total Bilirubin 1.0 0.1 - 1.0 mg/dL    Alkaline Phosphatase 70 40 - 150 U/L    AST 37 10 - 40 U/L    ALT 45 (H) 10 - 44 U/L    eGFR >60 >60 mL/min/1.73 m^2    Anion Gap 10 8 - 16 mmol/L   Blood culture x two cultures. Draw prior to antibiotics.    Collection Time: 02/19/25  9:55 AM    Specimen: Peripheral, Hand, Left; Blood   Result Value Ref Range    Blood Culture, Routine No Growth to date     Blood Culture, Routine No Growth to date    EKG 12-lead    Collection Time: 02/19/25 10:03 AM   Result Value Ref Range    QRS Duration 94 ms    [facility] QTC Calculation 394 ms   ISTAT Lactate    Collection Time: 02/19/25 10:03 AM   Result Value Ref Range    POC Lactate 1.24 0.5 - 2.2 mmol/L    Sample VENOUS    Hepatitis C Antibody    Collection Time: 02/19/25 12:20 PM   Result Value Ref Range    Hepatitis C Ab Negative Negative   HIV 1/2 Ag/Ab (4th  Gen)    Collection Time: 02/19/25 12:20 PM   Result Value Ref Range    HIV 1/2 Ag/Ab Negative Negative   Lactic acid, plasma #2    Collection Time: 02/19/25  1:42 PM   Result Value Ref Range    Lactate (Lactic Acid) 1.0 0.5 - 2.2 mmol/L   Basic Metabolic Panel    Collection Time: 02/19/25  1:42 PM   Result Value Ref Range    Sodium 127 (L) 136 - 145 mmol/L    Potassium 3.9 3.5 - 5.1 mmol/L    Chloride 97 95 - 110 mmol/L    CO2 23 23 - 29 mmol/L    Glucose 94 70 - 110 mg/dL    BUN 26 (H) 8 - 23 mg/dL    Creatinine 0.9 0.5 - 1.4 mg/dL    Calcium 7.8 (L) 8.7 - 10.5 mg/dL    Anion Gap 7 (L) 8 - 16 mmol/L    eGFR >60 >60 mL/min/1.73 m^2   Osmolality, Serum    Collection Time: 02/19/25  1:42 PM   Result Value Ref Range    Osmolality 269 (L) 280 - 300 mOsm/kg   TSH    Collection Time: 02/19/25  1:42 PM   Result Value Ref Range    TSH 1.515 0.400 - 4.000 uIU/mL   Urinalysis, Reflex to Urine Culture Urine, Clean Catch    Collection Time: 02/19/25  1:47 PM    Specimen: Urine   Result Value Ref Range    Specimen UA Urine, Clean Catch     Color, UA Yellow Yellow, Straw, Babita    Appearance, UA Clear Clear    pH, UA 6.0 5.0 - 8.0    Specific Gravity, UA 1.010 1.005 - 1.030    Protein, UA Trace (A) Negative    Glucose, UA Negative Negative    Ketones, UA Negative Negative    Bilirubin (UA) Negative Negative    Occult Blood UA Trace (A) Negative    Nitrite, UA Negative Negative    Urobilinogen, UA Negative <2.0 EU/dL    Leukocytes, UA Negative Negative   Sodium, urine, random    Collection Time: 02/19/25  1:47 PM   Result Value Ref Range    Sodium, Urine <20 (A) 20 - 250 mmol/L   Creatinine, urine, random    Collection Time: 02/19/25  1:47 PM   Result Value Ref Range    Creatinine, Urine 36.9 23.0 - 375.0 mg/dL   Osmolality, urine    Collection Time: 02/19/25  1:47 PM   Result Value Ref Range    Osmolality, Urine 223 50 - 1200 mOsm/kg   CBC Auto Differential    Collection Time: 02/20/25  5:08 AM   Result Value Ref Range    WBC  6.69 3.90 - 12.70 K/uL    RBC 3.33 (L) 4.60 - 6.20 M/uL    Hemoglobin 10.8 (L) 14.0 - 18.0 g/dL    Hematocrit 30.5 (L) 40.0 - 54.0 %    MCV 92 82 - 98 fL    MCH 32.4 (H) 27.0 - 31.0 pg    MCHC 35.4 32.0 - 36.0 g/dL    RDW 12.6 11.5 - 14.5 %    Platelets 130 (L) 150 - 450 K/uL    MPV 10.7 9.2 - 12.9 fL    Immature Granulocytes 0.3 0.0 - 0.5 %    Gran # (ANC) 6.0 1.8 - 7.7 K/uL    Immature Grans (Abs) 0.02 0.00 - 0.04 K/uL    Lymph # 0.3 (L) 1.0 - 4.8 K/uL    Mono # 0.2 (L) 0.3 - 1.0 K/uL    Eos # 0.2 0.0 - 0.5 K/uL    Baso # 0.02 0.00 - 0.20 K/uL    nRBC 0 0 /100 WBC    Gran % 89.0 (H) 38.0 - 73.0 %    Lymph % 3.7 (L) 18.0 - 48.0 %    Mono % 3.4 (L) 4.0 - 15.0 %    Eosinophil % 3.3 0.0 - 8.0 %    Basophil % 0.3 0.0 - 1.9 %    Platelet Estimate Decreased (A)     Aniso Slight     Poik Slight     Aishwarya Cells Occasional     Differential Method Automated    Basic Metabolic Panel    Collection Time: 02/20/25  5:08 AM   Result Value Ref Range    Sodium 132 (L) 136 - 145 mmol/L    Potassium 3.7 3.5 - 5.1 mmol/L    Chloride 104 95 - 110 mmol/L    CO2 20 (L) 23 - 29 mmol/L    Glucose 99 70 - 110 mg/dL    BUN 20 8 - 23 mg/dL    Creatinine 0.9 0.5 - 1.4 mg/dL    Calcium 8.0 (L) 8.7 - 10.5 mg/dL    Anion Gap 8 8 - 16 mmol/L    eGFR >60 >60 mL/min/1.73 m^2   Phosphorus    Collection Time: 02/20/25  5:08 AM   Result Value Ref Range    Phosphorus 1.5 (L) 2.7 - 4.5 mg/dL   Magnesium    Collection Time: 02/20/25  5:08 AM   Result Value Ref Range    Magnesium 1.9 1.6 - 2.6 mg/dL   Osmolality, Serum    Collection Time: 02/20/25  5:08 AM   Result Value Ref Range    Osmolality 279 (L) 280 - 300 mOsm/kg   CBC Auto Differential    Collection Time: 02/21/25  3:54 AM   Result Value Ref Range    WBC 6.29 3.90 - 12.70 K/uL    RBC 3.30 (L) 4.60 - 6.20 M/uL    Hemoglobin 10.6 (L) 14.0 - 18.0 g/dL    Hematocrit 30.6 (L) 40.0 - 54.0 %    MCV 93 82 - 98 fL    MCH 32.1 (H) 27.0 - 31.0 pg    MCHC 34.6 32.0 - 36.0 g/dL    RDW 12.8 11.5 - 14.5 %     Platelets 137 (L) 150 - 450 K/uL    MPV 10.4 9.2 - 12.9 fL    Immature Granulocytes 0.5 0.0 - 0.5 %    Gran # (ANC) 5.1 1.8 - 7.7 K/uL    Immature Grans (Abs) 0.03 0.00 - 0.04 K/uL    Lymph # 0.5 (L) 1.0 - 4.8 K/uL    Mono # 0.5 0.3 - 1.0 K/uL    Eos # 0.2 0.0 - 0.5 K/uL    Baso # 0.01 0.00 - 0.20 K/uL    nRBC 0 0 /100 WBC    Gran % 80.9 (H) 38.0 - 73.0 %    Lymph % 7.8 (L) 18.0 - 48.0 %    Mono % 7.9 4.0 - 15.0 %    Eosinophil % 2.7 0.0 - 8.0 %    Basophil % 0.2 0.0 - 1.9 %    Differential Method Automated    Comprehensive Metabolic Panel    Collection Time: 02/21/25  3:54 AM   Result Value Ref Range    Sodium 134 (L) 136 - 145 mmol/L    Potassium 3.8 3.5 - 5.1 mmol/L    Chloride 107 95 - 110 mmol/L    CO2 22 (L) 23 - 29 mmol/L    Glucose 96 70 - 110 mg/dL    BUN 14 8 - 23 mg/dL    Creatinine 0.9 0.5 - 1.4 mg/dL    Calcium 8.3 (L) 8.7 - 10.5 mg/dL    Total Protein 5.2 (L) 6.0 - 8.4 g/dL    Albumin 2.2 (L) 3.5 - 5.2 g/dL    Total Bilirubin 0.5 0.1 - 1.0 mg/dL    Alkaline Phosphatase 182 (H) 40 - 150 U/L    AST 47 (H) 10 - 40 U/L    ALT 54 (H) 10 - 44 U/L    eGFR >60 >60 mL/min/1.73 m^2    Anion Gap 5 (L) 8 - 16 mmol/L   Magnesium    Collection Time: 02/21/25  3:54 AM   Result Value Ref Range    Magnesium 1.9 1.6 - 2.6 mg/dL   Phosphorus    Collection Time: 02/21/25  3:54 AM   Result Value Ref Range    Phosphorus 2.2 (L) 2.7 - 4.5 mg/dL   Calcium, ionized    Collection Time: 02/21/25  3:54 AM   Result Value Ref Range    Ionized Calcium 1.14 1.06 - 1.42 mmol/L   Lactic Acid, Plasma    Collection Time: 02/21/25  3:54 AM   Result Value Ref Range    Lactate (Lactic Acid) 0.7 0.5 - 2.2 mmol/L   Osmolality, Serum    Collection Time: 02/21/25  3:54 AM   Result Value Ref Range    Osmolality 279 (L) 280 - 300 mOsm/kg   Fibrinogen    Collection Time: 02/21/25  3:54 AM   Result Value Ref Range    Fibrinogen 699 (H) 182 - 400 mg/dL   VANCOMYCIN, TROUGH    Collection Time: 02/21/25  9:59 AM   Result Value Ref Range     Vancomycin-Trough 4.6 (L) 10.0 - 22.0 ug/mL        Imaging  US Chest Mediastinum  Result Date: 2/21/2025  EXAMINATION: US CHEST MEDIASTINUM CLINICAL HISTORY: Evaluate for drainable fluid collection on right upper chest wall; TECHNIQUE: Transverse and longitudinal sonographic images of the upper chest wall obtained to assess for drainable fluid collection. COMPARISON: None FINDINGS: Subcutaneous edema and mild increased vascularity which can be seen with cellulitis.  No drainable fluid collection. A few lymph nodes are seen which are long, thin, and maintain an echogenic hilum.  Suspect these are reactive.     Suspected cellulitis with no focal fluid collection. Electronically signed by: Alem Estrella Date:    02/21/2025 Time:    11:36    X-Ray Chest AP Portable  Result Date: 2/19/2025  EXAMINATION: XR CHEST AP PORTABLE CLINICAL HISTORY: Sepsis; TECHNIQUE: Single frontal view of the chest was performed. COMPARISON: 02/17/2025 FINDINGS: Lungs are well expanded.  No acute consolidation, pleural effusion, or pneumothorax. Cardiac silhouette is normal in size.  Calcified plaque lines arch.     No acute cardiopulmonary process seen. Electronically signed by: Alem Estrella Date:    02/19/2025 Time:    10:47    X-Ray Chest PA And Lateral  Result Date: 2/17/2025  EXAMINATION: XR CHEST PA AND LATERAL CLINICAL HISTORY: Other chest pain TECHNIQUE: PA and lateral views of the chest were performed. COMPARISON: 02/16/2017. FINDINGS: Mild pulmonary hyperinflation.The lungs are clear.  No focal consolidation.  Heart size is normal.  Mediastinal contours unremarkable. Bony thorax intact.     No acute chest disease. Electronically signed by: Julian Rain Date:    02/17/2025 Time:    09:11      Assessment    Sinus pause   Asymptomatic    Plan and Discussion    Patient has good functional status with no symptoms.  May have some early sick sinus syndrome.  But nothing that would warrant further investigations or monitoring at this  point.  If by tomorrow he has had no significant arrhythmias, discontinue telemetry monitoring.      Pedro Hansen MD         [1]   Current Facility-Administered Medications   Medication Dose Route Frequency Provider Last Rate Last Admin    0.9% NaCl infusion   Intravenous Continuous Johny Iglesias  mL/hr at 02/21/25 0604 New Bag at 02/21/25 0604    acetaminophen tablet 1,000 mg  1,000 mg Oral Q8H PRN Johny Iglesias MD   1,000 mg at 02/20/25 0600    atorvastatin tablet 20 mg  20 mg Oral Daily Johny Iglesias MD   20 mg at 02/21/25 1015    cefTRIAXone injection 2 g  2 g Intravenous Q24H Shari Jordan MD        cetirizine tablet 10 mg  10 mg Oral Daily Johny Iglesias MD   10 mg at 02/21/25 1015    enoxaparin injection 40 mg  40 mg Subcutaneous Daily Johny Iglesias MD   40 mg at 02/20/25 1720    naloxone 0.4 mg/mL injection 0.02 mg  0.02 mg Intravenous PRN Johny Iglesias MD        ondansetron injection 8 mg  8 mg Intravenous Q8H PRN Johny Iglesias MD        oxyCODONE immediate release tablet 5 mg  5 mg Oral Q6H PRN Johny Iglesias MD        vancomycin - pharmacy to dose   Intravenous pharmacy to manage frequency Johny Iglesias MD        vancomycin 1750 mg in 0.9% sodium chloride 500 mL IVPB  1,750 mg Intravenous Q12H Johny Iglesias MD       [2]   Social History  Socioeconomic History    Marital status:    Occupational History    Occupation: Collections   Tobacco Use    Smoking status: Never    Smokeless tobacco: Never   Substance and Sexual Activity    Alcohol use: Yes     Alcohol/week: 7.0 standard drinks of alcohol     Types: 1 Glasses of wine, 6 Cans of beer per week    Drug use: Never    Sexual activity: Yes     Partners: Female     Birth control/protection: None   Social History Narrative    Lives w/wife.  3 adult children.       Social Drivers of Health     Financial Resource Strain: Low Risk  (2/20/2025)    Overall Financial Resource Strain  (CARDIA)     Difficulty of Paying Living Expenses: Not hard at all   Food Insecurity: No Food Insecurity (2/20/2025)    Hunger Vital Sign     Worried About Running Out of Food in the Last Year: Never true     Ran Out of Food in the Last Year: Never true   Transportation Needs: No Transportation Needs (2/18/2025)    PRAPARE - Transportation     Lack of Transportation (Medical): No     Lack of Transportation (Non-Medical): No   Physical Activity: Sufficiently Active (2/18/2025)    Exercise Vital Sign     Days of Exercise per Week: 5 days     Minutes of Exercise per Session: 60 min   Stress: No Stress Concern Present (2/20/2025)    [employer] - Occupational Stress Questionnaire     Feeling of Stress : Not at all   Housing Stability: Low Risk  (2/20/2025)    Housing Stability Vital Sign     Unable to Pay for Housing in the Last Year: No     Homeless in the Last Year: No

## 2025-02-21 NOTE — PROGRESS NOTES
[facility] Surg [address] Medicine  Progress Note    Patient Name: Hugh Bates  MRN: [id number]  Patient Class: IP- Inpatient   Admission Date: 2025  Length of Stay: 1 days  Attending Physician: Johny Iglesias MD  Primary Care Provider: Joseph Santana MD        Principal Problem:Sepsis        HPI:  Patient is a 69 year-old man with history of dyslipidemia who reports suffering an abrasion to his right hand while cleaning her late mother's house in [facility] this last Friday on 2024.  He reports bleeding from his abrasion and he placed a Band-Aid with resolution of bleeding.  He reports developed diffuse muscle pains on Saturday/ which he initially attributed to recent exercise along with statin use.  However he reports he developed worsening right arm pain with chills and low grade fever.  Patient evaluated in the emergency department and prescribed cephalexin to treat cellulitis.  Patient reports taking 4 doses of cephalexin without improvement and noted worsening pain in his right axilla and redness that extended to his right chest wall and spread down the right side towards his waistline.  He denies any redness elsewhere.    He denies any known drug allergies.  He takes loratadine for allergic sinusitis. Reports he walked [place] and maybe touched the water with his right hand but otherwise denies any other recent exposures.  He reports not any any raw oysters or other uncooked food. No recent sick contacts. No wheezing or shortness of breath or cough. No nausea, vomiting, diarrhea, or abdominal pain.     He reports non-tobacco smoker. No history of substance abuse.  He reports that his father  of lung cancer at 43 years of age but otherwise denies any other family history.  He reports he is mostly retried .    Overview/Hospital Course:  Patient is a 69 year-old man admitted to the hospital with evidence of  worsening cellulitis despite oral antibiotics with cephalexin.  Erythema and tenderness involves right axilla and right chest wall.  Patient also noted to be hyponatremia.  History of prior hyponatremia however appears to be hypovolemic hyponatremia at this time with low urine sodium and hypotension.  Clinically improving with broad-spectrum intravenous antibiotics and volume resuscitation with intravenous fluids.    Patient developed some induration involving the upper right chest wall.  Ultrasound performed did not show drainable fluid collection.    Interval History:  Further regression of the erythema.  Area of fluctuance noted to right upper chest wall however ultrasound negative for drainable fluid collection.    Blood cultures no growth to date.    Review of Systems   Constitutional:  Negative for chills and fever.   Respiratory:  Negative for shortness of breath.    Cardiovascular:  Negative for chest pain.   Gastrointestinal:  Negative for abdominal pain, constipation, diarrhea, nausea and vomiting.     Objective:     Vital Signs (Most Recent):  Temp: 98.2 °F (36.8 °C) (02/21/25 1343)  Pulse: 62 (02/21/25 1343)  Resp: 18 (02/21/25 1343)  BP: 105/60 (02/21/25 1343)  SpO2: 97 % (02/21/25 1343) Vital Signs (24h Range):  Temp:  [97.7 °F (36.5 °C)-98.8 °F (37.1 °C)] 98.2 °F (36.8 °C)  Pulse:  [38-66] 62  Resp:  [16-20] 18  SpO2:  [94 %-98 %] 97 %  BP: (100-128)/(60-70) 105/60     Weight: 71.7 kg (158 lb)  Body mass index is 23.33 kg/m².    Intake/Output Summary (Last 24 hours) at 2/21/2025 1411  Last data filed at 2/21/2025 0651  Gross per 24 hour   Intake 4168.03 ml   Output 2550 ml   Net 1618.03 ml         Physical Exam  Cardiovascular:      Rate and Rhythm: Normal rate and regular rhythm.      Heart sounds: Normal heart sounds. No murmur heard.     No friction rub. No gallop.   Pulmonary:      Effort: Pulmonary effort is normal. No respiratory distress.      Breath sounds: Normal breath sounds. No wheezing or  rales.   Abdominal:      General: Bowel sounds are normal. There is no distension.      Palpations: Abdomen is soft.      Tenderness: There is no abdominal tenderness.   Musculoskeletal:         General: No tenderness. Normal range of motion.   Skin:     General: Skin is warm and dry.      Coloration: Skin is not pale.      Findings: Erythema present. No rash.      Comments: Further regression area of erythema involving the right chest wall.  Area of fluctuance of the right upper chest wall.   Neurological:      Mental Status: He is alert and oriented to person, place, and time.             Significant Labs: All pertinent labs within the past 24 hours have been reviewed.    Significant Imaging: I have reviewed all pertinent imaging results/findings within the past 24 hours.    Assessment and Plan     * Sepsis  Patient presents with subjective fevers, chills, and worsening erythema and tenderness involving right axillae and right chest wall concerning for worsening cellulitis with treatment failure with oral antibiotics.  Mildly hypotensive but not in shock.  White count normal range but with neutrophil prominence consistent with bacteria infection.  Area of initial injury to right hand seems to be improved but likely source of infection.    Allergic reaction to cefalexin also possible but less likely as I would expect to see more of a symmetric skin reaction with allergy.  Also without any appreciable improvement with diphenhydramine.    Patient with some regression of area of erythema on broad-spectrum antibiotics.  Subjectively feeling better today.  Blood cultures no growth today but early.  Continue to cover broad-spectrum antibiotics pending blood culture results and further response to treatment.    Drop in hemoglobin likely due to hemoconcentration on presentation.  Mild thrombocytopenia likely due to sepsis.  Hemoglobin stable.  But stable/improving.  Continue to monitor blood counts.    Patient with an area  of fluctuance in the right upper chest wall.  Hoping for possible drainage however ultrasound shows no drainable fluid collection.    ID consult recommended switching to intravenous ceftriaxone with vancomycin.    If cultures remain negative and continuous clinical improvement plan transition to oral doxycycline plus cefpodoxime.    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  Patient reports worsening hyponatremia now.  Clinically and laboratory studies support hypotonic hypovolemic hyponatremia.  Clinically improving with volume resuscitation with isotonic normal saline.  We will continue.  TSH normal.    Hypophosphatemia  Will correct hypophosphatemia and encourage intake.  Repeat electrolytes tomorrow.    Dyslipidemia  Continue statin therapy.      VTE Risk Mitigation (From admission, onward)           Ordered     enoxaparin injection 40 mg  Daily         02/19/25 1237     IP VTE HIGH RISK PATIENT  Once         02/19/25 1237     Place sequential compression device  Until discontinued         02/19/25 1237                    Johny Iglesias MD  Department of Hospital Medicine   [facility] Surg ([address])

## 2025-02-21 NOTE — PLAN OF CARE
Problem: Adult Inpatient Plan of Care  Goal: Plan of Care Review  Outcome: Progressing  Goal: Patient-Specific Goal (Individualized)  Outcome: Progressing  Goal: Absence of Hospital-Acquired Illness or Injury  Outcome: Progressing  Goal: Optimal Comfort and Wellbeing  Outcome: Progressing     Problem: Sepsis/Septic Shock  Goal: Absence of Infection Signs and Symptoms  Outcome: Progressing     POC reviewed with patient and wife. All questions and concerns addressed. Fall/safety precautions implemented and maintained. Urinal provided and within reach. IV abx administered. IVF continued. No acute events noted this shift. Please see flowsheet for full assessment and vitals. Bed locked in lowest position. Side rails up x2. Call bell within reach.

## 2025-02-21 NOTE — PROGRESS NOTES
Pharmacokinetic Assessment Follow Up: IV Vancomycin    Vancomycin serum concentration assessment(s):    The trough level was drawn correctly and can be used to guide therapy at this time. The measurement is below the desired definitive target range of 10 to 20 mcg/mL.    Vancomycin Regimen Plan:    Change regimen to Vancomycin 1750 mg IV every 12 hours with next serum trough concentration measured at 09:00 prior to 10:00 dose on 2/23/2025    Drug levels (last 3 results):  Recent Labs   Lab Result Units 02/21/25  0959   Vancomycin-Trough ug/mL 4.6*       Pharmacy will continue to follow and monitor vancomycin.    Please contact pharmacy at extension [phone] for questions regarding this assessment.    Thank you for the consult,   Hedy Dougherty       Patient brief summary:  Hugh Bates is a 69 y.o. male initiated on antimicrobial therapy with IV Vancomycin for treatment of bacteremia    The patient's current regimen is vancomycin 1500 mg every 24 hours    Drug Allergies:   Review of patient's allergies indicates:  No Known Allergies    Actual Body Weight:   71.7 kg    Renal Function:   Estimated Creatinine Clearance: 78.6 mL/min (based on SCr of 0.9 mg/dL).,     Dialysis Method (if applicable):  N/A    CBC (last 72 hours):  Recent Labs   Lab Result Units 02/19/25  0954 02/20/25  0508 02/21/25  0354   WBC K/uL 7.90 6.69 6.29   Hemoglobin g/dL 13.1* 10.8* 10.6*   Hematocrit % 37.5* 30.5* 30.6*   Platelets K/uL 141* 130* 137*   Gran % % 92.7* 89.0* 80.9*   Lymph % % 1.6* 3.7* 7.8*   Mono % % 3.5* 3.4* 7.9   Eosinophil % % 1.1 3.3 2.7   Basophil % % 0.6 0.3 0.2   Differential Method  Automated Automated Automated       Metabolic Panel (last 72 hours):  Recent Labs   Lab Result Units 02/19/25  0954 02/19/25  1342 02/19/25  1347 02/20/25  0508 02/21/25  0354   Sodium mmol/L 124* 127*  --  132* 134*   Sodium, Urine mmol/L  --   --  <20*  --   --    Potassium mmol/L 4.3 3.9  --  3.7 3.8   Chloride mmol/L 90* 97  --  104  107   CO2 mmol/L 24 23  --  20* 22*   Glucose mg/dL 99 94  --  99 96   Glucose, UA   --   --  Negative  --   --    BUN mg/dL 31* 26*  --  20 14   Creatinine mg/dL 1.2 0.9  --  0.9 0.9   Creatinine, Urine mg/dL  --   --  36.9  --   --    Albumin g/dL 3.0*  --   --   --  2.2*   Total Bilirubin mg/dL 1.0  --   --   --  0.5   Alkaline Phosphatase U/L 70  --   --   --  182*   AST U/L 37  --   --   --  47*   ALT U/L 45*  --   --   --  54*   Magnesium mg/dL  --   --   --  1.9 1.9   Phosphorus mg/dL  --   --   --  1.5* 2.2*       Vancomycin Administrations:  vancomycin given in the last 96 hours                     vancomycin 1,500 mg in 0.9% NaCl 250 mL IVPB (admixture device) (mg) 1,500 mg New Bag 02/21/25 1015      Restarted 02/20/25 1201     1,500 mg New Bag  1057    vancomycin 1750 mg in 0.9% sodium chloride 500 mL IVPB (mg) 1,750 mg New Bag 02/19/25 1101                    Microbiologic Results:  Microbiology Results (last 7 days)       Procedure Component Value Units Date/Time    Blood culture x two cultures. Draw prior to antibiotics. [[id number]] Collected: 02/19/25 0955    Order Status: Completed Specimen: Blood from Peripheral, Hand, Left Updated: 02/20/25 2012     Blood Culture, Routine No Growth to date      No Growth to date    Narrative:      Aerobic and anaerobic    Blood culture x two cultures. Draw prior to antibiotics. [[id number]] Collected: 02/19/25 0954    Order Status: Completed Specimen: Blood from Peripheral, Antecubital, Right Updated: 02/20/25 2012     Blood Culture, Routine No Growth to date      No Growth to date    Narrative:      Aerobic and anaerobic

## 2025-02-21 NOTE — CONSULTS
[facility] Surg ([address])  Infectious Disease  Consult Note    Patient Name: Hugh Bates  MRN: [id number]  Admission Date: 2025  Hospital Length of Stay: 1 days  Attending Physician: Johny Iglesias MD  Primary Care Provider: Joseph Santana MD     Isolation Status: No active isolations    Patient information was obtained from patient, past medical records, and ER records.      Inpatient consult to Infectious Diseases  Consult performed by: hSari Jordan MD  Consult ordered by: Johny Iglesias MD        Assessment/Plan:     ID  Cellulitis of chest wall  68y/o M pt with hx of HLD who presented with worsening R chest wall erythema and was admitted  for cellulitis. Of note, he suffered a small laceration to R 2 finger knuckle on  while cleaning out his  mom's house. Proceeded to have R axilla pain 3 days later as well as fever and chills. Symptoms did not improve on keflex. Then presented with R chest erythema that is now improving on vanc and zosyn.    It is possible that R hand wound may have been the entry point for infection. Exposed wound to [place] water while [place] the beach, so could have an atypical infection. However, is improving on current abx. Has new area of induration near R axilla concerning for LN vs abscess. No animal exposures.    Recommendations:  --agree with US and possible aspiration of area of induration- send for gram stain, aerobic, anaerobic cx  --ok to continue vanc pending cultures  --will switch  zosyn to ceftriaxone 2g iv q 24h  --monitor for continued improvement  --if no growth on cx will consider transitioning to doxy + cefpodoxime       A/p discussed with primary team.     Thank you for your consult. I will follow-up with patient. Please contact us if you have any additional questions.    Shari Jordan MD  Infectious Disease  [facility] Surg ([address])    Subjective:     Principal Problem: Sepsis    HPI:   Jana is a 70y/o M pt with hx of HLD who presented with worsening R chest wall erythema and was admitted 2/19 for cellulitis.     Reports he was cleaning out his mom's old home on 2/14 and noticed he scraped his hand. The next day he went to the gym. On sat he walked on the beach in [place], [place] and picked up trash. Likely submerged his hand in the ocean. He also took a long bath. By Monday morning he was experiencing fevers and chills and pain at R axilla. Denied any erythema or drainage at site of original hand lesion. Went to ED 2/17 and was prescribed keflex. On Tues he noticed the right side of his chest and abdomen were red and warm.     In the ED he was afebrile. CXR clear. Started on empiric vanc and zosyn.       ID consulted for: Extensive right chest wall cellulitis following abrasion to the right hand      Past Medical History:   Diagnosis Date    Basal cell carcinoma     Urinary tract infection        Review of Systems   Constitutional:  Positive for chills and fever.   Musculoskeletal:  Positive for myalgias. Negative for joint swelling.   Skin:  Positive for color change, rash and wound.   All other systems reviewed and are negative.    Objective:     Vital Signs (Most Recent):  Temp: 97.7 °F (36.5 °C) (02/21/25 0807)  Pulse: (!) 48 (02/21/25 1114)  Resp: 18 (02/21/25 0807)  BP: 115/69 (02/21/25 0807)  SpO2: 98 % (02/21/25 0807) Vital Signs (24h Range):  Temp:  [97.7 °F (36.5 °C)-98.8 °F (37.1 °C)] 97.7 °F (36.5 °C)  Pulse:  [38-66] 48  Resp:  [16-20] 18  SpO2:  [94 %-98 %] 98 %  BP: (100-128)/(57-70) 115/69     Weight: 71.7 kg (158 lb)  Body mass index is 23.33 kg/m².    Estimated Creatinine Clearance: 78.6 mL/min (based on SCr of 0.9 mg/dL).     Physical Exam  Vitals and nursing note reviewed.   Constitutional:       Appearance: Normal appearance. He is not ill-appearing.   HENT:      Head: Normocephalic and atraumatic.      Nose: Nose normal. No congestion.      Mouth/Throat:      Mouth:  Mucous membranes are moist.      Pharynx: Oropharynx is clear.   Eyes:      Conjunctiva/sclera: Conjunctivae normal.      Pupils: Pupils are equal, round, and reactive to light.   Cardiovascular:      Rate and Rhythm: Normal rate and regular rhythm.   Pulmonary:      Effort: Pulmonary effort is normal. No respiratory distress.      Breath sounds: Normal breath sounds.   Abdominal:      General: Abdomen is flat. There is no distension.      Palpations: Abdomen is soft.   Musculoskeletal:         General: No swelling. Normal range of motion.      Cervical back: Normal range of motion and neck supple.   Skin:     General: Skin is warm and dry.      Comments: R hand- skin ulceration at 1 st knuckle- no erythema or drainage  Erythematous skin encompassing R side of chest, extending to waist and r flank. Appears to be receding from demarcated borders. Does have induration and tenderness near R axilla.    Neurological:      General: No focal deficit present.      Mental Status: He is alert and oriented to person, place, and time.   Psychiatric:         Mood and Affect: Mood normal.         Behavior: Behavior normal.          Significant Labs: Blood Culture:   Recent Labs   Lab 02/19/25  0954 02/19/25  0955   LABBLOO No Growth to date  No Growth to date No Growth to date  No Growth to date     All pertinent labs within the past 24 hours have been reviewed.    Significant Imaging: I have reviewed all pertinent imaging results/findings within the past 24 hours.

## 2025-02-21 NOTE — SUBJECTIVE & OBJECTIVE
Past Medical History:   Diagnosis Date    Basal cell carcinoma     Urinary tract infection        Review of Systems   Constitutional:  Positive for chills and fever.   Musculoskeletal:  Positive for myalgias. Negative for joint swelling.   Skin:  Positive for color change, rash and wound.   All other systems reviewed and are negative.    Objective:     Vital Signs (Most Recent):  Temp: 97.7 °F (36.5 °C) (02/21/25 0807)  Pulse: (!) 48 (02/21/25 1114)  Resp: 18 (02/21/25 0807)  BP: 115/69 (02/21/25 0807)  SpO2: 98 % (02/21/25 0807) Vital Signs (24h Range):  Temp:  [97.7 °F (36.5 °C)-98.8 °F (37.1 °C)] 97.7 °F (36.5 °C)  Pulse:  [38-66] 48  Resp:  [16-20] 18  SpO2:  [94 %-98 %] 98 %  BP: (100-128)/(57-70) 115/69     Weight: 71.7 kg (158 lb)  Body mass index is 23.33 kg/m².    Estimated Creatinine Clearance: 78.6 mL/min (based on SCr of 0.9 mg/dL).     Physical Exam  Vitals and nursing note reviewed.   Constitutional:       Appearance: Normal appearance. He is not ill-appearing.   HENT:      Head: Normocephalic and atraumatic.      Nose: Nose normal. No congestion.      Mouth/Throat:      Mouth: Mucous membranes are moist.      Pharynx: Oropharynx is clear.   Eyes:      Conjunctiva/sclera: Conjunctivae normal.      Pupils: Pupils are equal, round, and reactive to light.   Cardiovascular:      Rate and Rhythm: Normal rate and regular rhythm.   Pulmonary:      Effort: Pulmonary effort is normal. No respiratory distress.      Breath sounds: Normal breath sounds.   Abdominal:      General: Abdomen is flat. There is no distension.      Palpations: Abdomen is soft.   Musculoskeletal:         General: No swelling. Normal range of motion.      Cervical back: Normal range of motion and neck supple.   Skin:     General: Skin is warm and dry.      Comments: R hand- skin ulceration at 1 st knuckle- no erythema or drainage  Erythematous skin encompassing R side of chest, extending to waist and r flank. Appears to be receding from  demarcated borders. Does have induration and tenderness near R axilla.    Neurological:      General: No focal deficit present.      Mental Status: He is alert and oriented to person, place, and time.   Psychiatric:         Mood and Affect: Mood normal.         Behavior: Behavior normal.          Significant Labs: Blood Culture:   Recent Labs   Lab 02/19/25  0954 02/19/25  0955   LABBLOO No Growth to date  No Growth to date No Growth to date  No Growth to date     All pertinent labs within the past 24 hours have been reviewed.    Significant Imaging: I have reviewed all pertinent imaging results/findings within the past 24 hours.

## 2025-02-21 NOTE — ASSESSMENT & PLAN NOTE
Patient presents with subjective fevers, chills, and worsening erythema and tenderness involving right axillae and right chest wall concerning for worsening cellulitis with treatment failure with oral antibiotics.  Mildly hypotensive but not in shock.  White count normal range but with neutrophil prominence consistent with bacteria infection.  Area of initial injury to right hand seems to be improved but likely source of infection.    Allergic reaction to cefalexin also possible but less likely as I would expect to see more of a symmetric skin reaction with allergy.  Also without any appreciable improvement with diphenhydramine.    Patient with some regression of area of erythema on broad-spectrum antibiotics.  Subjectively feeling better today.  Blood cultures no growth today but early.  Continue to cover broad-spectrum antibiotics pending blood culture results and further response to treatment.    Drop in hemoglobin likely due to hemoconcentration on presentation.  Mild thrombocytopenia likely due to sepsis.  Hemoglobin stable.  But stable/improving.  Continue to monitor blood counts.    Patient with an area of fluctuance in the right upper chest wall.  Hoping for possible drainage however ultrasound shows no drainable fluid collection.    ID consult recommended switching to intravenous ceftriaxone with vancomycin.    If cultures remain negative and continuous clinical improvement plan transition to oral doxycycline plus cefpodoxime.

## 2025-02-22 PROBLEM — E83.39 HYPOPHOSPHATEMIA: Status: RESOLVED | Noted: 2025-02-20 | Resolved: 2025-02-22

## 2025-02-22 LAB
ALBUMIN SERPL BCP-MCNC: 2.1 G/DL (ref 3.5–5.2)
ALP SERPL-CCNC: 219 U/L (ref 40–150)
ALT SERPL W/O P-5'-P-CCNC: 66 U/L (ref 10–44)
ANION GAP SERPL CALC-SCNC: 6 MMOL/L (ref 8–16)
AST SERPL-CCNC: 54 U/L (ref 10–40)
BASOPHILS # BLD AUTO: 0.01 K/UL (ref 0–0.2)
BASOPHILS NFR BLD: 0.2 % (ref 0–1.9)
BILIRUB SERPL-MCNC: 0.4 MG/DL (ref 0.1–1)
BUN SERPL-MCNC: 13 MG/DL (ref 8–23)
CALCIUM SERPL-MCNC: 8.2 MG/DL (ref 8.7–10.5)
CHLORIDE SERPL-SCNC: 107 MMOL/L (ref 95–110)
CO2 SERPL-SCNC: 22 MMOL/L (ref 23–29)
CREAT SERPL-MCNC: 0.7 MG/DL (ref 0.5–1.4)
DIFFERENTIAL METHOD BLD: ABNORMAL
EOSINOPHIL # BLD AUTO: 0.2 K/UL (ref 0–0.5)
EOSINOPHIL NFR BLD: 3.5 % (ref 0–8)
ERYTHROCYTE [DISTWIDTH] IN BLOOD BY AUTOMATED COUNT: 13 % (ref 11.5–14.5)
EST. GFR  (NO RACE VARIABLE): >60 ML/MIN/1.73 M^2
GLUCOSE SERPL-MCNC: 93 MG/DL (ref 70–110)
HCT VFR BLD AUTO: 31.1 % (ref 40–54)
HGB BLD-MCNC: 10.6 G/DL (ref 14–18)
IMM GRANULOCYTES # BLD AUTO: 0.03 K/UL (ref 0–0.04)
IMM GRANULOCYTES NFR BLD AUTO: 0.7 % (ref 0–0.5)
LYMPHOCYTES # BLD AUTO: 0.8 K/UL (ref 1–4.8)
LYMPHOCYTES NFR BLD: 17.9 % (ref 18–48)
MAGNESIUM SERPL-MCNC: 1.8 MG/DL (ref 1.6–2.6)
MCH RBC QN AUTO: 31.5 PG (ref 27–31)
MCHC RBC AUTO-ENTMCNC: 34.1 G/DL (ref 32–36)
MCV RBC AUTO: 93 FL (ref 82–98)
MONOCYTES # BLD AUTO: 0.8 K/UL (ref 0.3–1)
MONOCYTES NFR BLD: 17.9 % (ref 4–15)
NEUTROPHILS # BLD AUTO: 2.6 K/UL (ref 1.8–7.7)
NEUTROPHILS NFR BLD: 59.8 % (ref 38–73)
NRBC BLD-RTO: 0 /100 WBC
PHOSPHATE SERPL-MCNC: 2.7 MG/DL (ref 2.7–4.5)
PLATELET # BLD AUTO: 151 K/UL (ref 150–450)
PMV BLD AUTO: 10.5 FL (ref 9.2–12.9)
POTASSIUM SERPL-SCNC: 3.8 MMOL/L (ref 3.5–5.1)
PROT SERPL-MCNC: 5 G/DL (ref 6–8.4)
RBC # BLD AUTO: 3.36 M/UL (ref 4.6–6.2)
SODIUM SERPL-SCNC: 135 MMOL/L (ref 136–145)
WBC # BLD AUTO: 4.31 K/UL (ref 3.9–12.7)

## 2025-02-22 PROCEDURE — 21400001 HC TELEMETRY ROOM: Mod: HCNC

## 2025-02-22 PROCEDURE — 25000003 PHARM REV CODE 250: Mod: HCNC | Performed by: HOSPITALIST

## 2025-02-22 PROCEDURE — 80053 COMPREHEN METABOLIC PANEL: CPT | Mod: HCNC | Performed by: HOSPITALIST

## 2025-02-22 PROCEDURE — 85025 COMPLETE CBC W/AUTO DIFF WBC: CPT | Mod: HCNC | Performed by: HOSPITALIST

## 2025-02-22 PROCEDURE — 83735 ASSAY OF MAGNESIUM: CPT | Mod: HCNC | Performed by: HOSPITALIST

## 2025-02-22 PROCEDURE — 84100 ASSAY OF PHOSPHORUS: CPT | Mod: HCNC | Performed by: HOSPITALIST

## 2025-02-22 PROCEDURE — 99232 SBSQ HOSP IP/OBS MODERATE 35: CPT | Mod: HCNC,GC,, | Performed by: INTERNAL MEDICINE

## 2025-02-22 PROCEDURE — 63600175 PHARM REV CODE 636 W HCPCS: Mod: HCNC | Performed by: HOSPITALIST

## 2025-02-22 PROCEDURE — 63600175 PHARM REV CODE 636 W HCPCS: Mod: HCNC | Performed by: INTERNAL MEDICINE

## 2025-02-22 PROCEDURE — 36415 COLL VENOUS BLD VENIPUNCTURE: CPT | Mod: HCNC | Performed by: HOSPITALIST

## 2025-02-22 RX ORDER — LORATADINE 10 MG/1
10 TABLET ORAL DAILY
Start: 2025-02-22

## 2025-02-22 RX ORDER — CEFPODOXIME PROXETIL 200 MG/1
400 TABLET, FILM COATED ORAL 2 TIMES DAILY
Qty: 28 TABLET | Refills: 0 | Status: SHIPPED | OUTPATIENT
Start: 2025-02-22 | End: 2025-03-01

## 2025-02-22 RX ORDER — ACETAMINOPHEN 500 MG
1000 TABLET ORAL EVERY 8 HOURS PRN
COMMUNITY
Start: 2025-02-22

## 2025-02-22 RX ORDER — DOXYCYCLINE 100 MG/1
100 CAPSULE ORAL EVERY 12 HOURS
Qty: 14 CAPSULE | Refills: 0 | Status: SHIPPED | OUTPATIENT
Start: 2025-02-22 | End: 2025-02-25

## 2025-02-22 RX ADMIN — VANCOMYCIN HYDROCHLORIDE 1750 MG: 500 INJECTION, POWDER, LYOPHILIZED, FOR SOLUTION INTRAVENOUS at 09:02

## 2025-02-22 RX ADMIN — ENOXAPARIN SODIUM 40 MG: 40 INJECTION SUBCUTANEOUS at 05:02

## 2025-02-22 RX ADMIN — CEFTRIAXONE SODIUM 2 G: 2 INJECTION, POWDER, FOR SOLUTION INTRAMUSCULAR; INTRAVENOUS at 01:02

## 2025-02-22 RX ADMIN — SODIUM CHLORIDE: 9 INJECTION, SOLUTION INTRAVENOUS at 08:02

## 2025-02-22 RX ADMIN — ACETAMINOPHEN 1000 MG: 500 TABLET, FILM COATED ORAL at 09:02

## 2025-02-22 RX ADMIN — CETIRIZINE HYDROCHLORIDE 10 MG: 10 TABLET, FILM COATED ORAL at 09:02

## 2025-02-22 RX ADMIN — ATORVASTATIN CALCIUM 20 MG: 20 TABLET, FILM COATED ORAL at 09:02

## 2025-02-22 NOTE — ASSESSMENT & PLAN NOTE
Patient presents with subjective fevers, chills, and worsening erythema and tenderness involving right axillae and right chest wall concerning for worsening cellulitis with treatment failure with oral antibiotics.  Mildly hypotensive but not in shock.  White count normal range but with neutrophil prominence consistent with bacteria infection.  Area of initial injury to right hand seems to be improved but likely source of infection.    Allergic reaction to cefalexin also possible but less likely as I would expect to see more of a symmetric skin reaction with allergy.  Also without any appreciable improvement with diphenhydramine.    Patient with some regression of area of erythema on broad-spectrum antibiotics.  Subjectively feeling better today.  Blood cultures no growth today but early.  Continue to cover broad-spectrum antibiotics pending blood culture results and further response to treatment.    Drop in hemoglobin likely due to hemoconcentration on presentation.  Mild thrombocytopenia likely due to sepsis.  Hemoglobin stable.  But stable/improving.  Continue to monitor blood counts.    Patient with an area of fluctuance in the right upper chest wall.  Hoping for possible drainage however ultrasound shows no drainable fluid collection.    ID consult recommended switching to intravenous ceftriaxone with vancomycin.  Continue to improve.    If cultures remain negative and continuous clinical improvement plan to discharge on oral doxycycline plus cefpodoxime tomorrow.

## 2025-02-22 NOTE — SUBJECTIVE & OBJECTIVE
Interval History:  Further regression of the erythema.     Review of Systems   Constitutional:  Negative for chills and fever.   Respiratory:  Negative for shortness of breath.    Cardiovascular:  Negative for chest pain.   Gastrointestinal:  Negative for abdominal pain, constipation, diarrhea, nausea and vomiting.     Objective:     Vital Signs (Most Recent):  Temp: 98.3 °F (36.8 °C) (02/22/25 1528)  Pulse: (!) 56 (02/22/25 1528)  Resp: 18 (02/22/25 1528)  BP: 128/67 (02/22/25 1528)  SpO2: 96 % (02/22/25 1528) Vital Signs (24h Range):  Temp:  [97.6 °F (36.4 °C)-98.3 °F (36.8 °C)] 98.3 °F (36.8 °C)  Pulse:  [49-61] 56  Resp:  [17-20] 18  SpO2:  [95 %-97 %] 96 %  BP: (112-151)/(67-79) 128/67     Weight: 71.7 kg (158 lb)  Body mass index is 23.33 kg/m².    Intake/Output Summary (Last 24 hours) at 2/22/2025 1735  Last data filed at 2/22/2025 1307  Gross per 24 hour   Intake 1763.28 ml   Output 1800 ml   Net -36.72 ml         Physical Exam  Cardiovascular:      Rate and Rhythm: Normal rate and regular rhythm.      Heart sounds: Normal heart sounds. No murmur heard.     No friction rub. No gallop.   Pulmonary:      Effort: Pulmonary effort is normal. No respiratory distress.      Breath sounds: Normal breath sounds. No wheezing or rales.   Abdominal:      General: Bowel sounds are normal. There is no distension.      Palpations: Abdomen is soft.      Tenderness: There is no abdominal tenderness.   Musculoskeletal:         General: No tenderness. Normal range of motion.   Skin:     General: Skin is warm and dry.      Coloration: Skin is not pale.      Findings: Erythema present. No rash.      Comments: Further regression area of erythema involving the right chest wall.   Neurological:      Mental Status: He is alert and oriented to person, place, and time.             Significant Labs: All pertinent labs within the past 24 hours have been reviewed.    Significant Imaging: I have reviewed all pertinent imaging  results/findings within the past 24 hours.

## 2025-02-22 NOTE — PLAN OF CARE
Problem: Adult Inpatient Plan of Care  Goal: Plan of Care Review  Outcome: Progressing  Goal: Patient-Specific Goal (Individualized)  Outcome: Progressing  Goal: Absence of Hospital-Acquired Illness or Injury  Outcome: Progressing  Goal: Optimal Comfort and Wellbeing  Outcome: Progressing     Problem: Sepsis/Septic Shock  Goal: Absence of Infection Signs and Symptoms  Outcome: Progressing     Problem: Pain Acute  Goal: Optimal Pain Control and Function  Outcome: Progressing     POC reviewed with patient and wife . All questions and concerns addressed. Fall/safety precautions implemented and maintained. Urinal provided and within reach. Pain management provided. IVF continued. IV abx administered. No acute events noted this shift. Please see flowsheet for full assessment and vitals. Bed locked in lowest position. Side rails up x2. Call bell within reach.

## 2025-02-22 NOTE — ASSESSMENT & PLAN NOTE
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  Patient reports worsening hyponatremia now.  Clinically and laboratory studies support hypotonic hypovolemic hyponatremia.  Clinically improving with volume resuscitation with isotonic normal saline.  TSH normal.  Discontinue fluids.

## 2025-02-22 NOTE — PLAN OF CARE
AAOX4. VSS.Pt free of trauma, falls, injury and skin breakdown. Pt denies pain at this time.Pt has been eating and voiding adequately throughout shift. Pt ambulates and repositions self independently. Seen by Dr Hansen(Cardiology) d/t sinus pauses. Tele maintained.Purposeful hourly rounding. Pt has call light in reach, side rails up X2, bed in low position and nonskid socks on. Pt lying in bed in no distress. US completed (R lateral chest).

## 2025-02-22 NOTE — PROGRESS NOTES
[facility] Surg [address] Medicine  Progress Note    Patient Name: Hugh Bates  MRN: [id number]  Patient Class: IP- Inpatient   Admission Date: 2025  Length of Stay: 2 days  Attending Physician: Johny Iglesias MD  Primary Care Provider: Joseph Santana MD          Principal Problem:Sepsis        HPI:  Patient is a 69 year-old man with history of dyslipidemia who reports suffering an abrasion to his right hand while cleaning her late mother's house in [facility] this last Friday on 2024.  He reports bleeding from his abrasion and he placed a Band-Aid with resolution of bleeding.  He reports developed diffuse muscle pains on Saturday/ which he initially attributed to recent exercise along with statin use.  However he reports he developed worsening right arm pain with chills and low grade fever.  Patient evaluated in the emergency department and prescribed cephalexin to treat cellulitis.  Patient reports taking 4 doses of cephalexin without improvement and noted worsening pain in his right axilla and redness that extended to his right chest wall and spread down the right side towards his waistline.  He denies any redness elsewhere.    He denies any known drug allergies.  He takes loratadine for allergic sinusitis. Reports he [place] and maybe touched the water with his right hand but otherwise denies any other recent exposures.  He reports not any any raw oysters or other uncooked food. No recent sick contacts. No wheezing or shortness of breath or cough. No nausea, vomiting, diarrhea, or abdominal pain.     He reports non-tobacco smoker. No history of substance abuse.  He reports that his father  of lung cancer at 43 years of age but otherwise denies any other family history.  He reports he is mostly retried .    Overview/Hospital Course:  Patient is a 69 year-old man admitted to the hospital with evidence of  worsening cellulitis despite oral antibiotics with cephalexin.  Erythema and tenderness involves right axilla and right chest wall.  Patient also noted to be hyponatremia.  History of prior hyponatremia however appears to be hypovolemic hyponatremia at this time with low urine sodium and hypotension.  Clinically improving with broad-spectrum intravenous antibiotics and volume resuscitation with intravenous fluids.    Patient developed some induration involving the upper right chest wall.  Ultrasound performed did not show drainable fluid collection.    Interval History:  Further regression of the erythema.     Review of Systems   Constitutional:  Negative for chills and fever.   Respiratory:  Negative for shortness of breath.    Cardiovascular:  Negative for chest pain.   Gastrointestinal:  Negative for abdominal pain, constipation, diarrhea, nausea and vomiting.     Objective:     Vital Signs (Most Recent):  Temp: 98.3 °F (36.8 °C) (02/22/25 1528)  Pulse: (!) 56 (02/22/25 1528)  Resp: 18 (02/22/25 1528)  BP: 128/67 (02/22/25 1528)  SpO2: 96 % (02/22/25 1528) Vital Signs (24h Range):  Temp:  [97.6 °F (36.4 °C)-98.3 °F (36.8 °C)] 98.3 °F (36.8 °C)  Pulse:  [49-61] 56  Resp:  [17-20] 18  SpO2:  [95 %-97 %] 96 %  BP: (112-151)/(67-79) 128/67     Weight: 71.7 kg (158 lb)  Body mass index is 23.33 kg/m².    Intake/Output Summary (Last 24 hours) at 2/22/2025 1735  Last data filed at 2/22/2025 1307  Gross per 24 hour   Intake 1763.28 ml   Output 1800 ml   Net -36.72 ml         Physical Exam  Cardiovascular:      Rate and Rhythm: Normal rate and regular rhythm.      Heart sounds: Normal heart sounds. No murmur heard.     No friction rub. No gallop.   Pulmonary:      Effort: Pulmonary effort is normal. No respiratory distress.      Breath sounds: Normal breath sounds. No wheezing or rales.   Abdominal:      General: Bowel sounds are normal. There is no distension.      Palpations: Abdomen is soft.      Tenderness: There is  no abdominal tenderness.   Musculoskeletal:         General: No tenderness. Normal range of motion.   Skin:     General: Skin is warm and dry.      Coloration: Skin is not pale.      Findings: Erythema present. No rash.      Comments: Further regression area of erythema involving the right chest wall.   Neurological:      Mental Status: He is alert and oriented to person, place, and time.             Significant Labs: All pertinent labs within the past 24 hours have been reviewed.    Significant Imaging: I have reviewed all pertinent imaging results/findings within the past 24 hours.    Assessment and Plan     * Sepsis  Patient presents with subjective fevers, chills, and worsening erythema and tenderness involving right axillae and right chest wall concerning for worsening cellulitis with treatment failure with oral antibiotics.  Mildly hypotensive but not in shock.  White count normal range but with neutrophil prominence consistent with bacteria infection.  Area of initial injury to right hand seems to be improved but likely source of infection.    Allergic reaction to cefalexin also possible but less likely as I would expect to see more of a symmetric skin reaction with allergy.  Also without any appreciable improvement with diphenhydramine.    Patient with some regression of area of erythema on broad-spectrum antibiotics.  Subjectively feeling better today.  Blood cultures no growth today but early.  Continue to cover broad-spectrum antibiotics pending blood culture results and further response to treatment.    Drop in hemoglobin likely due to hemoconcentration on presentation.  Mild thrombocytopenia likely due to sepsis.  Hemoglobin stable.  But stable/improving.  Continue to monitor blood counts.    Patient with an area of fluctuance in the right upper chest wall.  Hoping for possible drainage however ultrasound shows no drainable fluid collection.    ID consult recommended switching to intravenous ceftriaxone  with vancomycin.  Continue to improve.    If cultures remain negative and continuous clinical improvement plan to discharge on oral doxycycline plus cefpodoxime tomorrow.    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  Patient reports worsening hyponatremia now.  Clinically and laboratory studies support hypotonic hypovolemic hyponatremia.  Clinically improving with volume resuscitation with isotonic normal saline.  TSH normal.  Discontinue fluids.    Dyslipidemia  Continue statin therapy.      VTE Risk Mitigation (From admission, onward)           Ordered     enoxaparin injection 40 mg  Daily         02/19/25 1237     IP VTE HIGH RISK PATIENT  Once         02/19/25 1237     Place sequential compression device  Until discontinued         02/19/25 1237                      Johny Iglesias MD  Department of Hospital Medicine   [facility] - Med Surg ([address])

## 2025-02-22 NOTE — PROGRESS NOTES
[facility]    Admission date:  2/19/2025     Assessment    Sinus node dysfunction   Asymptomatic    Plan and Discussion    Discussed sinus node dysfunction and its natural history.  No need for further cardiac investigations or monitoring.    Subjective    Feels well.  Anxious for discharge.    Medications  Current Medications[1]     Physical Exam    Temp:  [97.6 °F (36.4 °C)-98.7 °F (37.1 °C)]   Pulse:  [38-62]   Resp:  [17-20]   BP: (105-151)/(60-79)   SpO2:  [96 %-98 %]    Wt Readings from Last 3 Encounters:   02/19/25 71.7 kg (158 lb)   02/19/25 71.8 kg (158 lb 4.6 oz)   02/17/25 69.4 kg (153 lb)     Physical Exam    Telemetry  Sinus rhythm and sinus bradycardia.  Occasional nonconducted PACs.    Labs  Recent Results (from the past 24 hours)   VANCOMYCIN, TROUGH    Collection Time: 02/21/25  9:59 AM   Result Value Ref Range    Vancomycin-Trough 4.6 (L) 10.0 - 22.0 ug/mL   CBC Auto Differential    Collection Time: 02/22/25  4:04 AM   Result Value Ref Range    WBC 4.31 3.90 - 12.70 K/uL    RBC 3.36 (L) 4.60 - 6.20 M/uL    Hemoglobin 10.6 (L) 14.0 - 18.0 g/dL    Hematocrit 31.1 (L) 40.0 - 54.0 %    MCV 93 82 - 98 fL    MCH 31.5 (H) 27.0 - 31.0 pg    MCHC 34.1 32.0 - 36.0 g/dL    RDW 13.0 11.5 - 14.5 %    Platelets 151 150 - 450 K/uL    MPV 10.5 9.2 - 12.9 fL    Immature Granulocytes 0.7 (H) 0.0 - 0.5 %    Gran # (ANC) 2.6 1.8 - 7.7 K/uL    Immature Grans (Abs) 0.03 0.00 - 0.04 K/uL    Lymph # 0.8 (L) 1.0 - 4.8 K/uL    Mono # 0.8 0.3 - 1.0 K/uL    Eos # 0.2 0.0 - 0.5 K/uL    Baso # 0.01 0.00 - 0.20 K/uL    nRBC 0 0 /100 WBC    Gran % 59.8 38.0 - 73.0 %    Lymph % 17.9 (L) 18.0 - 48.0 %    Mono % 17.9 (H) 4.0 - 15.0 %    Eosinophil % 3.5 0.0 - 8.0 %    Basophil % 0.2 0.0 - 1.9 %    Differential Method Automated    Comprehensive Metabolic Panel    Collection Time: 02/22/25  4:04 AM   Result Value Ref Range    Sodium 135 (L) 136 - 145 mmol/L    Potassium 3.8 3.5 - 5.1 mmol/L    Chloride 107 95 - 110  mmol/L    CO2 22 (L) 23 - 29 mmol/L    Glucose 93 70 - 110 mg/dL    BUN 13 8 - 23 mg/dL    Creatinine 0.7 0.5 - 1.4 mg/dL    Calcium 8.2 (L) 8.7 - 10.5 mg/dL    Total Protein 5.0 (L) 6.0 - 8.4 g/dL    Albumin 2.1 (L) 3.5 - 5.2 g/dL    Total Bilirubin 0.4 0.1 - 1.0 mg/dL    Alkaline Phosphatase 219 (H) 40 - 150 U/L    AST 54 (H) 10 - 40 U/L    ALT 66 (H) 10 - 44 U/L    eGFR >60 >60 mL/min/1.73 m^2    Anion Gap 6 (L) 8 - 16 mmol/L   Magnesium    Collection Time: 02/22/25  4:04 AM   Result Value Ref Range    Magnesium 1.8 1.6 - 2.6 mg/dL   Phosphorus    Collection Time: 02/22/25  4:04 AM   Result Value Ref Range    Phosphorus 2.7 2.7 - 4.5 mg/dL             Pedro Hansen MD       [1]   Current Facility-Administered Medications   Medication Dose Route Frequency Provider Last Rate Last Admin    0.9% NaCl infusion   Intravenous Continuous Johny Iglesias  mL/hr at 02/21/25 0604 New Bag at 02/21/25 0604    acetaminophen tablet 1,000 mg  1,000 mg Oral Q8H PRN Johny Iglesias MD   1,000 mg at 02/21/25 1934    atorvastatin tablet 20 mg  20 mg Oral Daily Johny Iglesias MD   20 mg at 02/21/25 1015    cefTRIAXone injection 2 g  2 g Intravenous Q24H Shari Jordan MD   2 g at 02/21/25 1340    cetirizine tablet 10 mg  10 mg Oral Daily Johny Iglesias MD   10 mg at 02/21/25 1015    enoxaparin injection 40 mg  40 mg Subcutaneous Daily Johny Iglesias MD   40 mg at 02/21/25 1648    naloxone 0.4 mg/mL injection 0.02 mg  0.02 mg Intravenous PRN Johny Iglesias MD        ondansetron injection 8 mg  8 mg Intravenous Q8H PRN Johny Iglesias MD        oxyCODONE immediate release tablet 5 mg  5 mg Oral Q6H PRN Johny Iglesias MD        vancomycin - pharmacy to dose   Intravenous pharmacy to manage frequency Johny Iglesias MD        vancomycin 1750 mg in 0.9% sodium chloride 500 mL IVPB  1,750 mg Intravenous Q12H Johny Iglesias MD   Stopped at 02/21/25 1301

## 2025-02-23 VITALS
DIASTOLIC BLOOD PRESSURE: 66 MMHG | TEMPERATURE: 98 F | SYSTOLIC BLOOD PRESSURE: 122 MMHG | BODY MASS INDEX: 23.4 KG/M2 | WEIGHT: 158 LBS | HEIGHT: 69 IN | HEART RATE: 59 BPM | OXYGEN SATURATION: 99 % | RESPIRATION RATE: 18 BRPM

## 2025-02-23 LAB — VANCOMYCIN TROUGH SERPL-MCNC: 16.3 UG/ML (ref 10–22)

## 2025-02-23 PROCEDURE — 25000003 PHARM REV CODE 250: Mod: HCNC | Performed by: HOSPITALIST

## 2025-02-23 PROCEDURE — 63600175 PHARM REV CODE 636 W HCPCS: Mod: HCNC | Performed by: HOSPITALIST

## 2025-02-23 PROCEDURE — 80202 ASSAY OF VANCOMYCIN: CPT | Mod: HCNC | Performed by: HOSPITALIST

## 2025-02-23 PROCEDURE — 36415 COLL VENOUS BLD VENIPUNCTURE: CPT | Mod: HCNC | Performed by: HOSPITALIST

## 2025-02-23 RX ADMIN — CETIRIZINE HYDROCHLORIDE 10 MG: 10 TABLET, FILM COATED ORAL at 09:02

## 2025-02-23 RX ADMIN — SODIUM CHLORIDE: 9 INJECTION, SOLUTION INTRAVENOUS at 06:02

## 2025-02-23 RX ADMIN — ACETAMINOPHEN 1000 MG: 500 TABLET, FILM COATED ORAL at 09:02

## 2025-02-23 RX ADMIN — VANCOMYCIN HYDROCHLORIDE 1750 MG: 500 INJECTION, POWDER, LYOPHILIZED, FOR SOLUTION INTRAVENOUS at 09:02

## 2025-02-23 RX ADMIN — ATORVASTATIN CALCIUM 20 MG: 20 TABLET, FILM COATED ORAL at 09:02

## 2025-02-23 NOTE — PLAN OF CARE
Free from falls, injury, or skin breakdown this hospital admission. Pt eager & in agreement w/ DC. VU of DC instructions--paperwork passed & explained--scripts called to pharmacy per MD. IV removed w/ cath tip intact, WNL. Voiding, ambulating, & tolerating PO well. To be DCd home w/ family--will be escorted downstairs via  transport team once dressed and ready.

## 2025-02-23 NOTE — DISCHARGE SUMMARY
[facility] Surg [address] Medicine  Discharge Summary      Patient Name: Hugh Bates  MRN: [id number]  ROB: [id number]  Patient Class: IP- Inpatient  Admission Date: 2025  Hospital Length of Stay: 3 days  Discharge Date and Time: 2025 12:48 PM  Attending Physician: Johny Iglesias MD  Discharging Provider: Johny Iglesias MD  Primary Care Provider: Joseph Santana MD    HPI:   Patient is a 69 year-old man with history of dyslipidemia who reports suffering an abrasion to his right hand while cleaning her late mother's house in [facility] this last Friday on 2024.  He reports bleeding from his abrasion and he placed a Band-Aid with resolution of bleeding.  He reports developed diffuse muscle pains on Saturday/ which he initially attributed to recent exercise along with statin use.  However he reports he developed worsening right arm pain with chills and low grade fever.  Patient evaluated in the emergency department and prescribed cephalexin to treat cellulitis.  Patient reports taking 4 doses of cephalexin without improvement and noted worsening pain in his right axilla and redness that extended to his right chest wall and spread down the right side towards his waistline.  He denies any redness elsewhere.    He denies any known drug allergies.  He takes loratadine for allergic sinusitis. Reports [place] [facility] and maybe touched the water with his right hand but otherwise denies any other recent exposures.  He reports not any any raw oysters or other uncooked food. No recent sick contacts. No wheezing or shortness of breath or cough. No nausea, vomiting, diarrhea, or abdominal pain.     He reports non-tobacco smoker. No history of substance abuse.  He reports that his father  of lung cancer at 43 years of age but otherwise denies any other family history.  He reports he is mostly retried .    Hospital Course:    Patient is a 69 year-old man admitted to the hospital with evidence of worsening cellulitis despite oral antibiotics with cephalexin.  Erythema and tenderness involves right axilla and right chest wall.  Patient also noted to be hyponatremia.  History of prior hyponatremia however evaluation here revealed hyponatremia secondary to hypovolemic hypotonic hyponatremia at this time with low urine sodium and hypotension.     Patient started on broad-spectrum intravenous antibiotics and volume resuscitated intravenous fluids.  Cellulitis improved.  Serum sodium trending to normal.  Patient developed some induration involving the upper right chest wall.  Ultrasound performed did not show drainable fluid collection.    Infectious disease service consulted recommended switching to intravenous vancomycin and ceftriaxone.  Patient cellulitis continued to improve.  Blood cultures remain no growth to date.  Patient clinically stable to discharge home complete a course of empiric oral antibiotics to target likely skin soft tissue infection organisms.    Patient advised to monitor for further improvement and resolution of his infection.  Patient advised to return to the hospital in the event of fever or worsening redness, swollen this or pain.    Patient noted to have mild laboratory abnormalities including normocytic anemia.  Patient did not manifest any evidence of her bleeding.  Suspect anemia likely related to initial hemoconcentration on presentation along with degree of iatrogenic anemia from phlebotomy to monitor his laboratory studies during this hospitalization.    Patient also noted to have mild elevation in alkaline phosphatase.  Mild elevation in liver enzymes however patient recently started on statin therapy unlikely related to statin therapy.  Patient also developed mild thrombocytopenia which recovered with improvement of his infection.  Suspect on standing and abnormal lab findings will likely resolve with  "resolution of his infection.  However recommend repeat laboratory studies a few weeks with his primary care physician to ensure resolution of laboratory abnormalities.     Goals of Care Treatment Preferences:  Code Status: Full Code      SDOH Screening:  The patient was screened for utility difficulties, food insecurity, transport difficulties, housing insecurity, and interpersonal safety and there were no concerns identified this admission.     Consults:   Consults (From admission, onward)          Status Ordering Provider     Inpatient consult to Cardiology  Once        Provider:  Pedro Hansen MD    Completed WILLOW FUENTES     Inpatient consult to Infectious Diseases  Once        Provider:  Shari Jordan MD    Completed WILLOW FUENTES     Pharmacy to dose Vancomycin consult  Once        Provider:  (Not yet assigned)   Placed in "And" Linked Group    Acknowledged WILLOW FUENTES            Final Active Diagnoses:    Diagnosis Date Noted POA    PRINCIPAL PROBLEM:  Sepsis [A41.9] 02/19/2025 Yes    Hyponatremia [E87.1] 02/16/2017 Yes    Dyslipidemia [E78.5] 02/19/2025 Yes    Cellulitis of chest wall [L03.313] 02/21/2025 Yes      Problems Resolved During this Admission:    Diagnosis Date Noted Date Resolved POA    Hypophosphatemia [E83.39] 02/20/2025 02/22/2025 Yes       Discharged Condition: Stable    Disposition: Home or Self Care    Follow Up:   Follow-up Information       Joseph Santana MD. Schedule an appointment as soon as possible for a visit in 2 week(s).    Specialty: Internal Medicine  Contact information:  [address] [facility]  [address]  [facility] [id number] [phone]               Shari Jordan MD. Schedule an appointment as soon as possible for a visit in 1 week(s).    Specialty: Infectious Diseases  Contact information:  [address] [facility] [id number] [phone]                           Patient Instructions:      Diet Adult Regular     Notify " your health care provider if you experience any of the following:  temperature >100.4     Notify your health care provider if you experience any of the following:  persistent nausea and vomiting or diarrhea     Notify your health care provider if you experience any of the following:  severe uncontrolled pain     Notify your health care provider if you experience any of the following:  redness, tenderness, or signs of infection (pain, swelling, redness, odor or green/yellow discharge around incision site)     Notify your health care provider if you experience any of the following:  difficulty breathing or increased cough     Notify your health care provider if you experience any of the following:  severe persistent headache     Notify your health care provider if you experience any of the following:  worsening rash     Notify your health care provider if you experience any of the following:  persistent dizziness, light-headedness, or visual disturbances     Notify your health care provider if you experience any of the following:  increased confusion or weakness     Activity as tolerated          Medications:  Reconciled Home Medications:      Medication List        START taking these medications      acetaminophen 500 MG tablet  Commonly known as: TYLENOL  Take 2 tablets (1,000 mg total) by mouth every 8 (eight) hours as needed for Pain.     cefpodoxime 200 MG tablet  Commonly known as: VANTIN  Take 2 tablets (400 mg total) by mouth 2 (two) times daily. for 7 days     doxycycline 100 MG Cap  Commonly known as: VIBRAMYCIN  Take 1 capsule (100 mg total) by mouth every 12 (twelve) hours. for 7 days            CHANGE how you take these medications      loratadine 10 mg tablet  Commonly known as: CLARITIN  Take 1 tablet (10 mg total) by mouth once daily.  What changed:   how much to take  how to take this  when to take this            CONTINUE taking these medications      collagen, hydr (bovine) (bulk) 100 % Powd      fish,bora,flax oils-om3,6,9no1 400-400-400 mg Cap  Take by mouth.     glucosamine-chondroitin 500-400 mg tablet  Take 2 tablets by mouth once daily.     multivitamin per tablet  Commonly known as: THERAGRAN  Take 1 tablet by mouth once daily.     rosuvastatin 5 MG tablet  Commonly known as: CRESTOR  Take 1 tablet (5 mg total) by mouth once daily.            STOP taking these medications      AREXVY (PF) 120 mcg/0.5 mL Susr vaccine  Generic drug: RSVPreF3 antigen-AS01E (PF)     cephALEXin 250 MG capsule  Commonly known as: KEFLEX     PNEUMOVAX-23 25 mcg/0.5 mL vaccine  Generic drug: pneumococcal 23-paul ps              Indwelling Lines/Drains at time of discharge:   Lines/Drains/Airways       None                   Time spent on the discharge of patient: 35 minutes         Johny Iglesias MD  Department of Hospital Medicine  [facility] - Med [facility]urg ([address])

## 2025-02-23 NOTE — PLAN OF CARE
02/23/25 1004   Medicare Message   Important Message from Medicare regarding Discharge Appeal Rights Given to patient/caregiver;Explained to patient/caregiver;Signed/date by patient/caregiver   Date IMM was signed 02/23/25

## 2025-02-23 NOTE — PROGRESS NOTES
Pharmacokinetic Assessment Follow Up: IV Vancomycin    Vancomycin serum concentration assessment(s):    The trough level was drawn correctly and can be used to guide therapy at this time. The measurement is within the desired definitive target range of 10 to 20 mcg/mL.    Vancomycin Regimen Plan:    Continue regimen to Vancomycin 1750 mg IV every 12 hours with next serum trough concentration measured at 2030 prior to next dose on 2/24/25    Drug levels (last 3 results):  Recent Labs   Lab Result Units 02/21/25  0959 02/23/25  0850   Vancomycin-Trough ug/mL 4.6* 16.3       Pharmacy will continue to follow and monitor vancomycin.    Please contact pharmacy at extension [phone] for questions regarding this assessment.    Thank you for the consult,   Dali Sanders       Patient brief summary:  Hugh Bates is a 69 y.o. male initiated on antimicrobial therapy with IV Vancomycin for treatment of  bacteremia.    The patient's current regimen is 1750 mg every 12 hours.     Drug Allergies:   Review of patient's allergies indicates:  No Known Allergies    Actual Body Weight:   71.7 kg    Renal Function:   Estimated Creatinine Clearance: 101 mL/min (based on SCr of 0.7 mg/dL).,     Dialysis Method (if applicable):  N/A    CBC (last 72 hours):  Recent Labs   Lab Result Units 02/21/25  0354 02/22/25  0404   WBC K/uL 6.29 4.31   Hemoglobin g/dL 10.6* 10.6*   Hematocrit % 30.6* 31.1*   Platelets K/uL 137* 151   Gran % % 80.9* 59.8   Lymph % % 7.8* 17.9*   Mono % % 7.9 17.9*   Eosinophil % % 2.7 3.5   Basophil % % 0.2 0.2   Differential Method  Automated Automated       Metabolic Panel (last 72 hours):  Recent Labs   Lab Result Units 02/21/25  0354 02/22/25  0404   Sodium mmol/L 134* 135*   Potassium mmol/L 3.8 3.8   Chloride mmol/L 107 107   CO2 mmol/L 22* 22*   Glucose mg/dL 96 93   BUN mg/dL 14 13   Creatinine mg/dL 0.9 0.7   Albumin g/dL 2.2* 2.1*   Total Bilirubin mg/dL 0.5 0.4   Alkaline Phosphatase U/L 182* 219*    AST U/L 47* 54*   ALT U/L 54* 66*   Magnesium mg/dL 1.9 1.8   Phosphorus mg/dL 2.2* 2.7       Vancomycin Administrations:  vancomycin given in the last 96 hours                     vancomycin 1750 mg in 0.9% sodium chloride 500 mL IVPB (mg) 1,750 mg New Bag 02/23/25 0923     1,750 mg New Bag 02/22/25 2110     1,750 mg New Bag  0937     1,750 mg New Bag 02/21/25 2148    vancomycin 1,500 mg in 0.9% NaCl 250 mL IVPB (admixture device) (mg) 1,500 mg New Bag 02/21/25 1015      Restarted 02/20/25 1201     1,500 mg New Bag  1057    vancomycin 1750 mg in 0.9% sodium chloride 500 mL IVPB (mg) 1,750 mg New Bag 02/19/25 1101                    Microbiologic Results:  Microbiology Results (last 7 days)       Procedure Component Value Units Date/Time    Blood culture x two cultures. Draw prior to antibiotics. [[id number]] Collected: 02/19/25 0955    Order Status: Completed Specimen: Blood from Peripheral, Hand, Left Updated: 02/22/25 2012     Blood Culture, Routine No Growth to date      No Growth to date      No Growth to date      No Growth to date    Narrative:      Aerobic and anaerobic    Blood culture x two cultures. Draw prior to antibiotics. [[id number]] Collected: 02/19/25 0954    Order Status: Completed Specimen: Blood from Peripheral, Antecubital, Right Updated: 02/22/25 2012     Blood Culture, Routine No Growth to date      No Growth to date      No Growth to date      No Growth to date    Narrative:      Aerobic and anaerobic

## 2025-02-23 NOTE — PLAN OF CARE
[facility] ([address])  Discharge Final Note    Primary Care Provider: Joseph Santana MD    Expected Discharge Date: 2/23/2025    Final Discharge Note (most recent)       Final Note - 02/23/25 1004          Final Note    Assessment Type Final Discharge Note (P)      Anticipated Discharge Disposition Home or Self Care (P)      Hospital Resources/Appts/Education Provided Provided patient/caregiver with written discharge plan information;Appointments scheduled and added to AVS (P)         Post-Acute Status    Discharge Delays None known at this time (P)                      Important Message from Medicare  Important Message from Medicare regarding Discharge Appeal Rights: Given to patient/caregiver, Explained to patient/caregiver, Signed/date by patient/caregiver     Date IMM was signed: 02/23/25  Time IMM was signed: 1506    Contact Info       Joseph Santana MD   Specialty: Internal Medicine   Relationship: PCP - General  Hyperlipidemia Digital Medicine Responsible Provider    [address]  [address]  [facility] [address]   Phone: [phone]       Next Steps: Schedule an appointment as soon as possible for a visit in 2 week(s)    Shari Jordan MD   Specialty: Infectious Diseases    [facility] [facility] [address]   Phone: [phone]       Next Steps: Schedule an appointment as soon as possible for a visit in 1 week(s)          Case Management Final Discharge Note    Discharge Disposition: Home with family     New DME ordered / company name: None     Relevant SDOH / Transition of Care Barriers:  None     Person available to provide assistance at home when needed and their contact information: family    Scheduled followup appointment: Yes     Referrals placed: Yes     Transportation: Family/ wife         Patient and family educated on discharge services and updated on DC plan. Bedside RN notified, patient clear to discharge from Case Management Perspective.

## 2025-02-24 ENCOUNTER — NURSE TRIAGE (OUTPATIENT)
Dept: ADMINISTRATIVE | Facility: CLINIC | Age: 70
End: 2025-02-24
Payer: MEDICARE

## 2025-02-24 ENCOUNTER — PATIENT OUTREACH (OUTPATIENT)
Dept: ADMINISTRATIVE | Facility: CLINIC | Age: 70
End: 2025-02-24
Payer: MEDICARE

## 2025-02-24 ENCOUNTER — HOSPITAL ENCOUNTER (EMERGENCY)
Facility: OTHER | Age: 70
Discharge: HOME | End: 2025-02-25
Attending: EMERGENCY MEDICINE
Payer: MEDICARE

## 2025-02-24 ENCOUNTER — TELEPHONE (OUTPATIENT)
Dept: INTERNAL MEDICINE | Facility: CLINIC | Age: 70
End: 2025-02-24
Payer: MEDICARE

## 2025-02-24 ENCOUNTER — PATIENT MESSAGE (OUTPATIENT)
Dept: URGENT CARE | Facility: CLINIC | Age: 70
End: 2025-02-24
Payer: MEDICARE

## 2025-02-24 VITALS
RESPIRATION RATE: 18 BRPM | WEIGHT: 153 LBS | TEMPERATURE: 99 F | SYSTOLIC BLOOD PRESSURE: 161 MMHG | HEART RATE: 55 BPM | BODY MASS INDEX: 22.66 KG/M2 | HEIGHT: 69 IN | OXYGEN SATURATION: 99 % | DIASTOLIC BLOOD PRESSURE: 66 MMHG

## 2025-02-24 DIAGNOSIS — R21 RASH AND NONSPECIFIC SKIN ERUPTION: Primary | ICD-10-CM

## 2025-02-24 LAB
ALBUMIN SERPL BCP-MCNC: 2.4 G/DL (ref 3.5–5.2)
ALP SERPL-CCNC: 357 U/L (ref 40–150)
ALT SERPL W/O P-5'-P-CCNC: 101 U/L (ref 10–44)
ANION GAP SERPL CALC-SCNC: 7 MMOL/L (ref 8–16)
AST SERPL-CCNC: 91 U/L (ref 10–40)
BACTERIA BLD CULT: NORMAL
BACTERIA BLD CULT: NORMAL
BASOPHILS # BLD AUTO: 0.03 K/UL (ref 0–0.2)
BASOPHILS NFR BLD: 0.4 % (ref 0–1.9)
BILIRUB SERPL-MCNC: 0.4 MG/DL (ref 0.1–1)
BUN SERPL-MCNC: 13 MG/DL (ref 8–23)
CALCIUM SERPL-MCNC: 8.7 MG/DL (ref 8.7–10.5)
CHLORIDE SERPL-SCNC: 103 MMOL/L (ref 95–110)
CO2 SERPL-SCNC: 27 MMOL/L (ref 23–29)
CREAT SERPL-MCNC: 0.8 MG/DL (ref 0.5–1.4)
DIFFERENTIAL METHOD BLD: ABNORMAL
EOSINOPHIL # BLD AUTO: 0.2 K/UL (ref 0–0.5)
EOSINOPHIL NFR BLD: 3.4 % (ref 0–8)
ERYTHROCYTE [DISTWIDTH] IN BLOOD BY AUTOMATED COUNT: 13.1 % (ref 11.5–14.5)
EST. GFR  (NO RACE VARIABLE): >60 ML/MIN/1.73 M^2
GLUCOSE SERPL-MCNC: 101 MG/DL (ref 70–110)
HCT VFR BLD AUTO: 32.5 % (ref 40–54)
HGB BLD-MCNC: 10.9 G/DL (ref 14–18)
IMM GRANULOCYTES # BLD AUTO: 0.09 K/UL (ref 0–0.04)
IMM GRANULOCYTES NFR BLD AUTO: 1.3 % (ref 0–0.5)
LYMPHOCYTES # BLD AUTO: 1.3 K/UL (ref 1–4.8)
LYMPHOCYTES NFR BLD: 19.4 % (ref 18–48)
MCH RBC QN AUTO: 31.3 PG (ref 27–31)
MCHC RBC AUTO-ENTMCNC: 33.5 G/DL (ref 32–36)
MCV RBC AUTO: 93 FL (ref 82–98)
MONOCYTES # BLD AUTO: 0.7 K/UL (ref 0.3–1)
MONOCYTES NFR BLD: 10.7 % (ref 4–15)
NEUTROPHILS # BLD AUTO: 4.4 K/UL (ref 1.8–7.7)
NEUTROPHILS NFR BLD: 64.8 % (ref 38–73)
NRBC BLD-RTO: 0 /100 WBC
PLATELET # BLD AUTO: 286 K/UL (ref 150–450)
PMV BLD AUTO: 9.6 FL (ref 9.2–12.9)
POTASSIUM SERPL-SCNC: 4.1 MMOL/L (ref 3.5–5.1)
PROT SERPL-MCNC: 5.9 G/DL (ref 6–8.4)
RBC # BLD AUTO: 3.48 M/UL (ref 4.6–6.2)
SODIUM SERPL-SCNC: 137 MMOL/L (ref 136–145)
WBC # BLD AUTO: 6.8 K/UL (ref 3.9–12.7)

## 2025-02-24 PROCEDURE — 80053 COMPREHEN METABOLIC PANEL: CPT | Mod: HCNC | Performed by: PHYSICIAN ASSISTANT

## 2025-02-24 PROCEDURE — 99283 EMERGENCY DEPT VISIT LOW MDM: CPT | Mod: HCNC

## 2025-02-24 PROCEDURE — 85025 COMPLETE CBC W/AUTO DIFF WBC: CPT | Mod: HCNC | Performed by: PHYSICIAN ASSISTANT

## 2025-02-24 NOTE — TELEPHONE ENCOUNTER
Please contact pt in regard to lab work before [facility] on 3/11/25 at 10am, Pt was discharged on 2/23/25 with cellulitis.   Thank you,  Nayeli Topete RN  C3

## 2025-02-24 NOTE — PROGRESS NOTES
C3 nurse spoke with Hugh Bates for a TCC post hospital discharge follow up call. The patient has a scheduled [facility]OS[facility] appointment with Joseph Santana MD   on 3/11/25 @ 10am.

## 2025-02-25 ENCOUNTER — OFFICE VISIT (OUTPATIENT)
Dept: INTERNAL MEDICINE | Facility: CLINIC | Age: 70
End: 2025-02-25
Payer: MEDICARE

## 2025-02-25 VITALS
BODY MASS INDEX: 25.24 KG/M2 | SYSTOLIC BLOOD PRESSURE: 120 MMHG | WEIGHT: 170.44 LBS | OXYGEN SATURATION: 96 % | HEART RATE: 50 BPM | DIASTOLIC BLOOD PRESSURE: 60 MMHG | HEIGHT: 69 IN

## 2025-02-25 DIAGNOSIS — L03.313 CELLULITIS OF CHEST WALL: Primary | ICD-10-CM

## 2025-02-25 PROCEDURE — 99999 PR PBB SHADOW E&M-EST. PATIENT-LVL III: CPT | Mod: PBBFAC,HCNC,, | Performed by: COUNSELOR

## 2025-02-25 PROCEDURE — 25000003 PHARM REV CODE 250: Mod: HCNC | Performed by: EMERGENCY MEDICINE

## 2025-02-25 RX ORDER — DIPHENHYDRAMINE HCL 25 MG
25 CAPSULE ORAL
Status: COMPLETED | OUTPATIENT
Start: 2025-02-25 | End: 2025-02-25

## 2025-02-25 RX ORDER — SULFAMETHOXAZOLE AND TRIMETHOPRIM 800; 160 MG/1; MG/1
1 TABLET ORAL 2 TIMES DAILY
Qty: 10 TABLET | Refills: 0 | Status: SHIPPED | OUTPATIENT
Start: 2025-02-25 | End: 2025-03-02

## 2025-02-25 RX ADMIN — DIPHENHYDRAMINE HYDROCHLORIDE 25 MG: 25 CAPSULE ORAL at 12:02

## 2025-02-25 NOTE — PROGRESS NOTES
Subjective:       Patient ID: Hugh Bates is a 69 y.o. male with history of lumbar DDD, hypercholesterolemia, cellulitis, sepsis, GERD, hyponatremia    Chief Complaint: Cellulitis of chest wall [L03.313]    Patient is known to me, PCP is Esther. Here today for the following:    History of Present Illness    CHIEF COMPLAINT:  Patient presents today for follow up of skin infection after recent emergency room visit    SKIN INFECTION:  Saw me previously 2/19/25 for follow up of rash after cutting right hand on unknown material. At that time he was on Keflex for supposed cellulitis that had radiated to his right axilla. At presentation with me, it had continued to progress to include his right chest wall and abdomen. He was admitted from ED to the hospital on sepsis protocol. Found to be hypovolemic hypotonic hyponatremic and given broad-spectrum IV antibiotics with fluids. Cellulitis improved but some induration was noted on right upper chest wall. US showed no drainable fluid colleciton. ID consulted and they switched to vancomycin and ceftriaxone. Cellulitis continued improving with negative blood cultures to date. He was given cefpodoxime, doxycycline on discharge 2/23/25.     Came back to ED 2/24/25 with a new rash after completing 1st dose of oral antibiotics. Described as small red bumps with occasional blisters and drainage. No pain, itching, fevers, worsening of initial cellulitis. Induration noted in right axilla 2-3 in diameter. US showed no focal fluid collection to suggest abscess. Started benadryl and D/C doxy starting bactrim.    Today, he reports improvement in rash with decreasing pain since switching antibiotics and starting benadryl, noting previous severe pain has reduced to an achy soreness. The rash worsens at night with associated hardening. He denies fever and itching. Currently unable to maintain his usual active lifestyle of jogging and sprints due to pain severity.    Has follow up with  "derm tomorrow and ID, PCP in 03/25.    GASTROINTESTINAL HISTORY:  He reports constipation since recent hospitalization. He is usually regular. He has a history of GI issues since childhood, with gastroenterologists describing his colon as "contorted" and "too large for body size." Currently having bowel movements every couple of days with minimal output taking fiber supplements. He denies abdominal pain.    ROS:  General: -fever, -chills, -fatigue, -weight gain, -weight loss  Eyes: -vision changes, -redness, -discharge  ENT: -ear pain, -nasal congestion, -sore throat  Cardiovascular: -chest pain, -palpitations, -lower extremity edema  Respiratory: -cough, -shortness of breath  Gastrointestinal: -abdominal pain, -nausea, -vomiting, -diarrhea, +constipation, -blood in stool  Genitourinary: -dysuria, -hematuria, -frequency  Musculoskeletal: -joint pain, -muscle pain  Skin: +rash, -lesion, -itching  Neurological: -headache, -dizziness, -numbness, -tingling  Psychiatric: -anxiety, -depression, -sleep difficulty            Current Outpatient Medications   Medication Instructions    acetaminophen (TYLENOL) 1,000 mg, Oral, Every 8 hours PRN    cefpodoxime (VANTIN) 400 mg, Oral, 2 times daily    collagen, hydrolysate, bovine, (COLLAGEN, HYDR, BOVINE,, BULK,) 100 % Powd No dose, route, or frequency recorded.    fish,bora,flax oils-om3,6,9no1 400-400-400 mg Cap Take by mouth.    glucosamine-chondroitin 500-400 mg tablet 2 tablets, Daily    loratadine (CLARITIN) 10 mg, Oral, Daily    multivitamin (THERAGRAN) per tablet 1 tablet, Daily    rosuvastatin (CRESTOR) 5 mg, Oral, Daily    sulfamethoxazole-trimethoprim 800-160mg (BACTRIM DS) 800-160 mg Tab 1 tablet, Oral, 2 times daily     Objective:      Vitals:    02/25/25 1107   BP: 120/60   Pulse: (!) 50   SpO2: 96%   Weight: 77.3 kg (170 lb 6.7 oz)   Height: 5' 9" (1.753 m)   PainSc:   5   PainLoc: Arm     Body mass index is 25.17 kg/m².    Physical Exam  Vitals reviewed. "   Constitutional:       General: He is not in acute distress.     Appearance: Normal appearance. He is not ill-appearing, toxic-appearing or diaphoretic.   HENT:      Head: Normocephalic and atraumatic.   Eyes:      Extraocular Movements: Extraocular movements intact.      Conjunctiva/sclera: Conjunctivae normal.   Musculoskeletal:      Right lower leg: Edema present.      Comments: Slight nonpitting edema of right lower extremity compared to left.   Skin:     General: Skin is warm and dry.      Findings: Erythema and rash present.      Comments: Skin induration noted, similar to previous night per patient photo. It is red, nonfluctuant, with no lymphadenopathy appreciated surrounding. There is some warmth to touch, but it is largely nontender. There are some erythematous macules noted across abdomen and on the back, nonpruritic and nontender. No purulent discharge noted in finger or induration.   Neurological:      Mental Status: He is alert.           Assessment:       1. Cellulitis of chest wall        IMPRESSION:  - Assessed cellulitis/soft tissue infection: initially worsened but now improving on current antibiotic regimen  - Considered possibility of drug reaction, particularly to cephalosporin class antibiotics  - Evaluated for sepsis, noting patient was started on sepsis protocol in hospital  - Monitored for potential development of C. diff infection due to broad-spectrum antibiotic use  - Assessed constipation as likely due to dietary changes and decreased activity while hospitalized    Plan:   Cellulitis of chest wall  - Noted patient's report of fatigue and recent emergency room visit.  - Observed improvement in the rash, with reduced pain and size.  - Ultrasound showed no fluid, and small pink bumps were identified as part of the rash reaction.  - Assessed the condition as likely a soft tissue skin infection or cellulitis that is now consolidating, with the possibility of a drug  reaction.    CELLULITIS/SOFT TISSUE INFECTION:  - Explained that the current skin condition is likely due to normal skin bacteria entering a wound and proliferating.  - Continued current antibiotic regimen started in ED   - Instructed the patient to keep the area clean, wash hands frequently, and take periodic photos to track progression.  - No fever observed  - Will continue to monitor for potential complications or worsening.    CONSTIPATION:  - Started fiber supplements as needed for constipation, which began in the hospital with bowel movements occurring once every 2 days.  - Patient reports no abdominal pain and is passing gas  - Assessed that constipation is likely due to dietary changes and reduced physical activity in the hospital.  - Recommend continuing fiber supplements, increasing water intake, and walking.  - Avoid Miralax or enema's for now.    ANTIBIOTIC-RELATED CONCERNS:  - Monitored for potential diarrhea as a side effect of antibiotics.  - Instructed the patient to contact the office immediately if diarrhea develops, due to risk of C. diff infection.    SEPSIS PREVENTION:  - Discussed potential for sepsis to cause organ damage and failure if not treated promptly.  - Not currently concern, but currently monitoring for any signs.    ACTIVITY RECOMMENDATIONS:  - Patient to continue walking as tolerated to promote activity and bowel function.  - Advised to avoid cycling until arm condition improves.  - Recommend drinking plenty of water.    ALLERGIES/ITCHING:  - Explained difference between 1st-generation (e.g., Benadryl) and 2nd-generation (e.g., Zyrtec) antihistamines.  - Started Zyrtec for daytime use and Benadryl for nighttime use if itching occurs.    DERMATOLOGY REFERRAL:  - Scheduled follow-up with dermatology for tomorrow.    FOLLOW UP:  - Advised the patient to contact the office if swelling in extremities worsens, shortness of breath develops, or if the rash worsens, spreads, or new skin  findings appear.  - Follow up with PCP on the 11th (in 2 weeks) as scheduled.         This note was generated with the assistance of ambient listening technology. Verbal consent was obtained by the patient and accompanying visitor(s) for the recording of patient appointment to facilitate this note. I attest to having reviewed and edited the generated note for accuracy, though some syntax or spelling errors may persist. Please contact the author of this note for any clarification.    Tomer Bejarano PA-C    2/25/2025        Answers submitted by the patient for this visit:  Rash Questionnaire (Submitted on 2/24/2025)  Chief Complaint: Rash  Onset: today  Progression since onset: gradually worsening  Affected locations: chest, back, abdomen  Characteristics: blistering  Exposed to: unknown  anorexia: No  congestion: No  cough: Yes  diarrhea: No  eye pain: No  facial edema: No  fatigue: No  fever: No  joint pain: No  nail changes: No  rhinorrhea: Yes  shortness of breath: No  sore throat: No  vomiting: No  asthma: No  allergies: Yes  eczema: No  varicella: Yes

## 2025-02-25 NOTE — ED PROVIDER NOTES
Encounter Date: 2/24/2025       History     Chief Complaint   Patient presents with    Rash     + patient presents with rash noted all over body, mainly abdomen and back, first noticed this afternoon. Pt. Reports that he was worked up for an infection to his hand previously, was told to come get seen by the triage nurse.      61-year-old male with history of high cholesterol presents for evaluation new rash.  Patient was just admitted here from 2/19-2/23 for right chest wall cellulitis, was treated with IV antibiotics with improvement, discharged on oral antibiotics yesterday.  Patient took his 1st dose of his oral antibiotics last night, was otherwise doing well and denies any fevers.  He noticed a new rash over his abdomen and back today, described as small red bumps with occasional blisters and drainage.  He denies any associated pain or itching, no recurrent fevers or worsening of initial cellulitis.  He denies any history of similar previous episodes, no other new exposures      Review of patient's allergies indicates:  No Known Allergies  Past Medical History:   Diagnosis Date    Basal cell carcinoma     Urinary tract infection      Past Surgical History:   Procedure Laterality Date    CATARACT EXTRACTION W/  INTRAOCULAR LENS IMPLANT Left 10/7/2024    Procedure: EXTRACTION, CATARACT, WITH IOL INSERTION;  Surgeon: Sepideh Cisneros MD;  Location: [facility] OR;  Service: Ophthalmology;  Laterality: Left;  ORA,     CATARACT EXTRACTION W/  INTRAOCULAR LENS IMPLANT Right 10/28/2024    Procedure: EXTRACTION, CATARACT, WITH IOL INSERTION;  Surgeon: Sepideh Cisneros MD;  Location: [facility] OR;  Service: Ophthalmology;  Laterality: Right;  JANINA     COLONOSCOPY N/A 9/30/2016    Procedure: COLONOSCOPY;  Surgeon: George Virgen MD;  Location: [facility] ENDO ([address] [place] [address]);  Service: Colon and Rectal;  Laterality: N/A;    COLONOSCOPY      FRACTURE SURGERY  1974    broken thumb    REPAIR OF RETINAL DETACHMENT WITH VITRECTOMY Left 2/7/2024     Procedure: REPAIR, RETINAL DETACHMENT, WITH VITRECTOMY;  Surgeon: JOHN Ortiz MD;  Location: [facility] OR [address] [place] [address];  Service: Ophthalmology;  Laterality: Left;     Family History   Problem Relation Name Age of Onset    Colon polyps Mother Jimmy Bates     Squamous cell carcinoma Mother Jimmy Bates     Hearing loss Mother Jimmy Bates         Bad hearing, but will not get hearing aid    Cataracts Mother Jimmy Bates     Cancer Father Cristian Bates Jr.          in     Early death Father Cristian Bates Jr.         was 44 in , [employer]    No Known Problems Sister      No Known Problems Sister      No Known Problems Sister      No Known Problems Brother      No Known Problems Maternal Aunt      No Known Problems Maternal Uncle      No Known Problems Paternal Aunt      No Known Problems Paternal Uncle      Arthritis Maternal Grandmother Michaela Wiseman     No Known Problems Maternal Grandfather      No Known Problems Paternal Grandmother      No Known Problems Paternal Grandfather      No Known Problems Other      Diabetes Neg Hx      Melanoma Neg Hx       Social History[1]  Review of Systems   Constitutional:  Negative for fever.   HENT:  Negative for congestion.    Eyes:  Negative for redness.   Respiratory:  Negative for shortness of breath.    Cardiovascular:  Negative for chest pain.   Gastrointestinal:  Negative for abdominal pain.   Genitourinary:  Negative for dysuria.   Skin:  Positive for rash.   Neurological:  Negative for headaches.   Psychiatric/Behavioral:  Negative for confusion.        Physical Exam     Initial Vitals [25 2202]   BP Pulse Resp Temp SpO2   (!) 161/66 (!) 55 18 98.7 °F (37.1 °C) 99 %      MAP       --         Physical Exam    Nursing note and vitals reviewed.  Constitutional: He appears well-developed and well-nourished. He is not diaphoretic. No distress.   HENT:   Head: Normocephalic and atraumatic.   Eyes: Conjunctivae are normal. No scleral  icterus.   Neck: Neck supple.   Cardiovascular:  Normal rate, regular rhythm, normal heart sounds and intact distal pulses.           No murmur heard.  Pulmonary/Chest: Breath sounds normal. No respiratory distress. He has no wheezes. He has no rhonchi. He has no rales.   Abdominal: Abdomen is soft. There is no abdominal tenderness. There is no rebound and no guarding.   Musculoskeletal:         General: No edema.      Cervical back: Neck supple.     Neurological: He is alert and oriented to person, place, and time.   Skin: Skin is warm and dry.   Right axilla with area of erythema, induration, warmth than mild tenderness approximately 2-3 inches diameter.  Small maculopapular erythematous lesions over lower back and abdomen, no vesicles or tenderness.   Psychiatric: He has a normal mood and affect.         ED Course   Procedures  Labs Reviewed   CBC W/ AUTO DIFFERENTIAL - Abnormal       Result Value    WBC 6.80      RBC 3.48 (*)     Hemoglobin 10.9 (*)     Hematocrit 32.5 (*)     MCV 93      MCH 31.3 (*)     MCHC 33.5      RDW 13.1      Platelets 286      MPV 9.6      Immature Granulocytes 1.3 (*)     Gran # (ANC) 4.4      Immature Grans (Abs) 0.09 (*)     Lymph # 1.3      Mono # 0.7      Eos # 0.2      Baso # 0.03      nRBC 0      Gran % 64.8      Lymph % 19.4      Mono % 10.7      Eosinophil % 3.4      Basophil % 0.4      Differential Method Automated     COMPREHENSIVE METABOLIC PANEL - Abnormal    Sodium 137      Potassium 4.1      Chloride 103      CO2 27      Glucose 101      BUN 13      Creatinine 0.8      Calcium 8.7      Total Protein 5.9 (*)     Albumin 2.4 (*)     Total Bilirubin 0.4      Alkaline Phosphatase 357 (*)     AST 91 (*)      (*)     eGFR >60      Anion Gap 7 (*)           Imaging Results    None          Medications   diphenhydrAMINE capsule 25 mg (25 mg Oral Given 2/25/25 0015)     Medical Decision Making      61-year-old male with history of high cholesterol presents for evaluation of  new rash.  Patient was just admitted here from 02/19 to 2/23 for right chest wall cellulitis that initially started with a wound on his right knuckle that progressed to right axillary erythema that spread to his whole chest wall, failed outpatient Keflex.  He was initially treated with broad-spectrum antibiotics that was tailor to IV vanc/Rocephin by ID, and after improvement he was discharged on doxycycline and Vantin yesterday.  He took his 1st dose of these medications last night, and today noticed a new rash to his lower abdomen and back.  Per wife, the rash had some areas of blisters and clear drainage, but his previous area of cellulitis is continuing to improve, denies any fevers or other new symptoms.  No other new exposures since discharge yesterday.  On exam patient afebrile and well-appearing, with residual area of erythema/induration/tenderness to his right axilla, but no significant erythema over previous area marked on initial cellulitis presentation.  He also has new small circular maculopapular erythematous lesions over lower abdomen and lower back; no vesicles or pustules seen, no active drainage or confluent erythema in these areas.  A few lesions appear to have ruptured but no active drainage.      Unclear etiology of new rash, differential includes contact dermatitis, allergic reaction to antibiotic, erythema multiform.  I would suspect doxycycline more than Vantin since he has been on multiple cephalosporin since onset of these symptoms with no adverse effect.  Bedside ultrasound done of residual right axillary indurated area still with no focal fluid collection to suggest abscess, as seen on inpatient ultrasound on 02/21.  Blood cultures from his hospitalization were all negative.  No sign of spreading cellulitis or new lesions accounting for worsening infection especially given lack of fever or pain, lesions not consistent with pustules or shingles.  I discussed with patient readmission in  transfer to [facility] for Dermatology consult and ID re-evaluation, but since he is otherwise feeling better and no systemic symptoms recurrent, with previous cellulitis area continuing to improve, he prefers to continue outpatient treatment which I think is reasonable.  Will start Benadryl for possible allergic reaction or side effect to antibiotic, and change doxycycline to Bactrim to continue MRSA coverage; he already took his dose of doxycycline and Vantin tonight so will start Bactrim tomorrow.  Patient has PCP appointment this morning for re-evaluation, and patient understands need for dermatology and ID follow up, return precautions for any worsening area of these new lesions or recurrent fever.      Risk  OTC drugs.  Prescription drug management.                                      Clinical Impression:  Final diagnoses:  [R21] Rash and nonspecific skin eruption (Primary)          ED Disposition Condition    Discharge Stable          ED Prescriptions       Medication Sig Dispense Start Date End Date Auth. Provider    sulfamethoxazole-trimethoprim 800-160mg (BACTRIM DS) 800-160 mg Tab Take 1 tablet by mouth 2 (two) times daily. for 5 days 10 tablet 2/25/2025 3/2/2025 Obinna Mcmullen MD          Follow-up Information       Follow up With Specialties Details Why Contact Info    Joseph Santana MD Internal Medicine Go today  [address] [place] [id number]  [phone]      [facility] DERMATOLOGY Dermatology Schedule an appointment as soon as possible for a visit in 3 days  [address] [place] [id number]  [phone]               Obinna Mcmullen MD  02/25/25 0146         [1]   Social History  Tobacco Use    Smoking status: Never    Smokeless tobacco: Never   Substance Use Topics    Alcohol use: Yes     Alcohol/week: 7.0 standard drinks of alcohol     Types: 1 Glasses of wine, 6 Cans of beer per week    Drug use: Never        Obinna Mcmullen,  MD  02/25/25 0147

## 2025-02-25 NOTE — FIRST PROVIDER EVALUATION
Emergency Department TeleTriage Encounter Note      CHIEF COMPLAINT    Chief Complaint   Patient presents with    Rash     + patient presents with rash noted all over body, mainly abdomen and back, first noticed this afternoon. Pt. Reports that he was worked up for an infection to his hand previously, was told to come get seen by the triage nurse.        VITAL SIGNS   Initial Vitals [02/24/25 2202]   BP Pulse Resp Temp SpO2   (!) 161/66 (!) 55 18 98.7 °F (37.1 °C) 99 %      MAP       --            ALLERGIES    Review of patient's allergies indicates:  No Known Allergies    PROVIDER TRIAGE NOTE  68 yo M recently discharged from the hospital presents tonight with a rash.  Advised to come in for evaluation per on-call team.. vitals normal. No distress noted.       ORDERS  Labs Reviewed - No data to display    ED Orders (720h ago, onward)      None              Virtual Visit Note: The provider triage portion of this emergency department evaluation and documentation was performed via [email or web address], a HIPAA-compliant telemedicine application, in concert with a tele-presenter in the room. A face to face patient evaluation with one of my colleagues will occur once the patient is placed in an emergency department room.      DISCLAIMER: This note was prepared with [employer] voice recognition transcription software. Garbled syntax, mangled pronouns, and other bizarre constructions may be attributed to that software system.

## 2025-02-25 NOTE — TELEPHONE ENCOUNTER
"Wife, Sabrina, states pt D/c'd yesterday following stay for sepsis.   Chest, abdomen, and lower back rash, that look like "pin pricks", with some fluid filled sacs present. First noticed this afternoon.  No fever.  Currently taking vantin and vibramycin, began these last PM at approx 1900.   Care advice provided per protocol, with recommendation to be seen within 4 hrs of call. VV offered and accepted and walked through how to connect. Both pt and wife VU.   Reason for Disposition   Large or small blisters on skin (i.e., fluid filled bubbles or sacs)    Additional Information   Negative: [1] Life-threatening reaction (anaphylaxis) in the past to the same drug AND [2] < 2 hours since exposure   Negative: Difficulty breathing or wheezing   Negative: [1] Hoarseness or cough AND [2] started soon after 1st dose of drug   Negative: [1] Swollen tongue AND [2] started soon after 1st dose of drug   Negative: [1] Purple or blood-colored rash (spots or dots) AND [2] fever   Negative: Sounds like a life-threatening emergency to the triager   Negative: Swollen tongue   Negative: [1] Widespread hives AND [2] onset < 2 hours of exposure to 1st dose of drug   Negative: Fever   Negative: Patient sounds very sick or weak to the triager   Negative: [1] Purple or blood-colored rash (spots or dots) AND [2] no fever AND [3] sounds well to triager   Negative: [1] Taking new prescription medication AND [2] rash within 4 hours of 1st dose    Protocols used: Rash - Widespread On Drugs-A-AH    "

## 2025-02-25 NOTE — TELEPHONE ENCOUNTER
Caller states that pt was advised to see provider within 4 hours. Caller was advised ED or [facility]. Caller states that [facility] provider declined to see pt due to complaint of rash s/p sepsis admission in the hospital. Caller would like to know if pt can wait to see provider in the AM. Caller advised per previous triage dispo to see provider within 4 hours.  Pt advised to see provider within 4 hours per protocol and verbalized understanding.     Reason for Disposition   [1] Follow-up call to recent contact AND [2] information only call, no triage required    Additional Information   Negative: Triager needs further essential information from caller in order to complete triage   Negative: [1] Caller requesting NON-URGENT health information AND [2] PCP's office is the best resource   Negative: Requesting regular office appointment   Negative: Health information question, no triage required and triager able to answer question   Negative: General information question, no triage required and triager able to answer question   Negative: Question about upcoming scheduled surgery, procedure or test, no triage required, and triager able to answer question   Negative: [1] Caller is not with the adult (patient) AND [2] probable NON-URGENT symptoms    Protocols used: Information Only Call - No Triage-A-

## 2025-02-26 ENCOUNTER — OFFICE VISIT (OUTPATIENT)
Dept: OPHTHALMOLOGY | Facility: CLINIC | Age: 70
End: 2025-02-26
Payer: MEDICARE

## 2025-02-26 ENCOUNTER — OFFICE VISIT (OUTPATIENT)
Dept: DERMATOLOGY | Facility: CLINIC | Age: 70
End: 2025-02-26
Attending: EMERGENCY MEDICINE
Payer: MEDICARE

## 2025-02-26 DIAGNOSIS — L03.90 CELLULITIS, UNSPECIFIED CELLULITIS SITE: Primary | ICD-10-CM

## 2025-02-26 DIAGNOSIS — R21 RASH AND NONSPECIFIC SKIN ERUPTION: ICD-10-CM

## 2025-02-26 DIAGNOSIS — Z98.890 POST-OPERATIVE STATE: Primary | ICD-10-CM

## 2025-02-26 PROCEDURE — 1159F MED LIST DOCD IN RCRD: CPT | Mod: HCNC,CPTII,S$GLB, | Performed by: STUDENT IN AN ORGANIZED HEALTH CARE EDUCATION/TRAINING PROGRAM

## 2025-02-26 PROCEDURE — 1111F DSCHRG MED/CURRENT MED MERGE: CPT | Mod: HCNC,CPTII,S$GLB, | Performed by: STUDENT IN AN ORGANIZED HEALTH CARE EDUCATION/TRAINING PROGRAM

## 2025-02-26 PROCEDURE — 1160F RVW MEDS BY RX/DR IN RCRD: CPT | Mod: HCNC,CPTII,S$GLB, | Performed by: STUDENT IN AN ORGANIZED HEALTH CARE EDUCATION/TRAINING PROGRAM

## 2025-02-26 PROCEDURE — 3288F FALL RISK ASSESSMENT DOCD: CPT | Mod: HCNC,CPTII,S$GLB, | Performed by: STUDENT IN AN ORGANIZED HEALTH CARE EDUCATION/TRAINING PROGRAM

## 2025-02-26 PROCEDURE — 3288F FALL RISK ASSESSMENT DOCD: CPT | Mod: CPTII,S$GLB,, | Performed by: OPHTHALMOLOGY

## 2025-02-26 PROCEDURE — 1160F RVW MEDS BY RX/DR IN RCRD: CPT | Mod: CPTII,S$GLB,, | Performed by: OPHTHALMOLOGY

## 2025-02-26 PROCEDURE — 1125F AMNT PAIN NOTED PAIN PRSNT: CPT | Mod: HCNC,CPTII,S$GLB, | Performed by: STUDENT IN AN ORGANIZED HEALTH CARE EDUCATION/TRAINING PROGRAM

## 2025-02-26 PROCEDURE — 1126F AMNT PAIN NOTED NONE PRSNT: CPT | Mod: CPTII,S$GLB,, | Performed by: OPHTHALMOLOGY

## 2025-02-26 PROCEDURE — 1159F MED LIST DOCD IN RCRD: CPT | Mod: CPTII,S$GLB,, | Performed by: OPHTHALMOLOGY

## 2025-02-26 PROCEDURE — 1101F PT FALLS ASSESS-DOCD LE1/YR: CPT | Mod: HCNC,CPTII,S$GLB, | Performed by: STUDENT IN AN ORGANIZED HEALTH CARE EDUCATION/TRAINING PROGRAM

## 2025-02-26 PROCEDURE — 99024 POSTOP FOLLOW-UP VISIT: CPT | Mod: S$GLB,,, | Performed by: OPHTHALMOLOGY

## 2025-02-26 PROCEDURE — 99999 PR PBB SHADOW E&M-EST. PATIENT-LVL III: CPT | Mod: PBBFAC,,, | Performed by: OPHTHALMOLOGY

## 2025-02-26 PROCEDURE — 99999 PR PBB SHADOW E&M-EST. PATIENT-LVL II: CPT | Mod: PBBFAC,HCNC,, | Performed by: STUDENT IN AN ORGANIZED HEALTH CARE EDUCATION/TRAINING PROGRAM

## 2025-02-26 PROCEDURE — 99213 OFFICE O/P EST LOW 20 MIN: CPT | Mod: HCNC,S$GLB,, | Performed by: STUDENT IN AN ORGANIZED HEALTH CARE EDUCATION/TRAINING PROGRAM

## 2025-02-26 PROCEDURE — 1101F PT FALLS ASSESS-DOCD LE1/YR: CPT | Mod: CPTII,S$GLB,, | Performed by: OPHTHALMOLOGY

## 2025-02-26 RX ORDER — FLUCONAZOLE 200 MG/1
TABLET ORAL
Qty: 4 TABLET | Refills: 0 | Status: SHIPPED | OUTPATIENT
Start: 2025-02-26

## 2025-02-26 NOTE — PROGRESS NOTES
Subjective:       Patient ID: Hugh Bates is a 69 y.o. male.    Chief Complaint: No chief complaint on file.    HPI    S/ P YAG OS 2/5/25  [facility] OS good since laser.  Eye meds: A-T PF PRN OU  POHx:   1. YAG OS 2/5/25  2. 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 PCIOL OU     Last edited by Cari Vo on 2/26/2025  1:34 PM.             Assessment:       1. Post-operative state        Plan:       S/p YAG CAP OS- Doing well.      RTC Dr Fuller in 6 mos.

## 2025-02-26 NOTE — PROGRESS NOTES
Subjective:      Patient ID:  Hugh Bates is a 69 y.o. male who presents for   Chief Complaint   Patient presents with    Rash     Pt here for rash    Pt has had red bumps on abdomen and back since recent hospital stay due to sepsis.     Pt went to ED Monday evening and had antibiotic (doxy) changed to alternative (unsure of kind)    Pt feels it has improved but still wanted to be looked at by derm. No itching or pain.        Taking zyrtec and benadryl which has helped      Focal site of cellulitis that led to sepsis in right axilla was ultrasounded again at ER 2 days ago with no fluid collection, on bactrim    Review of Systems    Objective:   Physical Exam   Constitutional: He appears well-developed and well-nourished.   Neurological: He is alert and oriented to person, place, and time.   Psychiatric: He has a normal mood and affect.   Skin:   Areas Examined (abnormalities noted in diagram):   Abdomen Inspection Performed  Back Inspection Performed            Diagram Legend     Erythematous scaling macule/papule c/w actinic keratosis       Vascular papule c/w angioma      Pigmented verrucoid papule/plaque c/w seborrheic keratosis      Yellow umbilicated papule c/w sebaceous hyperplasia      Irregularly shaped tan macule c/w lentigo     1-2 mm smooth white papules consistent with Milia      Movable subcutaneous cyst with punctum c/w epidermal inclusion cyst      Subcutaneous movable cyst c/w pilar cyst      Firm pink to brown papule c/w dermatofibroma      Pedunculated fleshy papule(s) c/w skin tag(s)      Evenly pigmented macule c/w junctional nevus     Mildly variegated pigmented, slightly irregular-bordered macule c/w mildly atypical nevus      Flesh colored to evenly pigmented papule c/w intradermal nevus       Pink pearly papule/plaque c/w basal cell carcinoma      Erythematous hyperkeratotic cursted plaque c/w SCC      Surgical scar with no sign of skin cancer recurrence      Open and closed  comedones      Inflammatory papules and pustules      Verrucoid papule consistent consistent with wart     Erythematous eczematous patches and plaques     Dystrophic onycholytic nail with subungual debris c/w onychomycosis     Umbilicated papule    Erythematous-base heme-crusted tan verrucoid plaque consistent with inflamed seborrheic keratosis     Erythematous Silvery Scaling Plaque c/w Psoriasis     See annotation      Assessment / Plan:        Cellulitis, unspecified cellulitis site  -right axilla. S/p admission with IV abx. Now discharged on PO. No palpable fluctuance today. Ultrasounded 2 days ago with collection requiring I&D. C/w po abx. Counseled that if it worsens then return to ED    Rash and nonspecific skin eruption  -     fluconazole (DIFLUCAN) 200 MG Tab; Take BIW x 2 weeks  Dispense: 4 tablet; Refill: 0    Monomorphous urticarial papules on trunk I/s/o recent sepsis and hospitalization. Improving and asymptoamtic on zyrtec 10 mg qd. Ddx pityrosporum folliculitis, miliaria  - c/w zyrtec qd  - diflucan as above  - if miliaria, will resolve spontaneously

## 2025-02-28 ENCOUNTER — LAB VISIT (OUTPATIENT)
Dept: LAB | Facility: OTHER | Age: 70
End: 2025-02-28
Payer: MEDICARE

## 2025-02-28 ENCOUNTER — PATIENT MESSAGE (OUTPATIENT)
Dept: INTERNAL MEDICINE | Facility: CLINIC | Age: 70
End: 2025-02-28

## 2025-02-28 ENCOUNTER — RESULTS FOLLOW-UP (OUTPATIENT)
Dept: INTERNAL MEDICINE | Facility: CLINIC | Age: 70
End: 2025-02-28

## 2025-02-28 ENCOUNTER — OFFICE VISIT (OUTPATIENT)
Dept: INTERNAL MEDICINE | Facility: CLINIC | Age: 70
End: 2025-02-28
Payer: MEDICARE

## 2025-02-28 ENCOUNTER — TELEPHONE (OUTPATIENT)
Dept: INTERNAL MEDICINE | Facility: CLINIC | Age: 70
End: 2025-02-28
Payer: MEDICARE

## 2025-02-28 VITALS
HEIGHT: 69 IN | HEART RATE: 54 BPM | DIASTOLIC BLOOD PRESSURE: 62 MMHG | WEIGHT: 156.44 LBS | SYSTOLIC BLOOD PRESSURE: 110 MMHG | BODY MASS INDEX: 23.17 KG/M2 | OXYGEN SATURATION: 96 %

## 2025-02-28 DIAGNOSIS — R79.89 ELEVATED LFTS: ICD-10-CM

## 2025-02-28 DIAGNOSIS — L03.313 CELLULITIS OF CHEST WALL: ICD-10-CM

## 2025-02-28 DIAGNOSIS — L02.91 ABSCESS: Primary | ICD-10-CM

## 2025-02-28 LAB
ALBUMIN SERPL BCP-MCNC: 2.8 G/DL (ref 3.5–5.2)
ALP SERPL-CCNC: 382 U/L (ref 40–150)
ALT SERPL W/O P-5'-P-CCNC: 62 U/L (ref 10–44)
ANION GAP SERPL CALC-SCNC: 9 MMOL/L (ref 8–16)
AST SERPL-CCNC: 39 U/L (ref 10–40)
BASOPHILS # BLD AUTO: 0.05 K/UL (ref 0–0.2)
BASOPHILS NFR BLD: 0.7 % (ref 0–1.9)
BILIRUB SERPL-MCNC: 0.4 MG/DL (ref 0.1–1)
BUN SERPL-MCNC: 20 MG/DL (ref 8–23)
CALCIUM SERPL-MCNC: 9.2 MG/DL (ref 8.7–10.5)
CHLORIDE SERPL-SCNC: 98 MMOL/L (ref 95–110)
CO2 SERPL-SCNC: 26 MMOL/L (ref 23–29)
CREAT SERPL-MCNC: 1.1 MG/DL (ref 0.5–1.4)
DIFFERENTIAL METHOD BLD: ABNORMAL
EOSINOPHIL # BLD AUTO: 0 K/UL (ref 0–0.5)
EOSINOPHIL NFR BLD: 0.3 % (ref 0–8)
ERYTHROCYTE [DISTWIDTH] IN BLOOD BY AUTOMATED COUNT: 13.1 % (ref 11.5–14.5)
EST. GFR  (NO RACE VARIABLE): >60 ML/MIN/1.73 M^2
GLUCOSE SERPL-MCNC: 105 MG/DL (ref 70–110)
HCT VFR BLD AUTO: 32.7 % (ref 40–54)
HGB BLD-MCNC: 11 G/DL (ref 14–18)
IMM GRANULOCYTES # BLD AUTO: 0.03 K/UL (ref 0–0.04)
IMM GRANULOCYTES NFR BLD AUTO: 0.4 % (ref 0–0.5)
LYMPHOCYTES # BLD AUTO: 1 K/UL (ref 1–4.8)
LYMPHOCYTES NFR BLD: 14.7 % (ref 18–48)
MCH RBC QN AUTO: 31.3 PG (ref 27–31)
MCHC RBC AUTO-ENTMCNC: 33.6 G/DL (ref 32–36)
MCV RBC AUTO: 93 FL (ref 82–98)
MONOCYTES # BLD AUTO: 0.5 K/UL (ref 0.3–1)
MONOCYTES NFR BLD: 6.7 % (ref 4–15)
NEUTROPHILS # BLD AUTO: 5.3 K/UL (ref 1.8–7.7)
NEUTROPHILS NFR BLD: 77.2 % (ref 38–73)
NRBC BLD-RTO: 0 /100 WBC
PLATELET # BLD AUTO: 492 K/UL (ref 150–450)
PMV BLD AUTO: 9.6 FL (ref 9.2–12.9)
POTASSIUM SERPL-SCNC: 4.7 MMOL/L (ref 3.5–5.1)
PROT SERPL-MCNC: 6.8 G/DL (ref 6–8.4)
RBC # BLD AUTO: 3.51 M/UL (ref 4.6–6.2)
SODIUM SERPL-SCNC: 133 MMOL/L (ref 136–145)
WBC # BLD AUTO: 6.89 K/UL (ref 3.9–12.7)

## 2025-02-28 PROCEDURE — 99999 PR PBB SHADOW E&M-EST. PATIENT-LVL V: CPT | Mod: PBBFAC,HCNC,,

## 2025-02-28 PROCEDURE — 85025 COMPLETE CBC W/AUTO DIFF WBC: CPT | Mod: HCNC

## 2025-02-28 PROCEDURE — 80053 COMPREHEN METABOLIC PANEL: CPT | Mod: HCNC

## 2025-02-28 PROCEDURE — 36415 COLL VENOUS BLD VENIPUNCTURE: CPT | Mod: HCNC

## 2025-02-28 RX ORDER — CETIRIZINE HYDROCHLORIDE 10 MG/1
10 TABLET ORAL DAILY
COMMUNITY

## 2025-02-28 RX ORDER — CEFPODOXIME PROXETIL 200 MG/1
200 TABLET, FILM COATED ORAL 2 TIMES DAILY
Qty: 6 TABLET | Refills: 0 | Status: SHIPPED | OUTPATIENT
Start: 2025-03-01 | End: 2025-03-04

## 2025-02-28 RX ORDER — SULFAMETHOXAZOLE AND TRIMETHOPRIM 800; 160 MG/1; MG/1
1 TABLET ORAL 2 TIMES DAILY
Qty: 10 TABLET | Refills: 0 | Status: SHIPPED | OUTPATIENT
Start: 2025-03-02 | End: 2025-03-07

## 2025-02-28 NOTE — PROGRESS NOTES
[facility]     Chief Complaint:  Chief Complaint   Patient presents with    Blood Infection     Pt is here today for a sepsis inf. F/u   Pt also needs refills for sulfamed 800 mg and cefpodoxime 200 mg        Hugh Bates is a 69 y.o. male with multiple medical diagnoses as listed in the medical history and problem list that presents for   Chief Complaint   Patient presents with    Blood Infection     Pt is here today for a sepsis inf. F/u   Pt also needs refills for sulfamed 800 mg and cefpodoxime 200 mg    .   Patient is not known to me with his last appointment in this department on 2/25/2025.      [facility]    Hospital encounter notes, objective/subjective data, diagnostics, and plan of care from 2/17, 2/19, 2/24 reviewed. Also noted reviewed from clinic visit on 2/19 and 2/25.     Pt mostly recently saw Dr. Montero 2/26. Then no palpable fluctuance noted, US from 2/24 noted no drainable fluid collection.     Admits symtoms greatly improved but only has 2 more days of tabs and worried he needs more coverage.     Today patient presents to clinic with wife.  Admits noted but slow improvement to redness and swelling localized to right axillary chest.  Hard indurated area noted mostly to area of erythema.  No lymph node involvement.  Patient reports pain much improved.  Previously very painful with minimal palpation.  Today able to palpate area without much pain.  Area still remains very indurated.  Not soft to touch.  I do not believe I&D is appropriate today in clinic.  No fever.  Vital signs stable.  Patient admits feeling well otherwise.  Able to go for a walk today.  Patient has upcoming appointment with ID on March 13th.  And has follow-up appointment with primary care clinic on March 11th.  I will go ahead and place referral to general surgery in case I&D is required.  We will extend current antibiotics.     Other concerns below. See chart review for previous notes.     Initial cut to hand, healing wound to right  hand/knuckle noted.  Assessment & Plan       1. Abscess  F/u with gen surgery  Consider CT of chest  Labs today, monitor trend  Will extend Vantin and bactrim for 10 full days  STRICT return precautions    VSS, clinically well appearing in clinic today  Keep f/u appts with ID/PCP  -     Ambulatory referral/consult to General Surgery; Future; Expected date: 03/07/2025  -     cefpodoxime (VANTIN) 200 MG tablet; Take 1 tablet (200 mg total) by mouth 2 (two) times daily. for 3 days  Dispense: 6 tablet; Refill: 0    2. Cellulitis of chest wall          -     Ambulatory referral/consult to General Surgery; Future; Expected date: 03/07/2025  -     cefpodoxime (VANTIN) 200 MG tablet; Take 1 tablet (200 mg total) by mouth 2 (two) times daily. for 3 days  Dispense: 6 tablet; Refill: 0  -     Cancel: CBC Auto Differential; Future; Expected date: 02/28/2025  -     Cancel: Comprehensive Metabolic Panel; Future; Expected date: 02/28/2025  -     CBC Auto Differential; Future; Expected date: 02/28/2025  -     Comprehensive Metabolic Panel; Future; Expected date: 02/28/2025    3. Elevated LFTs  Lab Results   Component Value Date     (H) 02/24/2025    AST 91 (H) 02/24/2025     (H) 12/18/2018    ALKPHOS 357 (H) 02/24/2025    BILITOT 0.4 02/24/2025     Labs today to monitor trend. Consider further work-up as indicated.     -     Cancel: CBC Auto Differential; Future; Expected date: 02/28/2025  -     Cancel: Comprehensive Metabolic Panel; Future; Expected date: 02/28/2025  -     CBC Auto Differential; Future; Expected date: 02/28/2025  -     Comprehensive Metabolic Panel; Future; Expected date: 02/28/2025    Other orders  -     sulfamethoxazole-trimethoprim 800-160mg (BACTRIM DS) 800-160 mg Tab; Take 1 tablet by mouth 2 (two) times daily. for 5 days  Dispense: 10 tablet; Refill: 0          --------------------------------------------      Health Maintenance:  Health Maintenance         Date Due Completion Date    TETANUS  VACCINE 12/14/2025 12/14/2015 (Done)    Override on 12/14/2015: Done    High Dose Statin 02/28/2026 2/28/2025    Colorectal Cancer Screening 09/30/2026 9/30/2016    Override on 5/16/2007: Done    Lipid Panel 11/07/2029 11/7/2024            Health maintenance reviewed    Follow Up:  No follow-ups on file.    Discussed DDx, condition, and treatment.   Education sent to patient portal/included in after visit summary.  ED precautions given.   Notify provider if symptoms do not resolve or increase in severity.   Patient verbalizes understanding and agrees with plan of care.      Exam     Review of Systems:  (as noted above)  Review of Systems    Physical Exam:   Physical Exam  Vitals reviewed.   Constitutional:       General: He is not in acute distress.     Appearance: Normal appearance. He is not toxic-appearing.   HENT:      Head: Normocephalic and atraumatic.   Cardiovascular:      Rate and Rhythm: Regular rhythm. Bradycardia present.      Pulses: Normal pulses.      Heart sounds: Normal heart sounds. No murmur heard.  Pulmonary:      Effort: Pulmonary effort is normal. No respiratory distress.      Breath sounds: Normal breath sounds. No wheezing.   Musculoskeletal:         General: Normal range of motion.      Cervical back: Normal range of motion. No rigidity.   Lymphadenopathy:      Cervical: No cervical adenopathy.   Skin:     General: Skin is warm.      Capillary Refill: Capillary refill takes less than 2 seconds.      Coloration: Skin is not cyanotic or jaundiced.      Findings: Abscess, erythema and rash present. No ecchymosis or petechiae. Rash is not crusting or nodular.             Comments: Red - see picture - indurated area with mild TTP and warm to touch    Blue - 2-3 <1cm scaling papules note   Neurological:      General: No focal deficit present.      Mental Status: He is alert and oriented to person, place, and time.   Psychiatric:         Mood and Affect: Mood normal.       Vitals:    02/28/25 1057  "02/28/25 1125   BP: 90/62 110/62   BP Location:  Right arm   Patient Position: Sitting Sitting   Pulse: (!) 45 (!) 54   SpO2: 96%    Weight: 70.9 kg (156 lb 6.7 oz)    Height: 5' 9" (1.753 m)       Body mass index is 23.1 kg/m².    Lab Results   Component Value Date    WBC 6.80 02/24/2025    HGB 10.9 (L) 02/24/2025    HCT 32.5 (L) 02/24/2025     02/24/2025    CHOL 265 (H) 11/07/2024    TRIG 55 11/07/2024    HDL 96 (H) 11/07/2024     (H) 02/24/2025    AST 91 (H) 02/24/2025     02/24/2025    K 4.1 02/24/2025     02/24/2025    CREATININE 0.8 02/24/2025    BUN 13 02/24/2025    CO2 27 02/24/2025    TSH 1.515 02/19/2025    PSA 0.88 11/07/2024    HGBA1C 5.5 11/07/2024       The 10-year ASCVD risk score (Rakel DK, et al., 2019) is: 10.5%    Values used to calculate the score:      Age: 69 years      Sex: Male      Is Non- : No      Diabetic: No      Tobacco smoker: No      Systolic Blood Pressure: 110 mmHg      Is BP treated: No      HDL Cholesterol: 96 mg/dL      Total Cholesterol: 265 mg/dL    (Imaging have been independently reviewed)    History     Past Medical History:  Past Medical History:   Diagnosis Date    Basal cell carcinoma     Urinary tract infection        Past Surgical History:  Past Surgical History:   Procedure Laterality Date    CATARACT EXTRACTION W/  INTRAOCULAR LENS IMPLANT Left 10/7/2024    Procedure: EXTRACTION, CATARACT, WITH IOL INSERTION;  Surgeon: Sepideh Csineros MD;  [facility]  Service: [facility]mology;  Laterality: Left;  ORA, MC    CATARACT EXTRACTION W/  INTRAOCULAR LENS IMPLANT Right 10/28/2024    Procedure: EXTRACTION, CATARACT, WITH IOL INSERTION;  Surgeon: Sepideh Cisneros MD;  [facility];  Service: Ophthalmology;  Laterality: Right;  ANAIS ROA    COLONOSCOPY N/A 9/30/2016    Procedure: COLONOSCOPY;  Surgeon: George Virgen MD;  Location: [facility] (4TH FLR);  Service: Colon and Rectal;  Laterality: N/A;    COLONOSCOPY      " FRACTURE SURGERY  1974    broken thumb    REPAIR OF RETINAL DETACHMENT WITH VITRECTOMY Left 2024    Procedure: REPAIR, RETINAL DETACHMENT, WITH VITRECTOMY;  Surgeon: JOHN Ortiz MD;  Location: [facility] OR [address] [place] [address];  Service: Ophthalmology;  Laterality: Left;       Social History:  Social History[1]    Family History:  Family History   Problem Relation Name Age of Onset    Colon polyps Mother Jimmy Bates     Squamous cell carcinoma Mother Jimmy Bates     Hearing loss Mother Jimmy Bates         Bad hearing, but will not get hearing aid    Cataracts Mother Jimmy Bates     Cancer Father Cristian Bates Jr.          in     Early death Father Cristian Bates Jr.         was 44 in , [employer]    No Known Problems Sister      No Known Problems Sister      No Known Problems Sister      No Known Problems Brother      No Known Problems Maternal Aunt      No Known Problems Maternal Uncle      No Known Problems Paternal Aunt      No Known Problems Paternal Uncle      Arthritis Maternal Grandmother Michaela Wiseman     No Known Problems Maternal Grandfather      No Known Problems Paternal Grandmother      No Known Problems Paternal Grandfather      No Known Problems Other      Diabetes Neg Hx      Melanoma Neg Hx         Allergies and Medications: (updated and reviewed)  Review of patient's allergies indicates:  No Known Allergies  Current Medications[2]    Patient Care Team:  Joseph Santana MD as PCP - General (Internal Medicine)  Paco, Talya, PharmD as Hyperlipidemia Digital Medicine Clinician (Pharmacist)  Joseph Santana MD as Hyperlipidemia Digital Medicine Responsible Provider (Internal Medicine)  Patti Pedro, RN as Utilization Manager         - The patient is given an After Visit Summary that lists all medications with directions, allergies, education, orders placed during this encounter and follow-up instructions.      - I have reviewed the patient's  medical information including past medical, family, and social history sections including the medications and allergies.      - We discussed the patient's current medications.     This note was created by combination of typed  and MModal dictation.  Transcription errors may be present.  If there are any questions, please contact me.                 [1]   Social History  Socioeconomic History    Marital status:    Occupational History    Occupation: Collections   Tobacco Use    Smoking status: Never    Smokeless tobacco: Never   Substance and Sexual Activity    Alcohol use: Not Currently     Alcohol/week: 7.0 standard drinks of alcohol     Types: 1 Glasses of wine, 6 Cans of beer per week     Comment: occasionally    Drug use: Never    Sexual activity: Yes     Partners: Female     Birth control/protection: None   Social History Narrative    Lives w/wife.  3 adult children.       Social Drivers of Health     Financial Resource Strain: Low Risk  (2/20/2025)    Overall Financial Resource Strain (CARDIA)     Difficulty of Paying Living Expenses: Not hard at all   Food Insecurity: No Food Insecurity (2/20/2025)    Hunger Vital Sign     Worried About Running Out of Food in the Last Year: Never true     Ran Out of Food in the Last Year: Never true   Transportation Needs: No Transportation Needs (2/18/2025)    PRAPARE - Transportation     Lack of Transportation (Medical): No     Lack of Transportation (Non-Medical): No   Physical Activity: Sufficiently Active (2/18/2025)    Exercise Vital Sign     Days of Exercise per Week: 5 days     Minutes of Exercise per Session: 60 min   Stress: No Stress Concern Present (2/20/2025)    [employer] [place] [employer] - Occupational Stress Questionnaire     Feeling of Stress : Not at all   Housing Stability: Low Risk  (2/20/2025)    Housing Stability Vital Sign     Unable to Pay for Housing in the Last Year: No     Homeless in the Last Year: No   [2]   Current  Outpatient Medications   Medication Sig Dispense Refill    acetaminophen (TYLENOL) 500 MG tablet Take 2 tablets (1,000 mg total) by mouth every 8 (eight) hours as needed for Pain.      cefpodoxime (VANTIN) 200 MG tablet Take 2 tablets (400 mg total) by mouth 2 (two) times daily. for 7 days 28 tablet 0    cetirizine (ZYRTEC) 10 MG tablet Take 10 mg by mouth once daily.      collagen, hydrolysate, bovine, (COLLAGEN, HYDR, BOVINE,, BULK,) 100 % Powd       fish,bora,flax oils-om3,6,9no1 400-400-400 mg Cap Take by mouth.      fluconazole (DIFLUCAN) 200 MG Tab Take BIW x 2 weeks 4 tablet 0    glucosamine-chondroitin 500-400 mg tablet Take 2 tablets by mouth once daily.      loratadine (CLARITIN) 10 mg tablet Take 1 tablet (10 mg total) by mouth once daily.      multivitamin (THERAGRAN) per tablet Take 1 tablet by mouth once daily.      rosuvastatin (CRESTOR) 5 MG tablet Take 1 tablet (5 mg total) by mouth once daily. 90 tablet 3    sulfamethoxazole-trimethoprim 800-160mg (BACTRIM DS) 800-160 mg Tab Take 1 tablet by mouth 2 (two) times daily. for 5 days 10 tablet 0    [START ON 3/1/2025] cefpodoxime (VANTIN) 200 MG tablet Take 1 tablet (200 mg total) by mouth 2 (two) times daily. for 3 days 6 tablet 0    [START ON 3/2/2025] sulfamethoxazole-trimethoprim 800-160mg (BACTRIM DS) 800-160 mg Tab Take 1 tablet by mouth 2 (two) times daily. for 5 days 10 tablet 0     No current facility-administered medications for this visit.

## 2025-02-28 NOTE — TELEPHONE ENCOUNTER
----- Message from Med Assistant Cierra sent at 2/28/2025  8:55 AM CST -----  Name of Who is Calling: Hugh  What is the request in detail: Pt states the antibiotics he is taking is almost out and the infection is not gone, pt wants to know if he can get some more called in until he comes back in for a visit. Pt wants it sent to [id number] - [place], [place] - [facility] [address] AT [facility] [address] [facility].  Can the clinic reply by [facility]: No  What Number to Call Back if not in [facility]: [phone]

## 2025-03-03 ENCOUNTER — OFFICE VISIT (OUTPATIENT)
Dept: SURGERY | Facility: CLINIC | Age: 70
End: 2025-03-03
Attending: SPECIALIST
Payer: MEDICARE

## 2025-03-03 VITALS
HEART RATE: 48 BPM | SYSTOLIC BLOOD PRESSURE: 120 MMHG | DIASTOLIC BLOOD PRESSURE: 69 MMHG | HEIGHT: 69 IN | WEIGHT: 153 LBS | OXYGEN SATURATION: 98 % | BODY MASS INDEX: 22.66 KG/M2

## 2025-03-03 DIAGNOSIS — L03.313 CELLULITIS OF CHEST WALL: ICD-10-CM

## 2025-03-03 DIAGNOSIS — L02.91 ABSCESS: ICD-10-CM

## 2025-03-03 PROCEDURE — 3074F SYST BP LT 130 MM HG: CPT | Mod: HCNC,CPTII,S$GLB, | Performed by: SPECIALIST

## 2025-03-03 PROCEDURE — 3078F DIAST BP <80 MM HG: CPT | Mod: HCNC,CPTII,S$GLB, | Performed by: SPECIALIST

## 2025-03-03 PROCEDURE — 99202 OFFICE O/P NEW SF 15 MIN: CPT | Mod: HCNC,S$GLB,, | Performed by: SPECIALIST

## 2025-03-03 PROCEDURE — 1159F MED LIST DOCD IN RCRD: CPT | Mod: HCNC,CPTII,S$GLB, | Performed by: SPECIALIST

## 2025-03-03 PROCEDURE — 1111F DSCHRG MED/CURRENT MED MERGE: CPT | Mod: HCNC,CPTII,S$GLB, | Performed by: SPECIALIST

## 2025-03-03 PROCEDURE — 3008F BODY MASS INDEX DOCD: CPT | Mod: HCNC,CPTII,S$GLB, | Performed by: SPECIALIST

## 2025-03-03 PROCEDURE — 3288F FALL RISK ASSESSMENT DOCD: CPT | Mod: HCNC,CPTII,S$GLB, | Performed by: SPECIALIST

## 2025-03-03 PROCEDURE — 1160F RVW MEDS BY RX/DR IN RCRD: CPT | Mod: HCNC,CPTII,S$GLB, | Performed by: SPECIALIST

## 2025-03-03 PROCEDURE — 99999 PR PBB SHADOW E&M-EST. PATIENT-LVL IV: CPT | Mod: PBBFAC,HCNC,, | Performed by: SPECIALIST

## 2025-03-03 PROCEDURE — 1101F PT FALLS ASSESS-DOCD LE1/YR: CPT | Mod: HCNC,CPTII,S$GLB, | Performed by: SPECIALIST

## 2025-03-03 PROCEDURE — 1125F AMNT PAIN NOTED PAIN PRSNT: CPT | Mod: HCNC,CPTII,S$GLB, | Performed by: SPECIALIST

## 2025-03-03 RX ORDER — CEFPODOXIME PROXETIL 200 MG/1
200 TABLET, FILM COATED ORAL 2 TIMES DAILY
Qty: 12 TABLET | Refills: 0 | Status: SHIPPED | OUTPATIENT
Start: 2025-03-03 | End: 2025-03-09

## 2025-03-03 RX ORDER — SULFAMETHOXAZOLE AND TRIMETHOPRIM 800; 160 MG/1; MG/1
1 TABLET ORAL 2 TIMES DAILY
Qty: 20 TABLET | Refills: 0 | Status: SHIPPED | OUTPATIENT
Start: 2025-03-03 | End: 2025-03-13

## 2025-03-03 NOTE — PROGRESS NOTES
70-year-old male admitted to [facility] on 02/19/2025 with history of abrasion left hand with subsequent right axillary adenopathy and axillary/chest wall cellulitis.  Blood cultures from admission were negative.  Patient has spent 4 days in the hospital on IV antibiotics.  Patient now on Bactrim and Vantin.  Diflucan 2 times per week.    Subjective   Feeling better  Minimal pain  No fever, chills, diaphoresis    PE  Right axilla with 5 cm nontender lymph node.  No fluctuance, erythema axilla anterior chest wall    Impression/plan   History of cellulitis, lymphadenitis right upper extremity/chest wall given degree of residual cellulitis and adenopathy I believe long course of antibiotics is appropriate.  Continue present antibiotics .  Prescriptions extended for 2 weeks.  RTC 2 weeks

## 2025-03-04 NOTE — PHYSICIAN QUERY
Provider, due to the conflicting clinical picture, please clinically validate the diagnosis of sepsis. If validated, please provide additional clinical support for the diagnosis.     Patient presented with sepsis secondary to extensive cellulitis of right chest wall manifested by fever, left-shift, hypotension, thrombocytopenia.  Resolved with treatment.

## 2025-03-07 ENCOUNTER — PATIENT MESSAGE (OUTPATIENT)
Dept: INTERNAL MEDICINE | Facility: CLINIC | Age: 70
End: 2025-03-07
Payer: MEDICARE

## 2025-03-10 NOTE — TELEPHONE ENCOUNTER
"Pt msg,    "Dr. Guevara,  In my appointment Monday 3/3 with the surgeon Titi Novak Jr., he said no surgery at this time and to stay on the antibiotics. He gave me prescriptions for 6 days (12) of CEFPODOXIME PRO and 10 days (20) of SULFAMETH /TRIMETHOPRIM. The swelling declines a little each day & I can move my arm better each day, but its still the size of a Pecan. Should I have been prescribed 10 days of the CEFPODOXIME also or just complete what I have?  I have 4 pills of CEF. left after taking 1 today.  Thank you"  "

## 2025-03-11 ENCOUNTER — OFFICE VISIT (OUTPATIENT)
Dept: INTERNAL MEDICINE | Facility: CLINIC | Age: 70
End: 2025-03-11
Attending: INTERNAL MEDICINE
Payer: MEDICARE

## 2025-03-11 ENCOUNTER — PATIENT OUTREACH (OUTPATIENT)
Facility: OTHER | Age: 70
End: 2025-03-11
Payer: MEDICARE

## 2025-03-11 VITALS
WEIGHT: 152.56 LBS | HEART RATE: 50 BPM | SYSTOLIC BLOOD PRESSURE: 130 MMHG | OXYGEN SATURATION: 97 % | DIASTOLIC BLOOD PRESSURE: 70 MMHG | BODY MASS INDEX: 22.6 KG/M2 | HEIGHT: 69 IN

## 2025-03-11 DIAGNOSIS — E78.5 DYSLIPIDEMIA: ICD-10-CM

## 2025-03-11 DIAGNOSIS — L02.91 ABSCESS: ICD-10-CM

## 2025-03-11 DIAGNOSIS — L03.90 CELLULITIS, UNSPECIFIED CELLULITIS SITE: ICD-10-CM

## 2025-03-11 DIAGNOSIS — L03.313 CELLULITIS OF CHEST WALL: ICD-10-CM

## 2025-03-11 DIAGNOSIS — I10 PRIMARY HYPERTENSION: ICD-10-CM

## 2025-03-11 DIAGNOSIS — R79.89 ELEVATED LFTS: ICD-10-CM

## 2025-03-11 DIAGNOSIS — R74.8 ELEVATED ALKALINE PHOSPHATASE LEVEL: Primary | ICD-10-CM

## 2025-03-11 PROCEDURE — 1101F PT FALLS ASSESS-DOCD LE1/YR: CPT | Mod: CPTII,HCNC,S$GLB, | Performed by: INTERNAL MEDICINE

## 2025-03-11 PROCEDURE — 3075F SYST BP GE 130 - 139MM HG: CPT | Mod: CPTII,HCNC,S$GLB, | Performed by: INTERNAL MEDICINE

## 2025-03-11 PROCEDURE — 99999 PR PBB SHADOW E&M-EST. PATIENT-LVL III: CPT | Mod: PBBFAC,HCNC,, | Performed by: INTERNAL MEDICINE

## 2025-03-11 PROCEDURE — G2211 COMPLEX E/M VISIT ADD ON: HCPCS | Mod: HCNC,S$GLB,, | Performed by: INTERNAL MEDICINE

## 2025-03-11 PROCEDURE — 1125F AMNT PAIN NOTED PAIN PRSNT: CPT | Mod: CPTII,HCNC,S$GLB, | Performed by: INTERNAL MEDICINE

## 2025-03-11 PROCEDURE — 1111F DSCHRG MED/CURRENT MED MERGE: CPT | Mod: CPTII,HCNC,S$GLB, | Performed by: INTERNAL MEDICINE

## 2025-03-11 PROCEDURE — 3078F DIAST BP <80 MM HG: CPT | Mod: CPTII,HCNC,S$GLB, | Performed by: INTERNAL MEDICINE

## 2025-03-11 PROCEDURE — 3288F FALL RISK ASSESSMENT DOCD: CPT | Mod: CPTII,HCNC,S$GLB, | Performed by: INTERNAL MEDICINE

## 2025-03-11 PROCEDURE — 99214 OFFICE O/P EST MOD 30 MIN: CPT | Mod: HCNC,S$GLB,, | Performed by: INTERNAL MEDICINE

## 2025-03-11 PROCEDURE — 3008F BODY MASS INDEX DOCD: CPT | Mod: CPTII,HCNC,S$GLB, | Performed by: INTERNAL MEDICINE

## 2025-03-11 RX ORDER — SULFAMETHOXAZOLE AND TRIMETHOPRIM 800; 160 MG/1; MG/1
1 TABLET ORAL 2 TIMES DAILY
Qty: 14 TABLET | Refills: 0 | Status: SHIPPED | OUTPATIENT
Start: 2025-03-11 | End: 2025-03-18

## 2025-03-11 NOTE — PROGRESS NOTES
Successfully contacted patient to confirm appointment for 3/11/2025 at 10:00 AM with Joseph Santana MD at [address] [place] [address] and 3/13/2025 at 11:00 AM wit Shari Jordan MD at [address] [place] [address]. Patient does hope his PCP can handle his hospital follow up so he doesn't have to see infectious disease also but will go to the appointment based on what his primary says today.

## 2025-03-11 NOTE — PROGRESS NOTES
"Subjective:       Patient ID: Hugh Bates is a 70 y.o. male.    Chief Complaint: Hospital Follow Up (F/u on right arm, "swollen lympnode") and Antibiotic medication follow up    Here for f/u         saw Dr. Montero 2/26. Then no palpable fluctuance noted, US from 2/24 noted no drainable fluid collection.      Titi Novak Jr., MD at 3/3/2025  writes "History of cellulitis, lymphadenitis right upper extremity/chest wall given degree of residual cellulitis and adenopathy I believe long course of antibiotics is appropriate.  Continue present antibiotics .  Prescriptions extended for 2 weeks. RTC 2 weeks"    Right axilla with 5 cm nontender lymph node.  No fluctuance, erythema axilla anterior chest wal        History of Present Illness    CHIEF COMPLAINT:  Hugh presents today for follow up of an abrasion on knuckle with subsequent infection.    HISTORY OF PRESENT ILLNESS:  He sustained a knuckle abrasion on February 14th while cleaning at his mother's house, initially treating it with hand  and a band-aid. On February 17th, he developed systemic symptoms including chills, fever, diarrhea, headache, nausea, and vomiting. The following day, he noticed redness in the affected area.    MEDICATIONS:  He is currently taking Bactrim (sulfamethoxazole/trimethoprim). He was previously prescribed oral Cefalex.    ALLERGIES:  He has a history of rash with doxycycline.      ROS:  General: +fever, +chills, -fatigue, -weight gain, -weight loss  Eyes: -vision changes, -redness, -discharge  ENT: -ear pain, -nasal congestion, -sore throat  Cardiovascular: -chest pain, -palpitations, -lower extremity edema  Respiratory: -cough, -shortness of breath  Gastrointestinal: -abdominal pain, +nausea, +vomiting, +diarrhea, -constipation, -blood in stool  Genitourinary: -dysuria, -hematuria, -frequency  Musculoskeletal: -joint pain, -muscle pain  Skin: -rash, -lesion  Neurological: +headache, -dizziness, -numbness, " "-tingling  Psychiatric: -anxiety, -depression, -sleep difficulty         Review of Systems   Constitutional:  Negative for appetite change, chills, fever and unexpected weight change.   HENT:  Negative for hearing loss, sore throat and trouble swallowing.    Eyes:  Negative for visual disturbance.   Respiratory:  Negative for cough, chest tightness and shortness of breath.    Cardiovascular:  Negative for chest pain and leg swelling.   Gastrointestinal:  Negative for abdominal pain, blood in stool, constipation, diarrhea, nausea and vomiting.   Endocrine: Negative for polydipsia and polyuria.   Genitourinary:  Negative for decreased urine volume, difficulty urinating, dysuria, frequency and urgency.   Musculoskeletal:  Negative for gait problem.   Skin:  Negative for rash.   Neurological:  Negative for dizziness and numbness.   Psychiatric/Behavioral:  The patient is not nervous/anxious.        Objective:      Vitals:    03/11/25 1000   BP: 130/70   BP Location: Left arm   Patient Position: Sitting   Pulse: (!) 50   SpO2: 97%   Weight: 69.2 kg (152 lb 8.9 oz)   Height: 5' 9" (1.753 m)      Physical Exam  Constitutional:       General: He is not in acute distress.     Appearance: Normal appearance. He is well-developed. He is not diaphoretic.   HENT:      Head: Normocephalic and atraumatic.   Eyes:      General: No scleral icterus.        Right eye: No discharge.         Left eye: No discharge.      Conjunctiva/sclera: Conjunctivae normal.   Pulmonary:      Effort: Pulmonary effort is normal. No respiratory distress.   Abdominal:      General: There is no distension.   Skin:     General: Skin is warm and dry.   Neurological:      Mental Status: He is alert and oriented to person, place, and time.   Psychiatric:         Speech: Speech normal.       Assessment:       1. Elevated alkaline phosphatase level    2. Abscess    3. Cellulitis of chest wall    4. Elevated LFTs    5. Cellulitis, unspecified cellulitis site  "   6. Primary hypertension    7. Dyslipidemia        Plan:       Hugh was seen today for hospital follow up and antibiotic medication follow up.    Diagnoses and all orders for this visit:    Elevated alkaline phosphatase level   Asymptotic. Eval liver. Office and Emergency Department prompts discussed.  -     US Abdomen Limited; Future  -     GAMMA GT; Future  -     Comprehensive Metabolic Panel; Future    Abscess  -     sulfamethoxazole-trimethoprim 800-160mg (BACTRIM DS) 800-160 mg Tab; Take 1 tablet by mouth 2 (two) times daily. for 7 days    Cellulitis of chest wall  -     sulfamethoxazole-trimethoprim 800-160mg (BACTRIM DS) 800-160 mg Tab; Take 1 tablet by mouth 2 (two) times daily. for 7 days    Elevated LFTs    Cellulitis, unspecified cellulitis site   Overall much improved but unclear if completely resolved so extend Abx  Primary hypertension   Controlled and asymptomatic.  Continue current Rx regimen.    Dyslipidemia  -     Lipid Panel; Future       Visit today is associated with current or anticipated ongoing medical care related to this patient's single serious condition/complex condition of HTN. The patient will return to see me as these issues will be followed longitudinally.      Assessment & Plan        CELLULITIS:  - Extended antibiotic treatment for 1 more week, continuing sulfamethoxazole/trimethoprim (Bactrim) for 4-5 more days to complete the course.  - Hugh initially observed an abrasion on the right hand knuckle on February 14th, which developed into cellulitis, extending from the finger to the arm and back, taking several weeks to resolve.  - Initial treatment included oral antibiotics (Cefalex), followed by IV antibiotics in the hospital.  - Currently, there is persistent swelling/inflammation potentially related to cellulitis, with residual redness.  - Considering further evaluation, including ultrasound and lab work, due to persistent inflammation.    DIABETES:  - Assessed diabetes as  well-controlled based on patient's reported glucose levels: morning levels between 80 to 92 mg/dL, with post-prandial levels not exceeding 120 mg/dL.  - Current treatment includes metformin (2 tablets twice daily).  - Considering reducing metformin dosage due to tight glucose control, decreased from twice daily to daily in the morning, pending A1C results.  - Ordered A1C test.  - Considering podiatry referral for chronic toe issue, potentially related to diabetes.    HYPERTENSION:  - Initiated Valsartan 80 mg daily for hypertension treatment.  - Discussed potential benefits of angiotensin receptor blockers (ARBs) in protecting heart and kidneys.    SLEEP APNEA:  - Reviewed sleep apnea treatment compliance.  - Noted treatment is considered essential for overall health management.    ELEVATED LIVER ENZYMES:  - Expressed concern about elevated liver enzymes (alkaline phosphatase, LFTs).  - Ordered right upper quadrant ultrasound, liver function tests (LFTs), and gamma-glutamyl transferase (GGT) test for further investigation.  - Planned to follow up with ultrasound and lab work in 2 weeks to reassess liver enzyme levels.  - Considered elevated liver enzymes might be related to recent infection and antibiotic use.    MEDICATIONS/SUPPLEMENTS:  - Noted patient developed a rash from doxycycline.  - Explained rationale for continuing red yeast rice and turmeric supplements.  - Peter to discontinue caffeine and stimulant intake.    FOLLOW UP:  - Scheduled follow-up visit in 2 weeks for labs and ultrasound results.  - Peter to contact office if any concerns arise before next appointment.         This note was generated with the assistance of ambient listening technology. Verbal consent was obtained by the patient and accompanying visitor(s) for the recording of patient appointment to facilitate this note. I attest to having reviewed and edited the generated note for accuracy, though some syntax or spelling errors may persist.  Please contact the author of this note for any clarification.      Joseph Strong MD  Internal Medicine-[facility]        Side effects of medication(s) were discussed in detail and patient voiced understanding.  Patient will call back for any issues or complications.

## 2025-03-13 ENCOUNTER — LAB VISIT (OUTPATIENT)
Dept: LAB | Facility: HOSPITAL | Age: 70
End: 2025-03-13
Attending: INTERNAL MEDICINE
Payer: MEDICARE

## 2025-03-13 ENCOUNTER — OFFICE VISIT (OUTPATIENT)
Dept: INFECTIOUS DISEASES | Facility: CLINIC | Age: 70
End: 2025-03-13
Payer: MEDICARE

## 2025-03-13 ENCOUNTER — RESULTS FOLLOW-UP (OUTPATIENT)
Dept: INFECTIOUS DISEASES | Facility: CLINIC | Age: 70
End: 2025-03-13

## 2025-03-13 VITALS
SYSTOLIC BLOOD PRESSURE: 128 MMHG | DIASTOLIC BLOOD PRESSURE: 77 MMHG | HEIGHT: 69 IN | BODY MASS INDEX: 22.08 KG/M2 | TEMPERATURE: 98 F | WEIGHT: 149.06 LBS | HEART RATE: 50 BPM

## 2025-03-13 DIAGNOSIS — L03.313 CELLULITIS OF CHEST WALL: Primary | ICD-10-CM

## 2025-03-13 DIAGNOSIS — L03.313 CELLULITIS OF CHEST WALL: ICD-10-CM

## 2025-03-13 LAB
ALBUMIN SERPL BCP-MCNC: 3.4 G/DL (ref 3.5–5.2)
ALP SERPL-CCNC: 166 U/L (ref 40–150)
ALT SERPL W/O P-5'-P-CCNC: 26 U/L (ref 10–44)
ANION GAP SERPL CALC-SCNC: 7 MMOL/L (ref 8–16)
AST SERPL-CCNC: 33 U/L (ref 10–40)
BILIRUB SERPL-MCNC: 0.3 MG/DL (ref 0.1–1)
BUN SERPL-MCNC: 28 MG/DL (ref 8–23)
CALCIUM SERPL-MCNC: 9 MG/DL (ref 8.7–10.5)
CHLORIDE SERPL-SCNC: 98 MMOL/L (ref 95–110)
CO2 SERPL-SCNC: 25 MMOL/L (ref 23–29)
CREAT SERPL-MCNC: 1.1 MG/DL (ref 0.5–1.4)
EST. GFR  (NO RACE VARIABLE): >60 ML/MIN/1.73 M^2
GLUCOSE SERPL-MCNC: 88 MG/DL (ref 70–110)
POTASSIUM SERPL-SCNC: 4.9 MMOL/L (ref 3.5–5.1)
PROT SERPL-MCNC: 7.1 G/DL (ref 6–8.4)
SODIUM SERPL-SCNC: 130 MMOL/L (ref 136–145)

## 2025-03-13 PROCEDURE — 36415 COLL VENOUS BLD VENIPUNCTURE: CPT | Mod: HCNC | Performed by: INTERNAL MEDICINE

## 2025-03-13 PROCEDURE — 80053 COMPREHEN METABOLIC PANEL: CPT | Mod: HCNC | Performed by: INTERNAL MEDICINE

## 2025-03-13 PROCEDURE — 99999 PR PBB SHADOW E&M-EST. PATIENT-LVL III: CPT | Mod: PBBFAC,HCNC,, | Performed by: INTERNAL MEDICINE

## 2025-03-13 RX ORDER — OMEGA-3/DHA/EPA/FISH OIL 300-1000MG
CAPSULE,DELAYED RELEASE (ENTERIC COATED) ORAL
Status: CANCELLED | OUTPATIENT
Start: 2025-03-13

## 2025-03-13 RX ORDER — CETIRIZINE HYDROCHLORIDE 10 MG/1
10 TABLET ORAL DAILY
Status: CANCELLED | OUTPATIENT
Start: 2025-03-13

## 2025-03-13 NOTE — PROGRESS NOTES
Infectious Disease Clinic Note  2025       Subjective:       Patient ID: Hugh Bates is a 70 y.o. male being seen for an new visit.    Chief Complaint: Hospital Follow Up    HPI  70y/o M pt with hx of HLD who was admitted to [facility] -  for worsening R chest wall cellulitis presents for follow-up.     Of note, he suffered a small laceration to R 2 finger knuckle on  while cleaning out his  mom's house. Proceeded to have R axilla pain 3 days later as well as fever and chills. Symptoms did not improve on keflex. Then presented with R chest erythema. Started on empiric vanc and zosyn in the hospital with improvement.       Suspected  R hand wound may have been the entry point for infection. [place]posed [place], but low suspicion for atypical infection as was improving on vanc/ zosyn. Had new area of induration near R axilla concerning for LN vs abscess. US  without focal fluid collection. Did show reactive lymph nodes.   No animal exposures.    Discharged on doxycycline and cefpodoxime x 7 days, but developed a new skin rash. ED doctor switched him to bactrim and cefpodoxime the next day. Stopped doxy. Has continued to have very slow improvement.       Saw surgery 3/3- recommended abx be extended. No plan for surgical intervention.   Currently on bactrim monotherapy. Last took cefpodoxime on . Notes redness and swelling has continued to improve. Denies systemic signs or symptoms of infection.       Family History   Problem Relation Name Age of Onset    Colon polyps Mother Jimmy Bates     Squamous cell carcinoma Mother Jimmy Bates     Hearing loss Mother Jimmy Bates         Bad hearing, but will not get hearing aid    Cataracts Mother Jimmy Bates     Cancer Father Cristian Bates [place]          in     Early death Father Cristian Bates .         was 44 in , [employer]    No Known Problems Sister      No Known  Problems Sister      No Known Problems Sister      No Known Problems Brother      No Known Problems Maternal Aunt      No Known Problems Maternal Uncle      No Known Problems Paternal Aunt      No Known Problems Paternal Uncle      Arthritis Maternal Grandmother Michaela Wiseman     No Known Problems Maternal Grandfather      No Known Problems Paternal Grandmother      No Known Problems Paternal Grandfather      No Known Problems Other      Diabetes Neg Hx      Melanoma Neg Hx       Social History[1]  Past Surgical History:   Procedure Laterality Date    CATARACT EXTRACTION W/  INTRAOCULAR LENS IMPLANT Left 10/7/2024    Procedure: EXTRACTION, CATARACT, WITH IOL INSERTION;  Surgeon: Sepideh Cisneros MD;  Location: [facility];  Service: Ophthalmology;  Laterality: Left;  [facility]    CATARACT EXTRACTION W/  INTRAOCULAR LENS IMPLANT Right 10/28/2024    Procedure: EXTRACTION, CATARACT, WITH IOL INSERTION;  Surgeon: Sepideh Cisneros MD;  Location: [facility] OR;  Service: Ophthalmology;  Laterality: Right;  [facility]    COLONOSCOPY N/A 9/30/2016    Procedure: COLONOSCOPY;  Surgeon: George Virgen MD;  Location: [facility] (4TH FLR);  Service: Colon and Rectal;  Laterality: N/A;    COLONOSCOPY      FRACTURE SURGERY  1974    broken thumb    REPAIR OF RETINAL DETACHMENT WITH VITRECTOMY Left 2/7/2024    Procedure: REPAIR, RETINAL DETACHMENT, WITH VITRECTOMY;  Surgeon: JOHN Ortiz MD;  Location: [facility] OR 1ST FLR;  Service: Ophthalmology;  Laterality: Left;       Patient's Medications   New Prescriptions    No medications on file   Previous Medications    ACETAMINOPHEN (TYLENOL) 500 MG TABLET    Take 2 tablets (1,000 mg total) by mouth every 8 (eight) hours as needed for Pain.    CETIRIZINE (ZYRTEC) 10 MG TABLET    Take 10 mg by mouth once daily.    COLLAGEN, HYDROLYSATE, BOVINE, (COLLAGEN, HYDR, BOVINE,, BULK,) 100 % POWD        FISH,BORA,FLAX OILS-OM3,6,9NO1 400-400-400 MG CAP    Take by mouth.    FLUCONAZOLE (DIFLUCAN) 200 MG TAB    " Take BIW x 2 weeks    GLUCOSAMINE-CHONDROITIN 500-400 MG TABLET    Take 2 tablets by mouth once daily.    LORATADINE (CLARITIN) 10 MG TABLET    Take 1 tablet (10 mg total) by mouth once daily.    MULTIVITAMIN (THERAGRAN) PER TABLET    Take 1 tablet by mouth once daily.    ROSUVASTATIN (CRESTOR) 5 MG TABLET    Take 1 tablet (5 mg total) by mouth once daily.    SULFAMETHOXAZOLE-TRIMETHOPRIM 800-160MG (BACTRIM DS) 800-160 MG TAB    Take 1 tablet by mouth 2 (two) times daily. 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 Negative for chills, diaphoresis, fever and malaise/fatigue.   Respiratory:  Negative for sputum production and shortness of breath.    Musculoskeletal:  Negative for myalgias.   Skin:         Skin erythema adjacent to R axilla           Objective:      /77 (BP Location: Left arm, Patient Position: Sitting)   Pulse (!) 50   Temp 98.2 °F (36.8 °C) (Oral)   Ht 5' 9" (1.753 m)   Wt 67.6 kg (149 lb 0.5 oz)   BMI 22.01 kg/m²   Estimated body mass index is 22.01 kg/m² as calculated from the following:    Height as of this encounter: 5' 9" (1.753 m).    " Weight as of this encounter: 67.6 kg (149 lb 0.5 oz).    Physical Exam  Vitals and nursing note reviewed.   Constitutional:       Appearance: Normal appearance. He is not ill-appearing.   HENT:      Head: Normocephalic and atraumatic.      Nose: Nose normal. No congestion.      Mouth/Throat:      Mouth: Mucous membranes are moist.      Pharynx: Oropharynx is clear.   Eyes:      Conjunctiva/sclera: Conjunctivae normal.      Pupils: Pupils are equal, round, and reactive to light.   Cardiovascular:      Rate and Rhythm: Normal rate and regular rhythm.   Pulmonary:      Effort: Pulmonary effort is normal. No respiratory distress.      Breath sounds: Normal breath sounds.   Abdominal:      General: Abdomen is flat. There is no distension.      Palpations: Abdomen is soft.   Musculoskeletal:         General: No swelling. Normal range of motion.      Cervical back: Normal range of motion and neck supple.   Skin:     General: Skin is warm and dry.      Comments: Small area of skin erythema adjacent to R axilla with indurated area. No tenderness or drainage.   Neurological:      General: No focal deficit present.      Mental Status: He is alert and oriented to person, place, and time.   Psychiatric:         Mood and Affect: Mood normal.         Behavior: Behavior normal.     Today 3/13/25        2/19/25    Assessment:         1. Cellulitis of chest wall  Comprehensive Metabolic Panel            Plan:       Hugh was seen today for hospital follow up.    Diagnoses and all orders for this visit:    Cellulitis of chest wall  71y/o M pt with recent hospital admission for R chest wall cellulitis that has been improving on cefpodoxime and bactrim presents for follow-up. Last took cefpodoxime Sunday, but has continued to improve on bactrim monotherapy. There is still a small area of erythematous skin near R axilla and induration likely from a reactive lymph node (no fluid collection on US).   --continue bactrim x 5 more days then  monitor off antibiotics  --cmp to monitor for drug toxicity  --counseled on signs and symptoms of recurrence of infection and advised to notify me if these were to arise.  --if infection worsens will then consider possible biopsy      Shari Jordan MD  Infectious Disease   Total professional time spent for the encounter: 30 minutes  Time was spent preparing to see the patient, reviewing results of prior testing, obtaining and/or reviewing separately obtained history, performing a medically appropriate examination and interview, counseling and educating the patient/family, ordering medications/tests/procedures, referring and communicating with other health care professionals, documenting clinical information in the electronic health record, and independently interpreting results.          [1]   Social History  Socioeconomic History    Marital status:    Occupational History    Occupation: Collections   Tobacco Use    Smoking status: Never    Smokeless tobacco: Never   Substance and Sexual Activity    Alcohol use: Not Currently     Alcohol/week: 7.0 standard drinks of alcohol     Types: 1 Glasses of wine, 6 Cans of beer per week     Comment: occasionally    Drug use: Never    Sexual activity: Yes     Partners: Female     Birth control/protection: None   Social History Narrative    Lives w/wife.  3 adult children.       Social Drivers of Health     Financial Resource Strain: Low Risk  (2/20/2025)    Overall Financial Resource Strain (CARDIA)     Difficulty of Paying Living Expenses: Not hard at all   Food Insecurity: No Food Insecurity (2/20/2025)    Hunger Vital Sign     Worried About Running Out of Food in the Last Year: Never true     Ran Out of Food in the Last Year: Never true   Transportation Needs: No Transportation Needs (2/18/2025)    PRAPARE - Transportation     Lack of Transportation (Medical): No     Lack of Transportation (Non-Medical): No   Physical Activity: Sufficiently Active  (2/18/2025)    Exercise Vital Sign     Days of Exercise per Week: 5 days     Minutes of Exercise per Session: 60 min   Stress: No Stress Concern Present (2/20/2025)    [employer] [place] [employer] - Occupational Stress Questionnaire     Feeling of Stress : Not at all   Housing Stability: Low Risk  (2/20/2025)    Housing Stability Vital Sign     Unable to Pay for Housing in the Last Year: No     Homeless in the Last Year: No

## 2025-03-14 ENCOUNTER — PATIENT OUTREACH (OUTPATIENT)
Facility: OTHER | Age: 70
End: 2025-03-14
Payer: MEDICARE

## 2025-03-14 NOTE — PROGRESS NOTES
Per chart review, patient attended primary care appointment on 3/11/25 and infectious disease appointment on 3/13/25. Called patient to follow up and assess additional needs. Spoke to patient's wife, states that she was present with patient at both appointments and all needs were addressed at both. No additional concerns at this time.

## 2025-03-24 ENCOUNTER — PATIENT MESSAGE (OUTPATIENT)
Dept: INTERNAL MEDICINE | Facility: CLINIC | Age: 70
End: 2025-03-24
Payer: MEDICARE

## 2025-03-24 ENCOUNTER — OFFICE VISIT (OUTPATIENT)
Dept: SURGERY | Facility: CLINIC | Age: 70
End: 2025-03-24
Attending: SPECIALIST
Payer: MEDICARE

## 2025-03-24 ENCOUNTER — LAB VISIT (OUTPATIENT)
Dept: LAB | Facility: HOSPITAL | Age: 70
End: 2025-03-24
Attending: INTERNAL MEDICINE
Payer: MEDICARE

## 2025-03-24 VITALS
SYSTOLIC BLOOD PRESSURE: 131 MMHG | OXYGEN SATURATION: 98 % | BODY MASS INDEX: 22.07 KG/M2 | HEART RATE: 52 BPM | WEIGHT: 149 LBS | DIASTOLIC BLOOD PRESSURE: 75 MMHG | HEIGHT: 69 IN

## 2025-03-24 DIAGNOSIS — R74.8 ELEVATED ALKALINE PHOSPHATASE LEVEL: ICD-10-CM

## 2025-03-24 DIAGNOSIS — E78.5 DYSLIPIDEMIA: ICD-10-CM

## 2025-03-24 DIAGNOSIS — L03.313 CELLULITIS OF CHEST WALL: Primary | ICD-10-CM

## 2025-03-24 LAB
ALBUMIN SERPL BCP-MCNC: 3.7 G/DL (ref 3.5–5.2)
ALP SERPL-CCNC: 102 UNIT/L (ref 40–150)
ALT SERPL W/O P-5'-P-CCNC: 27 UNIT/L (ref 10–44)
ANION GAP (OHS): 9 MMOL/L (ref 8–16)
AST SERPL-CCNC: 36 UNIT/L (ref 11–45)
BILIRUB SERPL-MCNC: 0.4 MG/DL (ref 0.1–1)
BUN SERPL-MCNC: 20 MG/DL (ref 8–23)
CALCIUM SERPL-MCNC: 9.2 MG/DL (ref 8.7–10.5)
CHLORIDE SERPL-SCNC: 98 MMOL/L (ref 95–110)
CHOLEST SERPL-MCNC: 199 MG/DL (ref 120–199)
CHOLEST/HDLC SERPL: 2.3 {RATIO} (ref 2–5)
CO2 SERPL-SCNC: 27 MMOL/L (ref 23–29)
CREAT SERPL-MCNC: 0.8 MG/DL (ref 0.5–1.4)
GFR SERPLBLD CREATININE-BSD FMLA CKD-EPI: >60 ML/MIN/1.73/M2
GGT SERPL-CCNC: 142 U/L (ref 8–55)
GLUCOSE SERPL-MCNC: 89 MG/DL (ref 70–110)
HDLC SERPL-MCNC: 87 MG/DL (ref 40–75)
HDLC SERPL: 43.7 % (ref 20–50)
LDLC SERPL CALC-MCNC: 97.8 MG/DL (ref 63–159)
NONHDLC SERPL-MCNC: 112 MG/DL
POTASSIUM SERPL-SCNC: 4.6 MMOL/L (ref 3.5–5.1)
PROT SERPL-MCNC: 7.2 GM/DL (ref 6–8.4)
SODIUM SERPL-SCNC: 134 MMOL/L (ref 136–145)
TRIGL SERPL-MCNC: 71 MG/DL (ref 30–150)

## 2025-03-24 PROCEDURE — 3075F SYST BP GE 130 - 139MM HG: CPT | Mod: HCNC,CPTII,S$GLB, | Performed by: SPECIALIST

## 2025-03-24 PROCEDURE — 1101F PT FALLS ASSESS-DOCD LE1/YR: CPT | Mod: HCNC,CPTII,S$GLB, | Performed by: SPECIALIST

## 2025-03-24 PROCEDURE — 82977 ASSAY OF GGT: CPT | Mod: HCNC

## 2025-03-24 PROCEDURE — 36415 COLL VENOUS BLD VENIPUNCTURE: CPT | Mod: HCNC,PN

## 2025-03-24 PROCEDURE — 1160F RVW MEDS BY RX/DR IN RCRD: CPT | Mod: HCNC,CPTII,S$GLB, | Performed by: SPECIALIST

## 2025-03-24 PROCEDURE — 80053 COMPREHEN METABOLIC PANEL: CPT | Mod: HCNC

## 2025-03-24 PROCEDURE — 3288F FALL RISK ASSESSMENT DOCD: CPT | Mod: HCNC,CPTII,S$GLB, | Performed by: SPECIALIST

## 2025-03-24 PROCEDURE — 3008F BODY MASS INDEX DOCD: CPT | Mod: HCNC,CPTII,S$GLB, | Performed by: SPECIALIST

## 2025-03-24 PROCEDURE — 80061 LIPID PANEL: CPT | Mod: HCNC

## 2025-03-24 PROCEDURE — 1111F DSCHRG MED/CURRENT MED MERGE: CPT | Mod: HCNC,CPTII,S$GLB, | Performed by: SPECIALIST

## 2025-03-24 PROCEDURE — 99999 PR PBB SHADOW E&M-EST. PATIENT-LVL III: CPT | Mod: PBBFAC,HCNC,, | Performed by: SPECIALIST

## 2025-03-24 PROCEDURE — 1159F MED LIST DOCD IN RCRD: CPT | Mod: HCNC,CPTII,S$GLB, | Performed by: SPECIALIST

## 2025-03-24 PROCEDURE — 3078F DIAST BP <80 MM HG: CPT | Mod: HCNC,CPTII,S$GLB, | Performed by: SPECIALIST

## 2025-03-24 PROCEDURE — 99212 OFFICE O/P EST SF 10 MIN: CPT | Mod: HCNC,S$GLB,, | Performed by: SPECIALIST

## 2025-03-24 PROCEDURE — 1126F AMNT PAIN NOTED NONE PRSNT: CPT | Mod: HCNC,CPTII,S$GLB, | Performed by: SPECIALIST

## 2025-03-24 NOTE — PROGRESS NOTES
70-year-old male admitted to [facility] on 02/19/2025 with history of abrasion left hand with subsequent right axillary adenopathy and axillary/chest wall cellulitis.  Blood cultures from admission were negative.  Patient has spent 4 days in the hospital on IV antibiotics.  Patient now on Bactrim and Vantin.  Diflucan 2 times per week.  Seen in clinic on 03/03/2025  Infectious disease discontinued antibiotics last Monday  Patient scheduled later this week for follow-up ultrasound infectious disease      Subjective  No fever, chills, diaphoresis  No pain    PE  3 cm, mobile, round, firm mass right axilla    Impression/plan   Clinically improved with decrease in size of mass, no symptoms sepsis  Follow-up with ultrasound  RTC 6 weeks

## 2025-03-24 NOTE — TELEPHONE ENCOUNTER
"This th ept from earlier. Please advise. They wanted to know, after you look at the lab work, if the Abdomen US is needed. Please advise.     Pt msg,    "I got my blood work done today, but my ultrasound is not until Thursday 3/27 since I did know that it was a abdominal ultrasound that I needed to fast for. I thought that the ultrasound was for my infection area under my arm. After you review my blood work results, please confirm that my ultrasound on my abdomen is still needed. "  "

## 2025-03-27 ENCOUNTER — RESULTS FOLLOW-UP (OUTPATIENT)
Dept: INTERNAL MEDICINE | Facility: CLINIC | Age: 70
End: 2025-03-27

## 2025-03-27 ENCOUNTER — HOSPITAL ENCOUNTER (OUTPATIENT)
Dept: RADIOLOGY | Facility: OTHER | Age: 70
Discharge: HOME | End: 2025-03-27
Attending: INTERNAL MEDICINE
Payer: MEDICARE

## 2025-03-27 DIAGNOSIS — R74.8 ELEVATED ALKALINE PHOSPHATASE LEVEL: ICD-10-CM

## 2025-03-27 PROCEDURE — 76705 ECHO EXAM OF ABDOMEN: CPT | Mod: 26,HCNC,, | Performed by: RADIOLOGY

## 2025-03-27 PROCEDURE — 76705 ECHO EXAM OF ABDOMEN: CPT | Mod: TC,HCNC

## 2025-05-13 ENCOUNTER — PATIENT MESSAGE (OUTPATIENT)
Dept: SURGERY | Facility: CLINIC | Age: 70
End: 2025-05-13

## 2025-06-19 ENCOUNTER — PATIENT MESSAGE (OUTPATIENT)
Dept: DERMATOLOGY | Facility: CLINIC | Age: 70
End: 2025-06-19
Payer: MEDICARE

## 2025-06-19 ENCOUNTER — TELEPHONE (OUTPATIENT)
Dept: DERMATOLOGY | Facility: CLINIC | Age: 70
End: 2025-06-19
Payer: MEDICARE

## 2025-06-19 NOTE — TELEPHONE ENCOUNTER
"Copied from CRM #[id number]. Topic: General Inquiry - Patient Advice  >> Jun 19, 2025 11:04 AM Densise wrote:  .Name Of Caller: Self     Contact Preference?:[phone]     What is the nature of the call? Pt calling to confirm appt that was texted and offered pt willing to accept 6/19/25 2:15 pm, nain winkler       Additional Notes:  "Thank you for all that you do for our patients"    Pt did not take appointment when he got text so appointment is still on wait list   "

## 2025-08-18 ENCOUNTER — PATIENT MESSAGE (OUTPATIENT)
Dept: INTERNAL MEDICINE | Facility: CLINIC | Age: 70
End: 2025-08-18
Payer: MEDICARE

## 2025-08-18 RX ORDER — ROSUVASTATIN CALCIUM 5 MG/1
5 TABLET, COATED ORAL DAILY
Qty: 90 TABLET | Refills: 1 | Status: SHIPPED | OUTPATIENT
Start: 2025-08-18

## 2025-09-02 ENCOUNTER — OFFICE VISIT (OUTPATIENT)
Dept: DERMATOLOGY | Facility: CLINIC | Age: 70
End: 2025-09-02
Payer: MEDICARE

## 2025-09-02 DIAGNOSIS — L82.1 SEBORRHEIC KERATOSIS: ICD-10-CM

## 2025-09-02 DIAGNOSIS — D22.9 MULTIPLE BENIGN NEVI: ICD-10-CM

## 2025-09-02 DIAGNOSIS — L81.4 LENTIGINES: ICD-10-CM

## 2025-09-02 DIAGNOSIS — Z12.83 SCREENING EXAM FOR SKIN CANCER: ICD-10-CM

## 2025-09-02 DIAGNOSIS — Z85.828 HISTORY OF NONMELANOMA SKIN CANCER: ICD-10-CM

## 2025-09-02 DIAGNOSIS — L57.0 ACTINIC KERATOSIS: Primary | ICD-10-CM

## 2025-09-02 PROCEDURE — 1159F MED LIST DOCD IN RCRD: CPT | Mod: CPTII,S$GLB,, | Performed by: DERMATOLOGY

## 2025-09-02 PROCEDURE — 17003 DESTRUCT PREMALG LES 2-14: CPT | Mod: S$GLB,,, | Performed by: DERMATOLOGY

## 2025-09-02 PROCEDURE — 1126F AMNT PAIN NOTED NONE PRSNT: CPT | Mod: CPTII,S$GLB,, | Performed by: DERMATOLOGY

## 2025-09-02 PROCEDURE — 17000 DESTRUCT PREMALG LESION: CPT | Mod: S$GLB,,, | Performed by: DERMATOLOGY

## 2025-09-02 PROCEDURE — 1101F PT FALLS ASSESS-DOCD LE1/YR: CPT | Mod: CPTII,S$GLB,, | Performed by: DERMATOLOGY

## 2025-09-02 PROCEDURE — 99213 OFFICE O/P EST LOW 20 MIN: CPT | Mod: 25,S$GLB,, | Performed by: DERMATOLOGY

## 2025-09-02 PROCEDURE — 3288F FALL RISK ASSESSMENT DOCD: CPT | Mod: CPTII,S$GLB,, | Performed by: DERMATOLOGY

## 2025-09-02 PROCEDURE — 1160F RVW MEDS BY RX/DR IN RCRD: CPT | Mod: CPTII,S$GLB,, | Performed by: DERMATOLOGY

## (undated) DEVICE — NDL HYPO STD REG BVL 30G 0.5IN

## (undated) DEVICE — SOL POVIDONE SCRUB IODINE 4 OZ

## (undated) DEVICE — STRIP MEDI WND CLSR 1/2X4IN

## (undated) DEVICE — SOL BETADINE 5%

## (undated) DEVICE — PACK AUTO GAS FILL

## (undated) DEVICE — DRAPE THREE-QTR REINF 53X77IN

## (undated) DEVICE — SYR DISP LL 5CC

## (undated) DEVICE — GLOVE BIOGEL ECLIPSE SZ 6.5

## (undated) DEVICE — DRESSING EYE OVAL LF

## (undated) DEVICE — PACK TOTAL PLUS 25G VITRECTOMY

## (undated) DEVICE — TRAY MUSCLE LID EYE

## (undated) DEVICE — KIT PERFLUOROCARBON LIQUID

## (undated) DEVICE — GOWN SURGICAL X-LARGE

## (undated) DEVICE — KNIFE OPHTH MICRO UNITOME 5MM

## (undated) DEVICE — CONTAINER SPECIMEN STRL 4OZ

## (undated) DEVICE — GLOVE SENSICARE PI SURG 7.5

## (undated) DEVICE — SOL BSS BALANCED SALT

## (undated) DEVICE — NDL HYPO A BEVEL 30X1/2

## (undated) DEVICE — Device

## (undated) DEVICE — FORCEP GRASPING 25GA SMOOTH

## (undated) DEVICE — SYR 1CC TB SG 27GX1/2

## (undated) DEVICE — HOLDER TUBE

## (undated) DEVICE — CORD BPLR SGL USE DISP STRL

## (undated) DEVICE — PENCIL BIPOLAR 25G STR TIP

## (undated) DEVICE — NEEDLE HYPODERMIC HUB LUER LOC

## (undated) DEVICE — SOL BALANCED SALT 500ML

## (undated) DEVICE — SOL WATER STRL IRR 1000ML

## (undated) DEVICE — FORCEP GRIESHABER MAXGRIP 25G

## (undated) DEVICE — PROBE ILLUM FLEX CURVE LASER

## (undated) DEVICE — SUT 7/0 18IN COATED VICRYL

## (undated) DEVICE — PACK INSTRUMENT COVER DISPO

## (undated) DEVICE — KIT GREY EYE

## (undated) DEVICE — CORD FOR BIPOLAR FORCEPS 12

## (undated) DEVICE — CANNULA OPTHALMIC SOF TIP 25G

## (undated) DEVICE — COVER MAYO STAND REINFRCD 30

## (undated) DEVICE — SYR 10CC LUER LOCK

## (undated) DEVICE — SOL GONAK

## (undated) DEVICE — LENS VITRCTMY OPHTH 30DEG 59DE

## (undated) DEVICE — NDL 22GA X1 1/2 REG BEVEL

## (undated) DEVICE — SYRINGE 30CC LL W/O NDL

## (undated) DEVICE — COVER PROXIMA MAYO STAND

## (undated) DEVICE — BACKFLUSH 25GA SOFT-TIP DISP